# Patient Record
Sex: FEMALE | Race: WHITE | Employment: OTHER | URBAN - METROPOLITAN AREA
[De-identification: names, ages, dates, MRNs, and addresses within clinical notes are randomized per-mention and may not be internally consistent; named-entity substitution may affect disease eponyms.]

---

## 2021-08-16 ENCOUNTER — HOSPITAL ENCOUNTER (INPATIENT)
Age: 61
LOS: 5 days | Discharge: HOME | DRG: 872 | End: 2021-08-21
Attending: EMERGENCY MEDICINE | Admitting: INTERNAL MEDICINE
Payer: MEDICARE

## 2021-08-16 ENCOUNTER — APPOINTMENT (OUTPATIENT)
Dept: GENERAL RADIOLOGY | Age: 61
DRG: 872 | End: 2021-08-16
Attending: EMERGENCY MEDICINE
Payer: MEDICARE

## 2021-08-16 ENCOUNTER — APPOINTMENT (OUTPATIENT)
Dept: CT IMAGING | Age: 61
DRG: 872 | End: 2021-08-16
Attending: EMERGENCY MEDICINE
Payer: MEDICARE

## 2021-08-16 DIAGNOSIS — N10 ACUTE PYELONEPHRITIS: Primary | ICD-10-CM

## 2021-08-16 DIAGNOSIS — G62.9 PERIPHERAL POLYNEUROPATHY: ICD-10-CM

## 2021-08-16 PROBLEM — E11.9 DIABETES TYPE 2, NO OCULAR INVOLVEMENT (HCC): Status: ACTIVE | Noted: 2021-05-04

## 2021-08-16 PROBLEM — M54.41 CHRONIC BILATERAL LOW BACK PAIN WITH RIGHT-SIDED SCIATICA: Status: ACTIVE | Noted: 2019-01-18

## 2021-08-16 PROBLEM — M25.551 RIGHT HIP PAIN: Status: ACTIVE | Noted: 2019-01-18

## 2021-08-16 PROBLEM — E87.1 HYPONATREMIA: Status: ACTIVE | Noted: 2021-08-16

## 2021-08-16 PROBLEM — G89.29 CHRONIC BILATERAL LOW BACK PAIN WITH RIGHT-SIDED SCIATICA: Status: ACTIVE | Noted: 2019-01-18

## 2021-08-16 PROBLEM — A41.9 SEPSIS (HCC): Status: ACTIVE | Noted: 2021-08-16

## 2021-08-16 PROBLEM — E87.6 HYPOKALEMIA: Status: ACTIVE | Noted: 2021-08-16

## 2021-08-16 PROBLEM — N12 PYELONEPHRITIS: Status: ACTIVE | Noted: 2021-08-16

## 2021-08-16 PROBLEM — D72.829 LEUKOCYTOSIS: Status: ACTIVE | Noted: 2021-08-16

## 2021-08-16 LAB
ALBUMIN SERPL-MCNC: 3.2 G/DL (ref 3.4–5)
ALBUMIN/GLOB SERPL: 0.8 {RATIO} (ref 0.8–1.7)
ALP SERPL-CCNC: 92 U/L (ref 45–117)
ALT SERPL-CCNC: 16 U/L (ref 13–56)
AMPHET UR QL SCN: NEGATIVE
ANION GAP SERPL CALC-SCNC: 11 MMOL/L (ref 3–18)
APPEARANCE UR: CLEAR
AST SERPL-CCNC: 14 U/L (ref 10–38)
ATRIAL RATE: 105 BPM
BACTERIA URNS QL MICRO: ABNORMAL /HPF
BARBITURATES UR QL SCN: NEGATIVE
BASOPHILS # BLD: 0 K/UL (ref 0–0.1)
BASOPHILS NFR BLD: 0 % (ref 0–2)
BENZODIAZ UR QL: NEGATIVE
BILIRUB SERPL-MCNC: 0.7 MG/DL (ref 0.2–1)
BILIRUB UR QL: NEGATIVE
BUN SERPL-MCNC: 13 MG/DL (ref 7–18)
BUN/CREAT SERPL: 18 (ref 12–20)
CALCIUM SERPL-MCNC: 8.7 MG/DL (ref 8.5–10.1)
CALCULATED P AXIS, ECG09: 44 DEGREES
CALCULATED R AXIS, ECG10: 27 DEGREES
CALCULATED T AXIS, ECG11: 54 DEGREES
CANNABINOIDS UR QL SCN: POSITIVE
CHLORIDE SERPL-SCNC: 99 MMOL/L (ref 100–111)
CO2 SERPL-SCNC: 24 MMOL/L (ref 21–32)
COCAINE UR QL SCN: NEGATIVE
COLOR UR: YELLOW
COVID-19 RAPID TEST, COVR: NOT DETECTED
CREAT SERPL-MCNC: 0.72 MG/DL (ref 0.6–1.3)
DIAGNOSIS, 93000: NORMAL
DIFFERENTIAL METHOD BLD: ABNORMAL
EOSINOPHIL # BLD: 0 K/UL (ref 0–0.4)
EOSINOPHIL NFR BLD: 0 % (ref 0–5)
EPITH CASTS URNS QL MICRO: ABNORMAL /LPF (ref 0–5)
ERYTHROCYTE [DISTWIDTH] IN BLOOD BY AUTOMATED COUNT: 15 % (ref 11.6–14.5)
EST. AVERAGE GLUCOSE BLD GHB EST-MCNC: 114 MG/DL
FLUAV AG NPH QL IA: NEGATIVE
FLUBV AG NOSE QL IA: NEGATIVE
GLOBULIN SER CALC-MCNC: 3.8 G/DL (ref 2–4)
GLUCOSE BLD STRIP.AUTO-MCNC: 167 MG/DL (ref 70–110)
GLUCOSE BLD STRIP.AUTO-MCNC: 176 MG/DL (ref 70–110)
GLUCOSE SERPL-MCNC: 192 MG/DL (ref 74–99)
GLUCOSE UR STRIP.AUTO-MCNC: NEGATIVE MG/DL
HBA1C MFR BLD: 5.6 % (ref 4.2–5.6)
HCT VFR BLD AUTO: 36.3 % (ref 35–45)
HDSCOM,HDSCOM: ABNORMAL
HGB BLD-MCNC: 12.2 G/DL (ref 12–16)
HGB UR QL STRIP: ABNORMAL
KETONES UR QL STRIP.AUTO: 15 MG/DL
LACTATE BLD-SCNC: 1.73 MMOL/L (ref 0.4–2)
LACTATE BLD-SCNC: 2.36 MMOL/L (ref 0.4–2)
LACTATE BLD-SCNC: 3.05 MMOL/L (ref 0.4–2)
LEUKOCYTE ESTERASE UR QL STRIP.AUTO: ABNORMAL
LYMPHOCYTES # BLD: 1 K/UL (ref 0.9–3.6)
LYMPHOCYTES NFR BLD: 5 % (ref 21–52)
MAGNESIUM SERPL-MCNC: 1.5 MG/DL (ref 1.6–2.6)
MCH RBC QN AUTO: 30.6 PG (ref 24–34)
MCHC RBC AUTO-ENTMCNC: 33.6 G/DL (ref 31–37)
MCV RBC AUTO: 91 FL (ref 74–97)
METHADONE UR QL: NEGATIVE
MONOCYTES # BLD: 1 K/UL (ref 0.05–1.2)
MONOCYTES NFR BLD: 5 % (ref 3–10)
NEUTS SEG # BLD: 17.1 K/UL (ref 1.8–8)
NEUTS SEG NFR BLD: 90 % (ref 40–73)
NITRITE UR QL STRIP.AUTO: POSITIVE
OPIATES UR QL: POSITIVE
P-R INTERVAL, ECG05: 134 MS
PCP UR QL: NEGATIVE
PH UR STRIP: 6.5 [PH] (ref 5–8)
PLATELET # BLD AUTO: 144 K/UL (ref 135–420)
PLATELET COMMENTS,PCOM: ABNORMAL
PMV BLD AUTO: 13.3 FL (ref 9.2–11.8)
POTASSIUM SERPL-SCNC: 3.2 MMOL/L (ref 3.5–5.5)
PROT SERPL-MCNC: 7 G/DL (ref 6.4–8.2)
PROT UR STRIP-MCNC: 300 MG/DL
Q-T INTERVAL, ECG07: 352 MS
QRS DURATION, ECG06: 84 MS
QTC CALCULATION (BEZET), ECG08: 465 MS
RBC # BLD AUTO: 3.99 M/UL (ref 4.2–5.3)
RBC #/AREA URNS HPF: ABNORMAL /HPF (ref 0–5)
RBC MORPH BLD: ABNORMAL
SODIUM SERPL-SCNC: 134 MMOL/L (ref 136–145)
SOURCE, COVRS: NORMAL
SP GR UR REFRACTOMETRY: 1.02 (ref 1–1.03)
UROBILINOGEN UR QL STRIP.AUTO: 0.2 EU/DL (ref 0.2–1)
VENTRICULAR RATE, ECG03: 105 BPM
WBC # BLD AUTO: 19.1 K/UL (ref 4.6–13.2)
WBC URNS QL MICRO: ABNORMAL /HPF (ref 0–5)

## 2021-08-16 PROCEDURE — 65270000029 HC RM PRIVATE

## 2021-08-16 PROCEDURE — 82962 GLUCOSE BLOOD TEST: CPT

## 2021-08-16 PROCEDURE — 74011000636 HC RX REV CODE- 636: Performed by: EMERGENCY MEDICINE

## 2021-08-16 PROCEDURE — 81001 URINALYSIS AUTO W/SCOPE: CPT

## 2021-08-16 PROCEDURE — 87635 SARS-COV-2 COVID-19 AMP PRB: CPT

## 2021-08-16 PROCEDURE — 71045 X-RAY EXAM CHEST 1 VIEW: CPT

## 2021-08-16 PROCEDURE — 74011636637 HC RX REV CODE- 636/637: Performed by: INTERNAL MEDICINE

## 2021-08-16 PROCEDURE — 83735 ASSAY OF MAGNESIUM: CPT

## 2021-08-16 PROCEDURE — 96375 TX/PRO/DX INJ NEW DRUG ADDON: CPT

## 2021-08-16 PROCEDURE — 87086 URINE CULTURE/COLONY COUNT: CPT

## 2021-08-16 PROCEDURE — 74177 CT ABD & PELVIS W/CONTRAST: CPT

## 2021-08-16 PROCEDURE — 87449 NOS EACH ORGANISM AG IA: CPT

## 2021-08-16 PROCEDURE — 74011250637 HC RX REV CODE- 250/637: Performed by: EMERGENCY MEDICINE

## 2021-08-16 PROCEDURE — 87804 INFLUENZA ASSAY W/OPTIC: CPT

## 2021-08-16 PROCEDURE — 74011250636 HC RX REV CODE- 250/636: Performed by: EMERGENCY MEDICINE

## 2021-08-16 PROCEDURE — 96365 THER/PROPH/DIAG IV INF INIT: CPT

## 2021-08-16 PROCEDURE — 83605 ASSAY OF LACTIC ACID: CPT

## 2021-08-16 PROCEDURE — 96366 THER/PROPH/DIAG IV INF ADDON: CPT

## 2021-08-16 PROCEDURE — 87040 BLOOD CULTURE FOR BACTERIA: CPT

## 2021-08-16 PROCEDURE — 74011250637 HC RX REV CODE- 250/637: Performed by: INTERNAL MEDICINE

## 2021-08-16 PROCEDURE — 83036 HEMOGLOBIN GLYCOSYLATED A1C: CPT

## 2021-08-16 PROCEDURE — 80053 COMPREHEN METABOLIC PANEL: CPT

## 2021-08-16 PROCEDURE — 93005 ELECTROCARDIOGRAM TRACING: CPT

## 2021-08-16 PROCEDURE — 99285 EMERGENCY DEPT VISIT HI MDM: CPT

## 2021-08-16 PROCEDURE — 80307 DRUG TEST PRSMV CHEM ANLYZR: CPT

## 2021-08-16 PROCEDURE — 74011250636 HC RX REV CODE- 250/636: Performed by: INTERNAL MEDICINE

## 2021-08-16 PROCEDURE — 74011000250 HC RX REV CODE- 250: Performed by: EMERGENCY MEDICINE

## 2021-08-16 PROCEDURE — 85025 COMPLETE CBC W/AUTO DIFF WBC: CPT

## 2021-08-16 RX ORDER — ALBUTEROL SULFATE 90 UG/1
2 AEROSOL, METERED RESPIRATORY (INHALATION)
Status: DISCONTINUED | OUTPATIENT
Start: 2021-08-16 | End: 2021-08-21 | Stop reason: HOSPADM

## 2021-08-16 RX ORDER — INSULIN LISPRO 100 [IU]/ML
INJECTION, SOLUTION INTRAVENOUS; SUBCUTANEOUS
Status: DISCONTINUED | OUTPATIENT
Start: 2021-08-16 | End: 2021-08-21 | Stop reason: HOSPADM

## 2021-08-16 RX ORDER — LEVOFLOXACIN 5 MG/ML
750 INJECTION, SOLUTION INTRAVENOUS EVERY 24 HOURS
Status: DISCONTINUED | OUTPATIENT
Start: 2021-08-16 | End: 2021-08-21 | Stop reason: ALTCHOICE

## 2021-08-16 RX ORDER — ACETAMINOPHEN 325 MG/1
650 TABLET ORAL
Status: DISCONTINUED | OUTPATIENT
Start: 2021-08-16 | End: 2021-08-21 | Stop reason: HOSPADM

## 2021-08-16 RX ORDER — SODIUM CHLORIDE 9 MG/ML
150 INJECTION, SOLUTION INTRAVENOUS CONTINUOUS
Status: DISPENSED | OUTPATIENT
Start: 2021-08-16 | End: 2021-08-17

## 2021-08-16 RX ORDER — ONDANSETRON 4 MG/1
4 TABLET, ORALLY DISINTEGRATING ORAL
Status: DISCONTINUED | OUTPATIENT
Start: 2021-08-16 | End: 2021-08-21 | Stop reason: HOSPADM

## 2021-08-16 RX ORDER — POTASSIUM CHLORIDE 20 MEQ/1
40 TABLET, EXTENDED RELEASE ORAL EVERY 4 HOURS
Status: COMPLETED | OUTPATIENT
Start: 2021-08-16 | End: 2021-08-16

## 2021-08-16 RX ORDER — ONDANSETRON 2 MG/ML
4 INJECTION INTRAMUSCULAR; INTRAVENOUS
Status: DISCONTINUED | OUTPATIENT
Start: 2021-08-16 | End: 2021-08-21 | Stop reason: HOSPADM

## 2021-08-16 RX ORDER — ONDANSETRON 2 MG/ML
4 INJECTION INTRAMUSCULAR; INTRAVENOUS
Status: COMPLETED | OUTPATIENT
Start: 2021-08-16 | End: 2021-08-16

## 2021-08-16 RX ORDER — MAGNESIUM SULFATE 100 %
4 CRYSTALS MISCELLANEOUS AS NEEDED
Status: DISCONTINUED | OUTPATIENT
Start: 2021-08-16 | End: 2021-08-21 | Stop reason: HOSPADM

## 2021-08-16 RX ORDER — OXYCODONE HYDROCHLORIDE 5 MG/1
5 TABLET ORAL
Status: DISCONTINUED | OUTPATIENT
Start: 2021-08-16 | End: 2021-08-21 | Stop reason: HOSPADM

## 2021-08-16 RX ORDER — SODIUM CHLORIDE 0.9 % (FLUSH) 0.9 %
5-40 SYRINGE (ML) INJECTION EVERY 8 HOURS
Status: DISCONTINUED | OUTPATIENT
Start: 2021-08-16 | End: 2021-08-19

## 2021-08-16 RX ORDER — PANTOPRAZOLE SODIUM 40 MG/10ML
40 INJECTION, POWDER, LYOPHILIZED, FOR SOLUTION INTRAVENOUS DAILY
Status: DISCONTINUED | OUTPATIENT
Start: 2021-08-17 | End: 2021-08-19

## 2021-08-16 RX ORDER — POLYETHYLENE GLYCOL 3350 17 G/17G
17 POWDER, FOR SOLUTION ORAL DAILY PRN
Status: DISCONTINUED | OUTPATIENT
Start: 2021-08-16 | End: 2021-08-21 | Stop reason: HOSPADM

## 2021-08-16 RX ORDER — ACETAMINOPHEN 500 MG
1000 TABLET ORAL
Status: COMPLETED | OUTPATIENT
Start: 2021-08-16 | End: 2021-08-16

## 2021-08-16 RX ORDER — SODIUM CHLORIDE, SODIUM LACTATE, POTASSIUM CHLORIDE, CALCIUM CHLORIDE 600; 310; 30; 20 MG/100ML; MG/100ML; MG/100ML; MG/100ML
250 INJECTION, SOLUTION INTRAVENOUS CONTINUOUS
Status: DISCONTINUED | OUTPATIENT
Start: 2021-08-16 | End: 2021-08-16

## 2021-08-16 RX ORDER — MAGNESIUM SULFATE HEPTAHYDRATE 40 MG/ML
2 INJECTION, SOLUTION INTRAVENOUS ONCE
Status: DISCONTINUED | OUTPATIENT
Start: 2021-08-16 | End: 2021-08-16

## 2021-08-16 RX ORDER — DEXTROSE 50 % IN WATER (D50W) INTRAVENOUS SYRINGE
25-50 AS NEEDED
Status: DISCONTINUED | OUTPATIENT
Start: 2021-08-16 | End: 2021-08-21 | Stop reason: HOSPADM

## 2021-08-16 RX ORDER — SODIUM CHLORIDE 0.9 % (FLUSH) 0.9 %
5-40 SYRINGE (ML) INJECTION AS NEEDED
Status: DISCONTINUED | OUTPATIENT
Start: 2021-08-16 | End: 2021-08-19

## 2021-08-16 RX ORDER — MAGNESIUM SULFATE HEPTAHYDRATE 40 MG/ML
2 INJECTION, SOLUTION INTRAVENOUS ONCE
Status: COMPLETED | OUTPATIENT
Start: 2021-08-16 | End: 2021-08-16

## 2021-08-16 RX ORDER — ENOXAPARIN SODIUM 100 MG/ML
40 INJECTION SUBCUTANEOUS DAILY
Status: DISCONTINUED | OUTPATIENT
Start: 2021-08-17 | End: 2021-08-18

## 2021-08-16 RX ORDER — ACETAMINOPHEN 650 MG/1
650 SUPPOSITORY RECTAL
Status: DISCONTINUED | OUTPATIENT
Start: 2021-08-16 | End: 2021-08-21 | Stop reason: HOSPADM

## 2021-08-16 RX ORDER — SODIUM CHLORIDE 0.9 % (FLUSH) 0.9 %
5-10 SYRINGE (ML) INJECTION AS NEEDED
Status: DISCONTINUED | OUTPATIENT
Start: 2021-08-16 | End: 2021-08-21 | Stop reason: HOSPADM

## 2021-08-16 RX ADMIN — SODIUM CHLORIDE 150 ML/HR: 900 INJECTION, SOLUTION INTRAVENOUS at 16:09

## 2021-08-16 RX ADMIN — ONDANSETRON 4 MG: 4 TABLET, ORALLY DISINTEGRATING ORAL at 22:12

## 2021-08-16 RX ADMIN — Medication 10 ML: at 15:12

## 2021-08-16 RX ADMIN — SODIUM CHLORIDE 448 ML: 900 INJECTION, SOLUTION INTRAVENOUS at 12:32

## 2021-08-16 RX ADMIN — OXYCODONE 5 MG: 5 TABLET ORAL at 18:08

## 2021-08-16 RX ADMIN — IOPAMIDOL 100 ML: 612 INJECTION, SOLUTION INTRAVENOUS at 12:29

## 2021-08-16 RX ADMIN — LEVOFLOXACIN 750 MG: 5 INJECTION, SOLUTION INTRAVENOUS at 11:09

## 2021-08-16 RX ADMIN — SODIUM CHLORIDE 1000 ML: 900 INJECTION, SOLUTION INTRAVENOUS at 10:53

## 2021-08-16 RX ADMIN — CEFEPIME HYDROCHLORIDE 2 G: 2 INJECTION, POWDER, FOR SOLUTION INTRAVENOUS at 11:09

## 2021-08-16 RX ADMIN — MAGNESIUM SULFATE 2 G: 2 INJECTION INTRAVENOUS at 18:00

## 2021-08-16 RX ADMIN — POTASSIUM CHLORIDE 40 MEQ: 20 TABLET, EXTENDED RELEASE ORAL at 15:13

## 2021-08-16 RX ADMIN — OXYCODONE 5 MG: 5 TABLET ORAL at 22:20

## 2021-08-16 RX ADMIN — ONDANSETRON 4 MG: 2 INJECTION INTRAMUSCULAR; INTRAVENOUS at 15:12

## 2021-08-16 RX ADMIN — ACETAMINOPHEN 1000 MG: 500 TABLET ORAL at 11:09

## 2021-08-16 RX ADMIN — SODIUM CHLORIDE, SODIUM LACTATE, POTASSIUM CHLORIDE, AND CALCIUM CHLORIDE 250 ML/HR: 600; 310; 30; 20 INJECTION, SOLUTION INTRAVENOUS at 13:22

## 2021-08-16 RX ADMIN — INSULIN LISPRO 2 UNITS: 100 INJECTION, SOLUTION INTRAVENOUS; SUBCUTANEOUS at 18:00

## 2021-08-16 RX ADMIN — CEFEPIME HYDROCHLORIDE 2 G: 2 INJECTION, POWDER, FOR SOLUTION INTRAVENOUS at 22:12

## 2021-08-16 RX ADMIN — SODIUM CHLORIDE 1000 ML: 900 INJECTION, SOLUTION INTRAVENOUS at 11:12

## 2021-08-16 RX ADMIN — ONDANSETRON 4 MG: 2 INJECTION INTRAMUSCULAR; INTRAVENOUS at 11:09

## 2021-08-16 RX ADMIN — POTASSIUM CHLORIDE 40 MEQ: 20 TABLET, EXTENDED RELEASE ORAL at 18:08

## 2021-08-16 NOTE — ROUTINE PROCESS
Bedside shift change report given to Jared Will [place] (oncoming nurse) by Antonina Yuen RN (offgoing nurse). Report included the following information SBAR, Kardex, Intake/Output, MAR and Recent Results.

## 2021-08-16 NOTE — ED PROVIDER NOTES
EMERGENCY DEPARTMENT HISTORY AND PHYSICAL EXAM    10:49 AM    Date: 8/16/2021  Patient Name: Hattie Arriaga    History of Presenting Illness     Chief Complaint   Patient presents with    Abdominal Pain     reports last week of fevers, vomiting. felt like a UTI to her    Vomiting    Fever       History Provided By: Patient  Location/Duration/Severity/Modifying factors   Patient is a 57-year-old female with a past medical history pretension presenting to the emergency department with a chief complaint of loss of appetite, vomiting, malaise going on for the past 2 to 3 days. Reports that it feels similar to when she had urinary tract infections in the past.  She complains of generalized pain, myalgias and bilateral flank pain. Also with some abdominal discomfort reports that she feels \"bloated\". She denies any chest pain or shortness of breath. Moderate in severity. Far as her symptoms ago, the pain in her back is in both flanks, no numbness tingling weakness in her legs no urinary retention. Not noted any dysuria urgency or frequency. Denies any history of kidney stones.           PCP: Lasha Mora DO    Current Facility-Administered Medications   Medication Dose Route Frequency Provider Last Rate Last Admin    sodium chloride (NS) flush 5-10 mL  5-10 mL IntraVENous PRN Shane Harris DO        cefepime (MAXIPIME) 2 g in sterile water (preservative free) 10 mL IV syringe  2 g IntraVENous Q8H Adriana CORONA, DO   IV Completed at 08/16/21 1116    levoFLOXacin (LEVAQUIN) 750 mg in D5W IVPB  750 mg IntraVENous Q24H Shane Harris DO   IV Completed at 08/16/21 1257    albuterol (PROVENTIL HFA, VENTOLIN HFA, PROAIR HFA) inhaler 2 Puff  2 Puff Inhalation Q4H PRN Jose Martin Bowie MD        sodium chloride (NS) flush 5-40 mL  5-40 mL IntraVENous Q8H Jose Martin Bowie MD   10 mL at 08/16/21 1512    sodium chloride (NS) flush 5-40 mL  5-40 mL IntraVENous PRN Jose Martin Bowie MD       [facility] acetaminophen (TYLENOL) tablet 650 mg  650 mg Oral Q6H PRN Genny Bowie MD        Or    acetaminophen (TYLENOL) suppository 650 mg  650 mg Rectal Q6H PRN Genny Bowie MD        polyethylene glycol (MIRALAX) packet 17 g  17 g Oral DAILY PRN Genny Bowie MD        ondansetron (ZOFRAN ODT) tablet 4 mg  4 mg Oral Q8H PRN Genny Bowie MD        Or    [facility]ron [facility]) injection 4 mg  4 mg IntraVENous Q6H PRN Genny Bowie MD   4 mg at 08/16/21 1512    [START ON 8/17/2021] enoxaparin (LOVENOX) injection 40 mg  40 mg SubCUTAneous DAILY Genny Bowie MD        [START ON 8/17/2021] pantoprazole (PROTONIX) injection 40 mg  40 mg IntraVENous DAILY Genny Bowie MD        0.9% sodium chloride infusion  150 mL/hr IntraVENous CONTINUOUS Genny Bowie  mL/hr at 08/16/21 1609 150 mL/hr at 08/16/21 1609    insulin lispro (HUMALOG) injection   SubCUTAneous AC&HS Genny Bowie MD   2 Units at 08/16/21 1800    glucose chewable tablet 16 g  4 Tablet Oral PRN Genny Bowie MD        glucagon (GLUCAGEN) injection 1 mg  1 mg IntraMUSCular PRN Genny Bowie MD        dextrose (D50W) injection syrg 12.5-25 g  25-50 mL IntraVENous PRN Genny Bowie MD        oxyCODONE IR (ROXICODONE) tablet 5 mg  5 mg Oral Q4H PRN Genny Bowie MD   5 mg at 08/16/21 1808       Past History     Past Medical History:  Past Medical History:   Diagnosis Date    Back pain     Fibromyalgia     Neuropathy        Past Surgical History:  No past surgical history on file. Family History:  No family history on file. Social History:  Social History     Tobacco Use    Smoking status: Not on file   Substance Use Topics    Alcohol use: Not on file    Drug use: Not on file       Allergies:   Allergies   Allergen Reactions    Sulfa (Sulfonamide Antibiotics) Hives       I reviewed and confirmed the above information with patient and updated as necessary. Review of Systems     Review of Systems   Constitutional: Positive for appetite change, chills, diaphoresis, fatigue and fever. HENT: Negative for congestion, rhinorrhea, sinus pressure and sneezing. Eyes: Negative for visual disturbance. Respiratory: Negative for cough, shortness of breath and wheezing. Cardiovascular: Negative for chest pain and leg swelling. Gastrointestinal: Negative for abdominal pain, diarrhea, nausea and vomiting. Genitourinary: Positive for flank pain. Negative for dysuria, frequency and urgency. Musculoskeletal: Negative for back pain and neck pain. Skin: Negative for rash. Neurological: Negative for syncope, numbness and headaches. Physical Exam     Visit Vitals  /61   Pulse 95   Temp 97.9 °F (36.6 °C)   Resp 16   Ht 5' 4\" (1.626 m)   Wt 81.6 kg (180 lb)   SpO2 95%   Breastfeeding No   BMI 30.90 kg/m²       Physical Exam  Vitals and nursing note reviewed. Constitutional:       General: She is not in acute distress. Appearance: Normal appearance. She is normal weight. She is not ill-appearing or toxic-appearing. HENT:      Head: Normocephalic and atraumatic. Right Ear: External ear normal.      Left Ear: External ear normal.      Nose: Nose normal.      Mouth/Throat:      Mouth: Mucous membranes are moist.   Eyes:      Extraocular Movements: Extraocular movements intact. Conjunctiva/sclera: Conjunctivae normal.      Pupils: Pupils are equal, round, and reactive to light. Cardiovascular:      Rate and Rhythm: Normal rate and regular rhythm. Pulses: Normal pulses. Heart sounds: Normal heart sounds. No murmur heard. Pulmonary:      Effort: Pulmonary effort is normal.      Breath sounds: Normal breath sounds. No wheezing, rhonchi or rales. Abdominal:      General: Abdomen is flat. Bowel sounds are normal.      Palpations: Abdomen is soft. Tenderness:  There is generalized abdominal tenderness. There is no guarding or rebound. Negative signs include Bautista's sign and McBurney's sign. Comments: Generalized abdominal fullness. Musculoskeletal:         General: No swelling or tenderness. Normal range of motion. Cervical back: Normal range of motion and neck supple. Right lower leg: No edema. Left lower leg: No edema. Skin:     General: Skin is warm and dry. Capillary Refill: Capillary refill takes less than 2 seconds. Findings: No rash. Neurological:      General: No focal deficit present. Mental Status: She is alert. She is disoriented. Diagnostic Study Results     Labs -  Recent Results (from the past 24 hour(s))   EKG, 12 LEAD, INITIAL    Collection Time: 08/16/21 10:40 AM   Result Value Ref Range    Ventricular Rate 105 BPM    Atrial Rate 105 BPM    P-R Interval 134 ms    QRS Duration 84 ms    Q-T Interval 352 ms    QTC Calculation (Bezet) 465 ms    Calculated P Axis 44 degrees    Calculated R Axis 27 degrees    Calculated T Axis 54 degrees    Diagnosis       Sinus tachycardia  Nonspecific ST abnormality  Abnormal ECG  No previous ECGs available  Confirmed by Ursula Buckner MD. ([id number]) on 8/16/2021 6:98:87 PM     METABOLIC PANEL, COMPREHENSIVE    Collection Time: 08/16/21 10:45 AM   Result Value Ref Range    Sodium 134 (L) 136 - 145 mmol/L    Potassium 3.2 (L) 3.5 - 5.5 mmol/L    Chloride 99 (L) 100 - 111 mmol/L    CO2 24 21 - 32 mmol/L    Anion gap 11 3.0 - 18 mmol/L    Glucose 192 (H) 74 - 99 mg/dL    BUN 13 7.0 - 18 MG/DL    Creatinine 0.72 0.6 - 1.3 MG/DL    BUN/Creatinine ratio 18 12 - 20      GFR est AA >60 >60 ml/min/1.73m2    GFR est non-AA >60 >60 ml/min/1.73m2    Calcium 8.7 8.5 - 10.1 MG/DL    Bilirubin, total 0.7 0.2 - 1.0 MG/DL    ALT (SGPT) 16 13 - 56 U/L    AST (SGOT) 14 10 - 38 U/L    Alk.  phosphatase 92 45 - 117 U/L    Protein, total 7.0 6.4 - 8.2 g/dL    Albumin 3.2 (L) 3.4 - 5.0 g/dL    Globulin 3.8 2.0 - 4.0 g/dL    A-G Ratio 0.8 0.8 - 1.7     CBC WITH AUTOMATED DIFF    Collection Time: 08/16/21 10:45 AM   Result Value Ref Range    WBC 19.1 (H) 4.6 - 13.2 K/uL    RBC 3.99 (L) 4.20 - 5.30 M/uL    HGB 12.2 12.0 - 16.0 g/dL    HCT 36.3 35.0 - 45.0 %    MCV 91.0 74.0 - 97.0 FL    MCH 30.6 24.0 - 34.0 PG    MCHC 33.6 31.0 - 37.0 g/dL    RDW 15.0 (H) 11.6 - 14.5 %    PLATELET 849 861 - 077 K/uL    MPV 13.3 (H) 9.2 - 11.8 FL    NEUTROPHILS 90 (H) 40 - 73 %    LYMPHOCYTES 5 (L) 21 - 52 %    MONOCYTES 5 3 - 10 %    EOSINOPHILS 0 0 - 5 %    BASOPHILS 0 0 - 2 %    ABS. NEUTROPHILS 17.1 (H) 1.8 - 8.0 K/UL    ABS. LYMPHOCYTES 1.0 0.9 - 3.6 K/UL    ABS. MONOCYTES 1.0 0.05 - 1.2 K/UL    ABS. EOSINOPHILS 0.0 0.0 - 0.4 K/UL    ABS.  BASOPHILS 0.0 0.0 - 0.1 K/UL    DF AUTOMATED      PLATELET COMMENTS ADEQUATE PLATELETS      RBC COMMENTS NORMOCYTIC, NORMOCHROMIC     MAGNESIUM    Collection Time: 08/16/21 10:45 AM   Result Value Ref Range    Magnesium 1.5 (L) 1.6 - 2.6 mg/dL   HEMOGLOBIN A1C WITH EAG    Collection Time: 08/16/21 10:45 AM   Result Value Ref Range    Hemoglobin A1c 5.6 4.2 - 5.6 %    Est. average glucose 114 mg/dL   POC LACTIC ACID    Collection Time: 08/16/21 10:52 AM   Result Value Ref Range    Lactic Acid (POC) 2.36 (HH) 0.40 - 2.00 mmol/L   INFLUENZA A & B AG (RAPID TEST)    Collection Time: 08/16/21 10:54 AM   Result Value Ref Range    Influenza A Antigen Negative NEG      Influenza B Antigen Negative NEG     COVID-19 RAPID TEST    Collection Time: 08/16/21 11:00 AM   Result Value Ref Range    Specimen source Nasopharyngeal      COVID-19 rapid test Not detected NOTD     POC LACTIC ACID    Collection Time: 08/16/21 11:30 AM   Result Value Ref Range    Lactic Acid (POC) 3.05 (HH) 0.40 - 2.00 mmol/L   URINALYSIS W/ RFLX MICROSCOPIC    Collection Time: 08/16/21 12:35 PM   Result Value Ref Range    Color YELLOW      Appearance CLEAR      Specific gravity 1.016 1.005 - 1.030      pH (UA) 6.5 5.0 - 8.0      Protein 300 (A) NEG mg/dL    Glucose Negative NEG mg/dL    Ketone 15 (A) NEG mg/dL    Bilirubin Negative NEG      Blood LARGE (A) NEG      Urobilinogen 0.2 0.2 - 1.0 EU/dL    Nitrites Positive (A) NEG      Leukocyte Esterase MODERATE (A) NEG     URINE MICROSCOPIC ONLY    Collection Time: 08/16/21 12:35 PM   Result Value Ref Range    WBC TOO NUMEROUS TO COUNT 0 - 5 /hpf    RBC 36 to 50 0 - 5 /hpf    Epithelial cells 2+ 0 - 5 /lpf    Bacteria 3+ (A) NEG /hpf   POC LACTIC ACID    Collection Time: 08/16/21 12:41 PM   Result Value Ref Range    Lactic Acid (POC) 1.73 0.40 - 2.00 mmol/L   DRUG SCREEN, URINE    Collection Time: 08/16/21  4:15 PM   Result Value Ref Range    BENZODIAZEPINES Negative NEG      BARBITURATES Negative NEG      THC (TH-CANNABINOL) Positive (A) NEG      OPIATES Positive (A) NEG      PCP(PHENCYCLIDINE) Negative NEG      COCAINE Negative NEG      AMPHETAMINES Negative NEG      METHADONE Negative NEG      HDSCOM (NOTE)    GLUCOSE, POC    Collection Time: 08/16/21  5:04 PM   Result Value Ref Range    Glucose (POC) 167 (H) 70 - 110 mg/dL   GLUCOSE, POC    Collection Time: 08/16/21  9:24 PM   Result Value Ref Range    Glucose (POC) 176 (H) 70 - 110 mg/dL         Radiologic Studies -   CT CHEST ABD PELV W CONT   Final Result      1. Within the chest:      > Mild bibasilar atelectasis without evidence of pneumonia or pleural   effusion. 2. Within the abdomen/pelvis:      > CT findings in keeping with pyelitis and pyelonephritis involving the left   kidney without evidence of hydronephrosis or complicating left renal stone. > Upper pole right kidney calyceal diverticulum containing several small   stones.      > No abnormal bowel wall thickening or inflammatory change. Normal appendix. XR CHEST PORT   Final Result      Faint asymmetric left lung base opacity, potentially developing infiltrate. No   accompanying pleural effusion. Medical Decision Making   I am the first provider for this patient.     I reviewed the vital signs, available nursing notes, past medical history, past surgical history, family history and social history. Vital Signs-Reviewed the patient's vital signs. EKG: Sinus tachycardia rate of 105. No ST elevation or depression. Nonspecific ST changes, however no acute ischemic changes. Records Reviewed: Nursing Notes, Old Medical Records, Previous Radiology Studies and Previous Laboratory Studies (Time of Review: 10:49 AM)    ED Course: Progress Notes, Reevaluation, and Consults:         Provider Notes (Medical Decision Making):   MDM  Number of Diagnoses or Management Options  Acute pyelonephritis  Diagnosis management comments: 69-year-old female presents to the ED with complaints of UTI symptoms, per her recollection. Primarily vomiting and loss of appetite and flank pain. Differential diagnosis includes pyelonephritis, sepsis, UTI, pneumonia, Covid, influenza, obstructing ureteral stone, etc.    Will obtain CT abdomen pelvis as well as a chest CT to look for a source of sepsis, we will treat her with Zofran IV fluids and start antibiotics after drawing blood cultures. Of suspicious may be urinary given her symptoms however we will try to rule out alternative etiologies with the CTs. Results reviewed: Significant for UTI as well as moderate leukocytosis, fever also in triage. CT does not show any sign of stone, reflect pyelonephritis/pyelitis. Will admit to the hospitalist given she is demonstrating early signs of sepsis. Urine culture sent. Started on Levaquin and cefepime. Consulted to the hospitalist, Dr. Cedric Vazquez who agrees to admit the patient. Todd Waterman, DO\      Procedures    Critical Care Time: CRITICAL CARE NOTE:    I have spent 34 minutes of critical care time involved in lab review, consultations with specialist, family decision-making, and documentation. During this entire length of time I was immediately available to the patient. Critical Care:   The reason for providing this level of medical care for this critically ill patient was due a critical illness that impaired one or more vital organ systems such that there was a high probability of imminent or life threatening deterioration in the patients condition. This care involved high complexity decision making to assess, manipulate, and support vital system functions, to treat this vital organ system failure and to prevent further life threatening deterioration of the patients condition. Time is exclusive of procedural and teaching time. Juarez Murdock DO      Diagnosis     Clinical Impression:   1. Acute pyelonephritis        Disposition: Admit    Follow-up Information     Follow up With Specialties Details Why Contact Info    Elizabeth Kim DO Internal Medicine   [address] [place] [address]  [phone]             There are no discharge medications for this patient. Juarez Murdock DO   Emergency Medicine   August 16, 2021, 10:49 AM     This note is dictated utilizing Dragon voice recognition software. Unfortunately this leads to occasional typographical errors using the voice recognition. I apologize in advance if the situation occurs. If questions occur please do not hesitate to contact me directly.     Juarez Murdock DO

## 2021-08-16 NOTE — Clinical Note
Status[de-identified] INPATIENT [101]   Type of Bed: Medical [8]   Inpatient Hospitalization Certified Necessary for the Following Reasons: 3.  Patient receiving treatment that can only be provided in an inpatient setting (further clarification in H&P documentation)   Admitting Diagnosis: Pyelonephritis [673720]   Admitting Physician: Molly Bowers [[id number]]   Attending Physician: Molly Bowers [[id number]]   Estimated Length of Stay: 2 Midnights   Discharge Plan[de-identified] [facility]

## 2021-08-16 NOTE — H&P
History & Physical    Patient: Obed Gross MRN: [id number]  CSN: [id number]    YOB: 1960  Age: 64 y.o. Sex: female      DOA: 8/16/2021    Chief Complaint:   Chief Complaint   Patient presents with    Abdominal Pain     reports last week of fevers, vomiting. felt like a UTI to her    Vomiting    Fever        Patient is a 79-year-old female with history of fibromyalgia pain who presents to the emergency room after 3-day history of high fever that started this morning worsening back pain over the last 2 days chills associated with nausea vomiting vague left quadrant pain. She came to the emergency room as she is on symptoms of high fever for in the emergency room rapid Covid was negative blood cultures and urine cultures were obtained CT scan did confirm pyonephritis no other abnormalities of the lung except for early pneumonia patient states she is also been coughing some and congested has used Robitussin with no improvement. Patient is fully vaccinated against ZVBLE-13 her last dose was in March she took [employer] vaccine she denies any COVID-19 exposures  HPI:     Obed Gross is a 64 y.o.  female who    Past Medical History:   Diagnosis Date    Back pain     Fibromyalgia     Neuropathy        No past surgical history on file. No family history on file. Social History     Socioeconomic History    Marital status:      Spouse name: Not on file    Number of children: Not on file    Years of education: Not on file    Highest education level: Not on file     Social Determinants of Health     Financial Resource Strain:     Difficulty of Paying Living Expenses:    Food Insecurity:     Worried About Running Out of Food in the Last Year:     [address] in the Last Year:    Transportation Needs:     Lack of Transportation (Medical):      Lack of Transportation (Non-Medical):    Physical Activity:     Days of Exercise per Week:     Minutes of Exercise per Session: Stress:     Feeling of Stress :    Social Connections:     Frequency of Communication with Friends and Family:     Frequency of Social Gatherings with Friends and Family:     Attends [employer] Services:     Active Member of Clubs or Organizations:     Attends Club or Organization Meetings:     Marital Status:        Prior to Admission medications    Not on File       Allergies   Allergen Reactions    Sulfa (Sulfonamide Antibiotics) Hives         Review of Systems  GENERAL: Patient alert, awake and oriented times 3, able to communicate full sentences and not in distress. HEENT: No change in vision, no earache, tinnitus, sore throat or sinus congestion. NECK: No pain or stiffness. PULMONARY: No shortness of breath,= cough or wheeze. Cardiovascular: no pnd or orthopnea, no CP  GASTROINTESTINAL: No abdominal pain, nausea, vomiting or diarrhea, melena or bright red blood per rectum. GENITOURINARY:+ urinary frequency, urgency, hesitancy or dysuria. MUSCULOSKELETAL: No joint or muscle pain, no back pain, no recent trauma. DERMATOLOGIC: No rash, no itching, no lesions. ENDOCRINE: No polyuria, polydipsia, no heat or cold intolerance. No recent change in weight. HEMATOLOGICAL: No anemia or easy bruising or bleeding. NEUROLOGIC: No headache, seizures, numbness, tingling or weakness. Physical Exam:     Physical Exam:  Visit Vitals  BP (!) 125/46   Pulse 88   Temp 97.9 °F (36.6 °C)   Resp 16   Ht 5' 4\" (1.626 m)   Wt 81.6 kg (180 lb)   SpO2 98%   Breastfeeding No   BMI 30.90 kg/m²      O2 Device: None (Room air)    Temp (24hrs), Av.2 °F (37.9 °C), Min:97.9 °F (36.6 °C), Max:103.5 °F (39.7 °C)     0701 -  1900  In: 1209 [I.V.:2608]  Out: 200 [Urine:200]   No intake/output data recorded. General:  Alert, cooperative, no distress, appears stated age. Head: Normocephalic, without obvious abnormality, atraumatic. Eyes:  Conjunctivae/corneas clear.  PERRL, EOMs intact. Nose: Nares normal. No drainage or sinus tenderness. Neck: Supple, symmetrical, trachea midline, no adenopathy, thyroid: no enlargement, no carotid bruit and no JVD. Lungs:   Clear to auscultation bilaterally. Heart:  Regular rate and rhythm, S1, S2 normal. tachycardiac     Abdomen: Soft, non-tender. Bowel sounds normal.    Extremities: Extremities normal, atraumatic, no cyanosis or edema. Pulses: 2+ and symmetric all extremities. Skin:  No rashes or lesions   Neurologic: AAOx3, No focal motor or sensory deficit. Recent Results (from the past 24 hour(s))   EKG, 12 LEAD, INITIAL    Collection Time: 08/16/21 10:40 AM   Result Value Ref Range    Ventricular Rate 105 BPM    Atrial Rate 105 BPM    P-R Interval 134 ms    QRS Duration 84 ms    Q-T Interval 352 ms    QTC Calculation (Bezet) 465 ms    Calculated P Axis 44 degrees    Calculated R Axis 27 degrees    Calculated T Axis 54 degrees    Diagnosis       Sinus tachycardia  Nonspecific ST abnormality  Abnormal ECG  No previous ECGs available     METABOLIC PANEL, COMPREHENSIVE    Collection Time: 08/16/21 10:45 AM   Result Value Ref Range    Sodium 134 (L) 136 - 145 mmol/L    Potassium 3.2 (L) 3.5 - 5.5 mmol/L    Chloride 99 (L) 100 - 111 mmol/L    CO2 24 21 - 32 mmol/L    Anion gap 11 3.0 - 18 mmol/L    Glucose 192 (H) 74 - 99 mg/dL    BUN 13 7.0 - 18 MG/DL    Creatinine 0.72 0.6 - 1.3 MG/DL    BUN/Creatinine ratio 18 12 - 20      GFR est AA >60 >60 ml/min/1.73m2    GFR est non-AA >60 >60 ml/min/1.73m2    Calcium 8.7 8.5 - 10.1 MG/DL    Bilirubin, total 0.7 0.2 - 1.0 MG/DL    ALT (SGPT) 16 13 - 56 U/L    AST (SGOT) 14 10 - 38 U/L    Alk.  phosphatase 92 45 - 117 U/L    Protein, total 7.0 6.4 - 8.2 g/dL    Albumin 3.2 (L) 3.4 - 5.0 g/dL    Globulin 3.8 2.0 - 4.0 g/dL    A-G Ratio 0.8 0.8 - 1.7     CBC WITH AUTOMATED DIFF    Collection Time: 08/16/21 10:45 AM   Result Value Ref Range    WBC 19.1 (H) 4.6 - 13.2 K/uL    RBC 3.99 (L) 4.20 - 5.30 M/uL HGB 12.2 12.0 - 16.0 g/dL    HCT 36.3 35.0 - 45.0 %    MCV 91.0 74.0 - 97.0 FL    MCH 30.6 24.0 - 34.0 PG    MCHC 33.6 31.0 - 37.0 g/dL    RDW 15.0 (H) 11.6 - 14.5 %    PLATELET 847 650 - 886 K/uL    MPV 13.3 (H) 9.2 - 11.8 FL    NEUTROPHILS 90 (H) 40 - 73 %    LYMPHOCYTES 5 (L) 21 - 52 %    MONOCYTES 5 3 - 10 %    EOSINOPHILS 0 0 - 5 %    BASOPHILS 0 0 - 2 %    ABS. NEUTROPHILS 17.1 (H) 1.8 - 8.0 K/UL    ABS. LYMPHOCYTES 1.0 0.9 - 3.6 K/UL    ABS. MONOCYTES 1.0 0.05 - 1.2 K/UL    ABS. EOSINOPHILS 0.0 0.0 - 0.4 K/UL    ABS.  BASOPHILS 0.0 0.0 - 0.1 K/UL    DF AUTOMATED      PLATELET COMMENTS ADEQUATE PLATELETS      RBC COMMENTS NORMOCYTIC, NORMOCHROMIC     MAGNESIUM    Collection Time: 08/16/21 10:45 AM   Result Value Ref Range    Magnesium 1.5 (L) 1.6 - 2.6 mg/dL   POC LACTIC ACID    Collection Time: 08/16/21 10:52 AM   Result Value Ref Range    Lactic Acid (POC) 2.36 (HH) 0.40 - 2.00 mmol/L   INFLUENZA A & B AG (RAPID TEST)    Collection Time: 08/16/21 10:54 AM   Result Value Ref Range    Influenza A Antigen Negative NEG      Influenza B Antigen Negative NEG     COVID-19 RAPID TEST    Collection Time: 08/16/21 11:00 AM   Result Value Ref Range    Specimen source Nasopharyngeal      COVID-19 rapid test Not detected NOTD     POC LACTIC ACID    Collection Time: 08/16/21 11:30 AM   Result Value Ref Range    Lactic Acid (POC) 3.05 (HH) 0.40 - 2.00 mmol/L   URINALYSIS W/ RFLX MICROSCOPIC    Collection Time: 08/16/21 12:35 PM   Result Value Ref Range    Color YELLOW      Appearance CLEAR      Specific gravity 1.016 1.005 - 1.030      pH (UA) 6.5 5.0 - 8.0      Protein 300 (A) NEG mg/dL    Glucose Negative NEG mg/dL    Ketone 15 (A) NEG mg/dL    Bilirubin Negative NEG      Blood LARGE (A) NEG      Urobilinogen 0.2 0.2 - 1.0 EU/dL    Nitrites Positive (A) NEG      Leukocyte Esterase MODERATE (A) NEG     URINE MICROSCOPIC ONLY    Collection Time: 08/16/21 12:35 PM   Result Value Ref Range    WBC TOO NUMEROUS TO COUNT 0 - 5 /hpf    RBC 36 to 50 0 - 5 /hpf    Epithelial cells 2+ 0 - 5 /lpf    Bacteria 3+ (A) NEG /hpf   POC LACTIC ACID    Collection Time: 08/16/21 12:41 PM   Result Value Ref Range    Lactic Acid (POC) 1.73 0.40 - 2.00 mmol/L     Labs Reviewed: All lab results for the last 24 hours reviewed. and EKG    Procedures/imaging: see electronic medical records for all procedures/Xrays and details which were not copied into this note but were reviewed prior to creation of Plan      Assessment/Plan     Active Problems:    Pyelonephritis (8/16/2021)      Sepsis ([facility] [address] (8/16/2021)      Hyponatremia (8/16/2021)      Hypokalemia (8/16/2021)      Leukocytosis (8/16/2021)    Plan:  1. Sepsis she started on broad-spectrum antibiotics in the form of cefepime and Levaquin  2. Pyelonephritis urine cultures obtained aggressive fluids antiemetics started  3. Hyponatremia hypokalemia diuretics discontinued given fluids and electrolyte replacement protocol  4. Possible early pneumonia urine streptococcal antigen and pneumococcal antigens obtained she should be covered with being on Levaquin for this  5. Fibromyalgia she will be continued on her Celexa  6. Neuropathy with chronic back pain she is continued on her Lyrica/gabapentin  DVT/GI Prophylaxis: Lovenox    7. Diabetes diet controlled placed on sliding scale insulin aggressive fluids for now because of is to be placed on a full liquid diet which can be advanced  8. Hypertension she is on diuretic which will be held as well as holding home losartan restart once blood pressure is elevated as she is low normal now  . 9. Right hip pain pending check urine drug screen if negative can use oxycodone.   As she is uncomfortable lying in bed  Richie Michael MD  8/16/2021 1:24 PM

## 2021-08-16 NOTE — PROGRESS NOTES
Pt vomiting clear vomitus after drinking water-medicated with zofran, MD aware. IVF infusing as ordered.

## 2021-08-16 NOTE — PROGRESS NOTES
Admitted restless female pt from ER. Pt afebrile but shaking. IVF infusing as ordered. Assisted pt to BR, voided clear yellow urine.

## 2021-08-17 LAB
ALBUMIN SERPL-MCNC: 2.5 G/DL (ref 3.4–5)
ALBUMIN/GLOB SERPL: 0.8 {RATIO} (ref 0.8–1.7)
ALP SERPL-CCNC: 72 U/L (ref 45–117)
ALT SERPL-CCNC: 16 U/L (ref 13–56)
ANION GAP SERPL CALC-SCNC: 5 MMOL/L (ref 3–18)
AST SERPL-CCNC: 14 U/L (ref 10–38)
BACTERIA SPEC CULT: NORMAL
BASOPHILS # BLD: 0 K/UL (ref 0–0.1)
BASOPHILS NFR BLD: 0 % (ref 0–2)
BILIRUB SERPL-MCNC: 0.4 MG/DL (ref 0.2–1)
BUN SERPL-MCNC: 9 MG/DL (ref 7–18)
BUN/CREAT SERPL: 18 (ref 12–20)
CALCIUM SERPL-MCNC: 7.8 MG/DL (ref 8.5–10.1)
CHLORIDE SERPL-SCNC: 105 MMOL/L (ref 100–111)
CO2 SERPL-SCNC: 27 MMOL/L (ref 21–32)
CREAT SERPL-MCNC: 0.5 MG/DL (ref 0.6–1.3)
DIFFERENTIAL METHOD BLD: ABNORMAL
EOSINOPHIL # BLD: 0 K/UL (ref 0–0.4)
EOSINOPHIL NFR BLD: 0 % (ref 0–5)
ERYTHROCYTE [DISTWIDTH] IN BLOOD BY AUTOMATED COUNT: 15.2 % (ref 11.6–14.5)
GLOBULIN SER CALC-MCNC: 3.2 G/DL (ref 2–4)
GLUCOSE BLD STRIP.AUTO-MCNC: 101 MG/DL (ref 70–110)
GLUCOSE BLD STRIP.AUTO-MCNC: 104 MG/DL (ref 70–110)
GLUCOSE BLD STRIP.AUTO-MCNC: 108 MG/DL (ref 70–110)
GLUCOSE BLD STRIP.AUTO-MCNC: 97 MG/DL (ref 70–110)
GLUCOSE SERPL-MCNC: 124 MG/DL (ref 74–99)
HCT VFR BLD AUTO: 31.1 % (ref 35–45)
HGB BLD-MCNC: 10.4 G/DL (ref 12–16)
L PNEUMO AG UR QL IA: NEGATIVE
LYMPHOCYTES # BLD: 1.3 K/UL (ref 0.9–3.6)
LYMPHOCYTES NFR BLD: 9 % (ref 21–52)
MAGNESIUM SERPL-MCNC: 2 MG/DL (ref 1.6–2.6)
MCH RBC QN AUTO: 31.2 PG (ref 24–34)
MCHC RBC AUTO-ENTMCNC: 33.4 G/DL (ref 31–37)
MCV RBC AUTO: 93.4 FL (ref 74–97)
MONOCYTES # BLD: 0.8 K/UL (ref 0.05–1.2)
MONOCYTES NFR BLD: 6 % (ref 3–10)
NEUTS BAND NFR BLD MANUAL: 6 % (ref 0–5)
NEUTS SEG # BLD: 11.9 K/UL (ref 1.8–8)
NEUTS SEG NFR BLD: 79 % (ref 40–73)
PLATELET # BLD AUTO: 111 K/UL (ref 135–420)
PMV BLD AUTO: 13.2 FL (ref 9.2–11.8)
POTASSIUM SERPL-SCNC: 3 MMOL/L (ref 3.5–5.5)
PROT SERPL-MCNC: 5.7 G/DL (ref 6.4–8.2)
RBC # BLD AUTO: 3.33 M/UL (ref 4.2–5.3)
RBC MORPH BLD: ABNORMAL
S PNEUM AG UR QL: NEGATIVE
SERVICE CMNT-IMP: NORMAL
SODIUM SERPL-SCNC: 137 MMOL/L (ref 136–145)
WBC # BLD AUTO: 14 K/UL (ref 4.6–13.2)

## 2021-08-17 PROCEDURE — 83735 ASSAY OF MAGNESIUM: CPT

## 2021-08-17 PROCEDURE — C9113 INJ PANTOPRAZOLE SODIUM, VIA: HCPCS | Performed by: INTERNAL MEDICINE

## 2021-08-17 PROCEDURE — 65270000029 HC RM PRIVATE

## 2021-08-17 PROCEDURE — 74011636637 HC RX REV CODE- 636/637: Performed by: INTERNAL MEDICINE

## 2021-08-17 PROCEDURE — 74011250636 HC RX REV CODE- 250/636: Performed by: EMERGENCY MEDICINE

## 2021-08-17 PROCEDURE — 74011250637 HC RX REV CODE- 250/637: Performed by: INTERNAL MEDICINE

## 2021-08-17 PROCEDURE — 74011250636 HC RX REV CODE- 250/636: Performed by: INTERNAL MEDICINE

## 2021-08-17 PROCEDURE — 36415 COLL VENOUS BLD VENIPUNCTURE: CPT

## 2021-08-17 PROCEDURE — 74011000250 HC RX REV CODE- 250: Performed by: EMERGENCY MEDICINE

## 2021-08-17 PROCEDURE — 80053 COMPREHEN METABOLIC PANEL: CPT

## 2021-08-17 PROCEDURE — 82962 GLUCOSE BLOOD TEST: CPT

## 2021-08-17 PROCEDURE — 85025 COMPLETE CBC W/AUTO DIFF WBC: CPT

## 2021-08-17 RX ORDER — SODIUM CHLORIDE 9 MG/ML
50 INJECTION, SOLUTION INTRAVENOUS CONTINUOUS
Status: DISCONTINUED | OUTPATIENT
Start: 2021-08-17 | End: 2021-08-18

## 2021-08-17 RX ORDER — METOCLOPRAMIDE HYDROCHLORIDE 5 MG/ML
5 INJECTION INTRAMUSCULAR; INTRAVENOUS EVERY 6 HOURS
Status: DISCONTINUED | OUTPATIENT
Start: 2021-08-17 | End: 2021-08-18

## 2021-08-17 RX ORDER — POTASSIUM CHLORIDE 20 MEQ/1
40 TABLET, EXTENDED RELEASE ORAL EVERY 4 HOURS
Status: COMPLETED | OUTPATIENT
Start: 2021-08-17 | End: 2021-08-17

## 2021-08-17 RX ADMIN — LEVOFLOXACIN 750 MG: 5 INJECTION, SOLUTION INTRAVENOUS at 10:32

## 2021-08-17 RX ADMIN — CEFEPIME HYDROCHLORIDE 2 G: 2 INJECTION, POWDER, FOR SOLUTION INTRAVENOUS at 19:18

## 2021-08-17 RX ADMIN — ONDANSETRON 4 MG: 2 INJECTION INTRAMUSCULAR; INTRAVENOUS at 08:19

## 2021-08-17 RX ADMIN — Medication 10 ML: at 21:02

## 2021-08-17 RX ADMIN — PANTOPRAZOLE SODIUM 40 MG: 40 INJECTION, POWDER, FOR SOLUTION INTRAVENOUS at 08:20

## 2021-08-17 RX ADMIN — INSULIN LISPRO 2 UNITS: 100 INJECTION, SOLUTION INTRAVENOUS; SUBCUTANEOUS at 00:05

## 2021-08-17 RX ADMIN — POTASSIUM CHLORIDE 40 MEQ: 20 TABLET, EXTENDED RELEASE ORAL at 12:34

## 2021-08-17 RX ADMIN — CEFEPIME HYDROCHLORIDE 2 G: 2 INJECTION, POWDER, FOR SOLUTION INTRAVENOUS at 02:26

## 2021-08-17 RX ADMIN — CEFEPIME HYDROCHLORIDE 2 G: 2 INJECTION, POWDER, FOR SOLUTION INTRAVENOUS at 10:29

## 2021-08-17 RX ADMIN — ENOXAPARIN SODIUM 40 MG: 40 INJECTION SUBCUTANEOUS at 08:22

## 2021-08-17 RX ADMIN — ONDANSETRON 4 MG: 2 INJECTION INTRAMUSCULAR; INTRAVENOUS at 19:13

## 2021-08-17 RX ADMIN — SODIUM CHLORIDE 150 ML/HR: 900 INJECTION, SOLUTION INTRAVENOUS at 08:01

## 2021-08-17 RX ADMIN — Medication 10 ML: at 00:04

## 2021-08-17 RX ADMIN — POTASSIUM CHLORIDE 40 MEQ: 20 TABLET, EXTENDED RELEASE ORAL at 10:06

## 2021-08-17 RX ADMIN — ACETAMINOPHEN 650 MG: 325 TABLET ORAL at 04:15

## 2021-08-17 RX ADMIN — SODIUM CHLORIDE 150 ML/HR: 900 INJECTION, SOLUTION INTRAVENOUS at 21:02

## 2021-08-17 RX ADMIN — METOCLOPRAMIDE HYDROCHLORIDE 5 MG: 5 INJECTION INTRAMUSCULAR; INTRAVENOUS at 21:02

## 2021-08-17 NOTE — PROGRESS NOTES
2960 - Bedside report received from [facility], [facility]. Patient in bed. Pain 2/10.     2352 - Patient in bed at this time. IV to R  AC and LA  intact and patent.  + CMS. Pt A & O x 4. LS clear, on RA. Abdomen soft, NT and ND. + BS to all 4 quadrants. Denies nausea. Pain 3/10. Call light within reach. Pt had an uneventful shift. Pain remained well-controlled with the ordered medication. No issues/concerns at this time.  Call bell within reach

## 2021-08-17 NOTE — ROUTINE PROCESS
Bedside and Verbal shift change report given to [facility], RN by Michael Armstrong. Report included the following information SBAR, Kardex, OR Summary, Intake/Output and MAR.

## 2021-08-17 NOTE — PROGRESS NOTES
Reason for Admission:  Pyelonephritis                     RUR Score:   Low; 12%                  Plan for utilizing home health:     Unlikely      PCP: First and Last name:  Sammi Christensen DO     Name of Practice:    Are you a current patient: Yes/No:    Approximate date of last visit:    Can you participate in a virtual visit with your PCP:                     Current Advanced Directive/Advance Care Plan: Full Code      Healthcare Decision Maker:   Click here to complete [address] including selection of the Healthcare Decision Maker Relationship (ie \"Primary\")                             Transition of Care Plan:   Home with physician follow up                    Chart reviewed. Per H&P \"Patient is a 60-year-old female with history of fibromyalgia pain who presents to the emergency room after 3-day history of high fever that started this morning worsening back pain over the last 2 days chills associated with nausea vomiting vague left quadrant pain. She came to the emergency room as she is on symptoms of high fever for in the emergency room rapid Covid was negative blood cultures and urine cultures were obtained CT scan did confirm pyonephritis no other abnormalities of the lung except for early pneumonia patient states she is also been coughing some and congested has used Robitussin with no improvement. Patient is fully vaccinated against [id number]-28 her last dose was in March she took [employer] vaccine she denies any COVID-19 exposures. \"    Please encourage ambulation as appropriate to assist with identifying a transition of care. CM to continue to follow and assist.    1305:  CM met with pt at bedside to discuss transition of care. Pt lives with her adult autistic son and boyfriend. Pt is independent and has access to a cane if needed. Anticipate pt will transition home with physician follow up when medically stable.   Please encourage ambulation as appropriate to assist with identifying potential transition of care needs. CM to continue to follow and assist.      Care Management Interventions  Mode of Transport at Discharge:  Other (see comment) (Family/Friend)  Transition of Care Consult (CM Consult): Discharge Planning  Health Maintenance Reviewed: Yes  The Plan for Transition of Care is Related to the Following Treatment Goals : Home with physician follow up   Discharge Location  Discharge Placement: Home with family assistance

## 2021-08-17 NOTE — PROGRESS NOTES
192-Paged MD brendon holloway because IV fluids  but pt has been nausea and unable to keep down food or eat. 192-Verbal shift change report given to Alexandre RN by Amie Burns RN. Report included the following information SBAR, Kardex,  Summary, Intake/Output and MAR.

## 2021-08-17 NOTE — PROGRESS NOTES
Hospitalist Progress Note    Patient: Tracy Bernal MRN: [id number]  CSN: [id number]    YOB: 1960  Age: 64 y.o. Sex: female    DOA: 8/16/2021 LOS:  LOS: 1 day          Chief Complaint:    Patient is a 57-year-old female with history of fibromyalgia pain who presents to the emergency room after 3-day history of high fever that started this morning worsening back pain over the last 2 days chills associated with nausea vomiting vague left quadrant pain. She came to the emergency room as she is on symptoms of high fever for in the emergency room rapid Covid was negative blood cultures and urine cultures were obtained CT scan did confirm pyonephritis no other abnormalities of the lung except for early pneumonia patient states she is also been coughing some and congested has used Robitussin with no improvement. Patient is fully vaccinated against [id number]-41 her last dose was in March she took [employer] vaccine she denies any COVID-19 exposures    Feeling better less back pain last Temp at 4 am  Starting to feel hungry    Assessment/Plan   Sepsis she started on broad-spectrum antibiotics in the form of cefepime and Levaquin  2. Pyelonephritis urine cultures obtained aggressive fluids antiemetics started  3. Hyponatremia hypokalemia diuretics discontinued given fluids and electrolyte replacement protocol  4. Possible early pneumonia urine streptococcal antigen and pneumococcal antigens obtained she should be covered with being on Levaquin for this  5. Fibromyalgia she will be continued on her Celexa  6. Neuropathy with chronic back pain she is continued on her Lyrica/gabapentin  DVT/GI Prophylaxis: Lovenox     7. Diabetes diet controlled placed on sliding scale insulin aggressive fluids for now because of is to be placed on a full liquid diet which can be advanced  8.   Hypertension she is on diuretic which will be held as well as holding home losartan restart once blood pressure is elevated as she is low normal now  . 9. 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   Last three shifts:  08/15 1901 - 08/17 0700  In: 5451 [P.O.:340; I.V.:5111]  Out: 800 [Urine:800]    Exam:    General: Well developed, alert, NAD, OX3  Head/Neck: NCAT, supple, No masses, No lymphadenopathy  CVS:Regular rate and rhythm, no M/R/G, S1/S2 heard, no thrill  Lungs:Clear to auscultation bilaterally, no wheezes, rhonchi, or rales  Abdomen: Soft, Nontender, No distention, Normal Bowel sounds, No hepatomegaly  Extremities: No C/C/E, pulses palpable 2+  Skin:normal texture and turgor, no rashes, no lesions  Neuro:grossly normal , follows commands  Psych:appropriate                Labs: Results:       Chemistry Recent Labs     08/17/21  0455 08/16/21  1045   * 192*    134*   K 3.0* 3.2*    99*   CO2 27 24   BUN 9 13   CREA 0.50* 0.72   CA 7.8* 8.7   AGAP 5 11   BUCR 18 18   AP 72 92   TP 5.7* 7.0   ALB 2.5* 3.2*   GLOB 3.2 3.8   AGRAT 0.8 0.8      CBC w/Diff Recent Labs     08/17/21  0455 08/16/21  1045   WBC 14.0* 19.1*   RBC 3.33* 3.99*   HGB 10.4* 12.2   HCT 31.1* 36.3   * 144   GRANS 79* 90*   LYMPH 9* 5*   EOS 0 0      Cardiac Enzymes No results for input(s): CPK, CKND1, SALONI in the last 72 hours. No lab exists for component: CKRMB, TROIP   Coagulation No results for input(s): PTP, INR, APTT, INREXT in the last 72 hours. Lipid Panel No results found for: CHOL, CHOLPOCT, CHOLX, CHLST, CHOLV, 932424, HDL, HDLP, LDL, LDLC, DLDLP, 180500, VLDLC, VLDL, TGLX, TRIGL, TRIGP, TGLPOCT, CHHD, CHHDX   BNP No results for input(s): BNPP in the last 72 hours.    Liver Enzymes Recent Labs     08/17/21  0455   TP 5.7*   ALB 2.5*   AP 72      Thyroid Studies No results found for: T4, T3U, TSH, TSHEXT     Procedures/imaging: see electronic medical records for all procedures/Xrays and details which were not copied into this note but were reviewed prior to creation of Casi Tracy MD

## 2021-08-18 ENCOUNTER — APPOINTMENT (OUTPATIENT)
Dept: NON INVASIVE DIAGNOSTICS | Age: 61
DRG: 872 | End: 2021-08-18
Attending: HOSPITALIST
Payer: MEDICARE

## 2021-08-18 LAB
ALBUMIN SERPL-MCNC: 2.4 G/DL (ref 3.4–5)
ALBUMIN/GLOB SERPL: 0.8 {RATIO} (ref 0.8–1.7)
ALP SERPL-CCNC: 75 U/L (ref 45–117)
ALT SERPL-CCNC: 16 U/L (ref 13–56)
ANION GAP SERPL CALC-SCNC: 8 MMOL/L (ref 3–18)
AST SERPL-CCNC: 14 U/L (ref 10–38)
BASOPHILS # BLD: 0 K/UL (ref 0–0.1)
BASOPHILS NFR BLD: 0 % (ref 0–2)
BILIRUB SERPL-MCNC: 0.5 MG/DL (ref 0.2–1)
BUN SERPL-MCNC: 7 MG/DL (ref 7–18)
BUN/CREAT SERPL: 13 (ref 12–20)
CALCIUM SERPL-MCNC: 8.3 MG/DL (ref 8.5–10.1)
CHLORIDE SERPL-SCNC: 105 MMOL/L (ref 100–111)
CO2 SERPL-SCNC: 23 MMOL/L (ref 21–32)
CREAT SERPL-MCNC: 0.55 MG/DL (ref 0.6–1.3)
D DIMER PPP FEU-MCNC: 2.89 UG/ML(FEU)
DIFFERENTIAL METHOD BLD: ABNORMAL
ECHO AO ROOT DIAM: 2.97 CM
ECHO AV AREA PEAK VELOCITY: 2.78 CM2
ECHO AV AREA VTI: 2.71 CM2
ECHO AV AREA/BSA PEAK VELOCITY: 1.4 CM2/M2
ECHO AV AREA/BSA VTI: 1.3 CM2/M2
ECHO AV MEAN GRADIENT: 4.2 MMHG
ECHO AV PEAK GRADIENT: 5.73 MMHG
ECHO AV PEAK VELOCITY: 119.64 CM/S
ECHO AV VTI: 22.18 CM
ECHO EST RA PRESSURE: 3 MMHG
ECHO IVC PROX: 1.88 CM
ECHO LA MAJOR AXIS: 4.11 CM
ECHO LA MINOR AXIS: 2.04 CM
ECHO LA VOL 2C: 46.54 ML (ref 22–52)
ECHO LA VOL 4C: 56.28 ML (ref 22–52)
ECHO LA VOL BP: 55.73 ML (ref 22–52)
ECHO LA VOL/BSA BIPLANE: 27.73 ML/M2 (ref 16–28)
ECHO LA VOLUME INDEX A2C: 23.15 ML/M2 (ref 16–28)
ECHO LA VOLUME INDEX A4C: 28 ML/M2 (ref 16–28)
ECHO LV E' LATERAL VELOCITY: 18 CM/S
ECHO LV E' SEPTAL VELOCITY: 16 CM/S
ECHO LV EDV A2C: 78.44 ML
ECHO LV EDV A4C: 109.07 ML
ECHO LV EDV BP: 93.64 ML (ref 56–104)
ECHO LV EDV INDEX A4C: 54.3 ML/M2
ECHO LV EDV INDEX BP: 46.6 ML/M2
ECHO LV EDV NDEX A2C: 39 ML/M2
ECHO LV EJECTION FRACTION A2C: 37 PERCENT
ECHO LV EJECTION FRACTION A4C: 48 PERCENT
ECHO LV EJECTION FRACTION BIPLANE: 41.1 PERCENT (ref 55–100)
ECHO LV ESV A2C: 49.23 ML
ECHO LV ESV A4C: 57.25 ML
ECHO LV ESV BP: 55.13 ML (ref 19–49)
ECHO LV ESV INDEX A2C: 24.5 ML/M2
ECHO LV ESV INDEX A4C: 28.5 ML/M2
ECHO LV ESV INDEX BP: 27.4 ML/M2
ECHO LV INTERNAL DIMENSION DIASTOLIC: 4.69 CM (ref 3.9–5.3)
ECHO LV INTERNAL DIMENSION SYSTOLIC: 3.74 CM
ECHO LV IVSD: 1.28 CM (ref 0.6–0.9)
ECHO LV MASS 2D: 210 G (ref 67–162)
ECHO LV MASS INDEX 2D: 104.5 G/M2 (ref 43–95)
ECHO LV POSTERIOR WALL DIASTOLIC: 1.11 CM (ref 0.6–0.9)
ECHO LVOT DIAM: 2.11 CM
ECHO LVOT PEAK GRADIENT: 3.62 MMHG
ECHO LVOT PEAK VELOCITY: 95.07 CM/S
ECHO LVOT SV: 60 ML
ECHO LVOT VTI: 17.16 CM
ECHO MV E DECELERATION TIME (DT): 126 MS
ECHO MV E VELOCITY: 109 CM/S
ECHO MV E/E' LATERAL: 6.06
ECHO MV E/E' RATIO (AVERAGED): 6.43
ECHO MV E/E' SEPTAL: 6.81
ECHO RA AREA 4C: 23.48 CM2
ECHO RV INTERNAL DIMENSION: 3.86 CM
ECHO RV TAPSE: 1.7 CM (ref 1.5–2)
ECHO TV REGURGITANT MAX VELOCITY: 283.3 CM/S
ECHO TV REGURGITANT PEAK GRADIENT: 32.1 MMHG
EOSINOPHIL # BLD: 0 K/UL (ref 0–0.4)
EOSINOPHIL NFR BLD: 0 % (ref 0–5)
ERYTHROCYTE [DISTWIDTH] IN BLOOD BY AUTOMATED COUNT: 15.1 % (ref 11.6–14.5)
GLOBULIN SER CALC-MCNC: 3.2 G/DL (ref 2–4)
GLUCOSE BLD STRIP.AUTO-MCNC: 106 MG/DL (ref 70–110)
GLUCOSE BLD STRIP.AUTO-MCNC: 120 MG/DL (ref 70–110)
GLUCOSE BLD STRIP.AUTO-MCNC: 121 MG/DL (ref 70–110)
GLUCOSE BLD STRIP.AUTO-MCNC: 147 MG/DL (ref 70–110)
GLUCOSE BLD STRIP.AUTO-MCNC: 151 MG/DL (ref 70–110)
GLUCOSE SERPL-MCNC: 119 MG/DL (ref 74–99)
HCT VFR BLD AUTO: 29.8 % (ref 35–45)
HGB BLD-MCNC: 10 G/DL (ref 12–16)
LVOT MG: 2.62 MMHG
LYMPHOCYTES # BLD: 1.2 K/UL (ref 0.9–3.6)
LYMPHOCYTES NFR BLD: 10 % (ref 21–52)
MAGNESIUM SERPL-MCNC: 1.9 MG/DL (ref 1.6–2.6)
MCH RBC QN AUTO: 30.4 PG (ref 24–34)
MCHC RBC AUTO-ENTMCNC: 33.6 G/DL (ref 31–37)
MCV RBC AUTO: 90.6 FL (ref 74–97)
MONOCYTES # BLD: 0.2 K/UL (ref 0.05–1.2)
MONOCYTES NFR BLD: 2 % (ref 3–10)
NEUTS BAND NFR BLD MANUAL: 3 % (ref 0–5)
NEUTS SEG # BLD: 10.3 K/UL (ref 1.8–8)
NEUTS SEG NFR BLD: 85 % (ref 40–73)
PLATELET # BLD AUTO: 116 K/UL (ref 135–420)
PLATELET COMMENTS,PCOM: ABNORMAL
PMV BLD AUTO: 13.8 FL (ref 9.2–11.8)
POTASSIUM SERPL-SCNC: 3.3 MMOL/L (ref 3.5–5.5)
PROT SERPL-MCNC: 5.6 G/DL (ref 6.4–8.2)
RBC # BLD AUTO: 3.29 M/UL (ref 4.2–5.3)
RBC MORPH BLD: ABNORMAL
SODIUM SERPL-SCNC: 136 MMOL/L (ref 136–145)
TSH SERPL DL<=0.05 MIU/L-ACNC: 0.56 UIU/ML (ref 0.36–3.74)
WBC # BLD AUTO: 11.7 K/UL (ref 4.6–13.2)

## 2021-08-18 PROCEDURE — 84443 ASSAY THYROID STIM HORMONE: CPT

## 2021-08-18 PROCEDURE — 82962 GLUCOSE BLOOD TEST: CPT

## 2021-08-18 PROCEDURE — 74011250636 HC RX REV CODE- 250/636: Performed by: EMERGENCY MEDICINE

## 2021-08-18 PROCEDURE — 74011250637 HC RX REV CODE- 250/637: Performed by: INTERNAL MEDICINE

## 2021-08-18 PROCEDURE — 93306 TTE W/DOPPLER COMPLETE: CPT

## 2021-08-18 PROCEDURE — 74011250636 HC RX REV CODE- 250/636: Performed by: HOSPITALIST

## 2021-08-18 PROCEDURE — 80053 COMPREHEN METABOLIC PANEL: CPT

## 2021-08-18 PROCEDURE — 85379 FIBRIN DEGRADATION QUANT: CPT

## 2021-08-18 PROCEDURE — C9113 INJ PANTOPRAZOLE SODIUM, VIA: HCPCS | Performed by: INTERNAL MEDICINE

## 2021-08-18 PROCEDURE — 74011000250 HC RX REV CODE- 250: Performed by: HOSPITALIST

## 2021-08-18 PROCEDURE — 36415 COLL VENOUS BLD VENIPUNCTURE: CPT

## 2021-08-18 PROCEDURE — 65660000000 HC RM CCU STEPDOWN

## 2021-08-18 PROCEDURE — 74011000250 HC RX REV CODE- 250: Performed by: EMERGENCY MEDICINE

## 2021-08-18 PROCEDURE — 93005 ELECTROCARDIOGRAM TRACING: CPT

## 2021-08-18 PROCEDURE — 74011250636 HC RX REV CODE- 250/636: Performed by: INTERNAL MEDICINE

## 2021-08-18 PROCEDURE — 74011000258 HC RX REV CODE- 258: Performed by: HOSPITALIST

## 2021-08-18 PROCEDURE — 85025 COMPLETE CBC W/AUTO DIFF WBC: CPT

## 2021-08-18 PROCEDURE — 83735 ASSAY OF MAGNESIUM: CPT

## 2021-08-18 PROCEDURE — 74011250637 HC RX REV CODE- 250/637: Performed by: HOSPITALIST

## 2021-08-18 RX ORDER — LORAZEPAM 2 MG/ML
2 INJECTION INTRAMUSCULAR
Status: DISCONTINUED | OUTPATIENT
Start: 2021-08-18 | End: 2021-08-20

## 2021-08-18 RX ORDER — SODIUM CHLORIDE 0.9 % (FLUSH) 0.9 %
5-40 SYRINGE (ML) INJECTION AS NEEDED
Status: DISCONTINUED | OUTPATIENT
Start: 2021-08-18 | End: 2021-08-21 | Stop reason: HOSPADM

## 2021-08-18 RX ORDER — SODIUM CHLORIDE 0.9 % (FLUSH) 0.9 %
5-40 SYRINGE (ML) INJECTION EVERY 8 HOURS
Status: DISCONTINUED | OUTPATIENT
Start: 2021-08-18 | End: 2021-08-21 | Stop reason: HOSPADM

## 2021-08-18 RX ORDER — LOSARTAN POTASSIUM 50 MG/1
50 TABLET ORAL DAILY
Status: DISCONTINUED | OUTPATIENT
Start: 2021-08-18 | End: 2021-08-19

## 2021-08-18 RX ORDER — LORAZEPAM 2 MG/ML
3 INJECTION INTRAMUSCULAR
Status: DISCONTINUED | OUTPATIENT
Start: 2021-08-18 | End: 2021-08-20

## 2021-08-18 RX ORDER — METOPROLOL TARTRATE 25 MG/1
25 TABLET, FILM COATED ORAL EVERY 12 HOURS
Status: DISCONTINUED | OUTPATIENT
Start: 2021-08-18 | End: 2021-08-21 | Stop reason: HOSPADM

## 2021-08-18 RX ORDER — ENOXAPARIN SODIUM 100 MG/ML
1 INJECTION SUBCUTANEOUS DAILY
Status: DISCONTINUED | OUTPATIENT
Start: 2021-08-19 | End: 2021-08-20

## 2021-08-18 RX ORDER — DIGOXIN 0.25 MG/ML
250 INJECTION INTRAMUSCULAR; INTRAVENOUS
Status: COMPLETED | OUTPATIENT
Start: 2021-08-18 | End: 2021-08-18

## 2021-08-18 RX ORDER — LORAZEPAM 1 MG/1
2 TABLET ORAL
Status: DISCONTINUED | OUTPATIENT
Start: 2021-08-18 | End: 2021-08-20

## 2021-08-18 RX ORDER — LORAZEPAM 2 MG/ML
1 INJECTION INTRAMUSCULAR
Status: DISCONTINUED | OUTPATIENT
Start: 2021-08-18 | End: 2021-08-20

## 2021-08-18 RX ORDER — POTASSIUM CHLORIDE 20 MEQ/1
40 TABLET, EXTENDED RELEASE ORAL
Status: COMPLETED | OUTPATIENT
Start: 2021-08-18 | End: 2021-08-18

## 2021-08-18 RX ORDER — CITALOPRAM 10 MG/1
10 TABLET ORAL DAILY
Status: DISCONTINUED | OUTPATIENT
Start: 2021-08-18 | End: 2021-08-21 | Stop reason: HOSPADM

## 2021-08-18 RX ORDER — LORAZEPAM 1 MG/1
1 TABLET ORAL
Status: DISCONTINUED | OUTPATIENT
Start: 2021-08-18 | End: 2021-08-20

## 2021-08-18 RX ORDER — GABAPENTIN 100 MG/1
200 CAPSULE ORAL 3 TIMES DAILY
Status: DISCONTINUED | OUTPATIENT
Start: 2021-08-18 | End: 2021-08-21 | Stop reason: HOSPADM

## 2021-08-18 RX ADMIN — LORAZEPAM 1 MG: 2 INJECTION INTRAMUSCULAR; INTRAVENOUS at 11:40

## 2021-08-18 RX ADMIN — ACETAMINOPHEN 650 MG: 325 TABLET ORAL at 06:09

## 2021-08-18 RX ADMIN — SODIUM CHLORIDE 150 ML/HR: 900 INJECTION, SOLUTION INTRAVENOUS at 02:11

## 2021-08-18 RX ADMIN — METOCLOPRAMIDE HYDROCHLORIDE 5 MG: 5 INJECTION INTRAMUSCULAR; INTRAVENOUS at 06:08

## 2021-08-18 RX ADMIN — LOSARTAN POTASSIUM 50 MG: 50 TABLET, FILM COATED ORAL at 15:50

## 2021-08-18 RX ADMIN — METOCLOPRAMIDE HYDROCHLORIDE 5 MG: 5 INJECTION INTRAMUSCULAR; INTRAVENOUS at 00:07

## 2021-08-18 RX ADMIN — CEFEPIME HYDROCHLORIDE 2 G: 2 INJECTION, POWDER, FOR SOLUTION INTRAVENOUS at 11:17

## 2021-08-18 RX ADMIN — METOPROLOL TARTRATE 25 MG: 25 TABLET, FILM COATED ORAL at 11:17

## 2021-08-18 RX ADMIN — GABAPENTIN 200 MG: 100 CAPSULE ORAL at 21:59

## 2021-08-18 RX ADMIN — CITALOPRAM HYDROBROMIDE 10 MG: 10 TABLET ORAL at 15:50

## 2021-08-18 RX ADMIN — Medication 10 ML: at 06:10

## 2021-08-18 RX ADMIN — CEFEPIME HYDROCHLORIDE 2 G: 2 INJECTION, POWDER, FOR SOLUTION INTRAVENOUS at 19:09

## 2021-08-18 RX ADMIN — POTASSIUM CHLORIDE 40 MEQ: 1500 TABLET, EXTENDED RELEASE ORAL at 09:50

## 2021-08-18 RX ADMIN — GABAPENTIN 200 MG: 100 CAPSULE ORAL at 15:50

## 2021-08-18 RX ADMIN — ACETAMINOPHEN 650 MG: 325 TABLET ORAL at 16:01

## 2021-08-18 RX ADMIN — DIGOXIN 250 MCG: 250 INJECTION, SOLUTION INTRAMUSCULAR; INTRAVENOUS; PARENTERAL at 16:31

## 2021-08-18 RX ADMIN — PANTOPRAZOLE SODIUM 40 MG: 40 INJECTION, POWDER, FOR SOLUTION INTRAVENOUS at 09:50

## 2021-08-18 RX ADMIN — CEFEPIME HYDROCHLORIDE 2 G: 2 INJECTION, POWDER, FOR SOLUTION INTRAVENOUS at 02:10

## 2021-08-18 RX ADMIN — DEXTROSE MONOHYDRATE 5 MG/HR: 50 INJECTION, SOLUTION INTRAVENOUS at 13:05

## 2021-08-18 RX ADMIN — Medication 10 ML: at 13:06

## 2021-08-18 RX ADMIN — ENOXAPARIN SODIUM 40 MG: 40 INJECTION SUBCUTANEOUS at 09:48

## 2021-08-18 RX ADMIN — LEVOFLOXACIN 750 MG: 5 INJECTION, SOLUTION INTRAVENOUS at 11:17

## 2021-08-18 NOTE — PROGRESS NOTES
Hospitalist Progress Note    Patient: Clayton Phoenix MRN: [id number]  CSN: [id number]    YOB: 1960  Age: 64 y.o.   Sex: female    DOA: 8/16/2021 LOS:  LOS: 2 days          Chief Complaint:  sepsis        Assessment/Plan   63 yo female came with sepsis, possible pyelo    Developed new afib with RVR this am symptomatic with palpitations    Sepsis   New onset afib  Pyelonephritis   Hyponatremia hypokalemia -PO Kdur  Possible early pneumonia  Fibromyalgia - Celexa  Neuropathy with chronic back pain- Lyrica/gabapentin  Diabetes diet controlled   Hypertension   Right hip pain Chronic    UDS THC and opiate positive    Blood/urine cx neg    Fever down  Wbc normalized    Echo ordred  Check TSH level  Place on continuous cardiac montioring  cardizem gtt if PO lopressor does not bring HR down  Increase lovenox to therapeutic dose for thromboembolic prevention  Gentle fluids can be stopped  Ativan as needed for anxiety, also verified and resumed her home meds-neurontin, cozaar, celexa        Disposition :  Patient Active Problem List   Diagnosis Code    Pyelonephritis N12    Sepsis ([facility] [address] A41.9    Hyponatremia E87.1    Hypokalemia E87.6    Leukocytosis D72.829    Benign essential hypertension I10    Calculus of kidney N20.0    Chronic bilateral low back pain with right-sided sciatica M54.41, G89.29    Cobalamin deficiency E53.8    Right hip pain M25.551    Diabetes type 2, 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    Gastroesophageal reflux disease K21.9    Peripheral polyneuropathy G62.9       Subjective:  Palpitation this am  ekg ordred  Confirmed afib with RVR  Patient had been feeling better overall though she states  Some low back pain from the bed  But no fevers, hs eaten, denies cough or SOB  No dysuria      Review of systems:    Constitutional: denies fevers, chills  Respiratory: denies SOB  Cardiovascular: denies chest pain  Gastrointestinal: denies nausea, vomiting, diarrhea      Vital signs/Intake and Output:  Visit Vitals  /62 (BP 1 Location: Left upper arm, BP Patient Position: At rest;Lying)   Pulse 91   Temp 99.4 °F (37.4 °C)   Resp 18   Ht 5' 4\" (1.626 m)   Wt 97 kg (213 lb 13.5 oz)   SpO2 98%   Breastfeeding No   BMI 36.71 kg/m²     Current Shift:  08/18 0701 - 08/18 1900  In: -   Out: 350 [Urine:350]  Last three shifts:  08/16 1901 - 08/18 0700  In: 3960 [P.O.:340; I.V.:3620]  Out: 2500 [Urine:2400]    Exam:    General: Well developed, alert, NAD, OX3  CVS:mild tachy, no MRG  Lungs:Clear to auscultation bilaterally, no wheezes, rhonchi, or rales  Abdomen: Soft, Nontender, No distention, Normal Bowel sounds, No hepatomegaly  Extremities: No C/C/E, pulses palpable 2+  Neuro:grossly normal , follows commands  Psych:appropriate                Labs: Results:       Chemistry Recent Labs     08/18/21  0403 08/17/21  0455 08/16/21  1045   * 124* 192*    137 134*   K 3.3* 3.0* 3.2*    105 99*   CO2 23 27 24   BUN 7 9 13   CREA 0.55* 0.50* 0.72   CA 8.3* 7.8* 8.7   AGAP 8 5 11   BUCR 13 18 18   AP 75 72 92   TP 5.6* 5.7* 7.0   ALB 2.4* 2.5* 3.2*   GLOB 3.2 3.2 3.8   AGRAT 0.8 0.8 0.8      CBC w/Diff Recent Labs     08/18/21  0403 08/17/21  0455 08/16/21  1045   WBC 11.7 14.0* 19.1*   RBC 3.29* 3.33* 3.99*   HGB 10.0* 10.4* 12.2   HCT 29.8* 31.1* 36.3   * 111* 144   GRANS 85* 79* 90*   LYMPH 10* 9* 5*   EOS 0 0 0      Cardiac Enzymes No results for input(s): CPK, CKND1, SALONI in the last 72 hours. No lab exists for component: CKRMB, TROIP   Coagulation No results for input(s): PTP, INR, APTT, INREXT in the last 72 hours. Lipid Panel No results found for: CHOL, CHOLPOCT, CHOLX, CHLST, CHOLV, 710087, HDL, HDLP, LDL, LDLC, DLDLP, 694051, VLDLC, VLDL, TGLX, TRIGL, TRIGP, TGLPOCT, CHHD, CHHDX   BNP No results for input(s): BNPP in the last 72 hours.    Liver Enzymes Recent Labs     08/18/21  0403   TP 5.6*   ALB 2.4*   AP 75      Thyroid Studies No results found for: T4, T3U, TSH, TSHEXT     Procedures/imaging: see electronic medical records for all procedures/Xrays and details which were not copied into this note but were reviewed prior to creation of Shahab Mckeon MD

## 2021-08-18 NOTE — PROGRESS NOTES
Bedside and verbal shift change report recieved from [facility] given to Arpita Hernandez RN. Report included the following information SBAR, Kardex, Intake/Output, MAR, Recent Results and Cardiac Rhythm Sinus Tach       1554- Patient stable. HR holding at high 150's to low 160's with continous Cardizem drip. MEWS score 6, call placed to hospitalist, awaiting call back at this time. 1600- Received call back from Dr Jersey Moeller, advised of patient MEWS score. MD advised nurse will follow up with patient HR and temp. Advise will start Digoxin if needed. 1705- Patient resting abed quietly at this time. Resp even and non-labored. No s/s resp distress noted. No c/o pain or discomfort voiced. Continue to monitor HR. Patient received IV Digoxin. Bedside and verbal shift change report given to MINNIE Bojorquez (oncoming nurse) by Mary Olivia RN (offgoing nurse).  Report included the following information SBAR, Kardex, Intake/Output, MAR and Recent Results

## 2021-08-18 NOTE — PROGRESS NOTES
Pt transferred to [facility] on Cardizem gtt infusing at 10cc hr. Pt Sinus tach on monitor  at time of transfer. Report given to Isis Olivas.

## 2021-08-18 NOTE — PROGRESS NOTES
3710 - Patient in bed at this time. A/O x 4. IV to let hand  intact and patent. Pateint denies numbness/tingling. Pedal pulses palpable. Lungs clear, dimished on RA. Bowel sounds active to all quadrants. Patient able to get to 1200 on the incentive spirometer. Pain 0/10.     0945-CNA reported patient felt her heart was racing. Upon assessment, patient  and irregular apically. Will notify Dr. Librado Javed. 1000-Duran Gross ordered STAT EKG     1045-EKG shows Afib with RVR, notified Dr. Librado Javed.      1219-Report given to Priscila Mariee RN.

## 2021-08-18 NOTE — PROGRESS NOTES
Assumed care of female pt, Pt -150, Dr. Myles Miller aware, pt given ABT as ordered and po Metoprolol, scores 13 on CIWA, medicated with ativan as ordered.

## 2021-08-18 NOTE — PROGRESS NOTES
Assumed care of pt shortly after 1930. Pt A&Ox4, VSS. IV Reglan given as ordered. Pt stated that she feels a lot better and no longer that nauseous. Pt was able to finish her popsicles and has been drinking water without any issues. Pt ambulated to the bathroom a couple of times throughout shift. Pt sleeping at this time. Will continue to monitor. Report given to [employer] All questions answered.

## 2021-08-19 ENCOUNTER — APPOINTMENT (OUTPATIENT)
Dept: CT IMAGING | Age: 61
DRG: 872 | End: 2021-08-19
Attending: HOSPITALIST
Payer: MEDICARE

## 2021-08-19 LAB
ALBUMIN SERPL-MCNC: 2.4 G/DL (ref 3.4–5)
ALBUMIN/GLOB SERPL: 0.7 {RATIO} (ref 0.8–1.7)
ALP SERPL-CCNC: 78 U/L (ref 45–117)
ALT SERPL-CCNC: 25 U/L (ref 13–56)
ANION GAP SERPL CALC-SCNC: 5 MMOL/L (ref 3–18)
AST SERPL-CCNC: 24 U/L (ref 10–38)
BASOPHILS # BLD: 0 K/UL (ref 0–0.1)
BASOPHILS NFR BLD: 0 % (ref 0–2)
BILIRUB SERPL-MCNC: 0.4 MG/DL (ref 0.2–1)
BUN SERPL-MCNC: 5 MG/DL (ref 7–18)
BUN/CREAT SERPL: 9 (ref 12–20)
CALCIUM SERPL-MCNC: 8.3 MG/DL (ref 8.5–10.1)
CALCULATED R AXIS, ECG10: 80 DEGREES
CALCULATED T AXIS, ECG11: 49 DEGREES
CHLORIDE SERPL-SCNC: 104 MMOL/L (ref 100–111)
CO2 SERPL-SCNC: 29 MMOL/L (ref 21–32)
CREAT SERPL-MCNC: 0.56 MG/DL (ref 0.6–1.3)
DIAGNOSIS, 93000: NORMAL
DIFFERENTIAL METHOD BLD: ABNORMAL
EOSINOPHIL # BLD: 0.1 K/UL (ref 0–0.4)
EOSINOPHIL NFR BLD: 1 % (ref 0–5)
ERYTHROCYTE [DISTWIDTH] IN BLOOD BY AUTOMATED COUNT: 14.9 % (ref 11.6–14.5)
GLOBULIN SER CALC-MCNC: 3.5 G/DL (ref 2–4)
GLUCOSE BLD STRIP.AUTO-MCNC: 129 MG/DL (ref 70–110)
GLUCOSE BLD STRIP.AUTO-MCNC: 137 MG/DL (ref 70–110)
GLUCOSE BLD STRIP.AUTO-MCNC: 143 MG/DL (ref 70–110)
GLUCOSE BLD STRIP.AUTO-MCNC: 194 MG/DL (ref 70–110)
GLUCOSE SERPL-MCNC: 116 MG/DL (ref 74–99)
HCT VFR BLD AUTO: 32.3 % (ref 35–45)
HGB BLD-MCNC: 11 G/DL (ref 12–16)
LYMPHOCYTES # BLD: 1.8 K/UL (ref 0.9–3.6)
LYMPHOCYTES NFR BLD: 20 % (ref 21–52)
MAGNESIUM SERPL-MCNC: 1.8 MG/DL (ref 1.6–2.6)
MCH RBC QN AUTO: 30.5 PG (ref 24–34)
MCHC RBC AUTO-ENTMCNC: 34.1 G/DL (ref 31–37)
MCV RBC AUTO: 89.5 FL (ref 74–97)
MONOCYTES # BLD: 0.5 K/UL (ref 0.05–1.2)
MONOCYTES NFR BLD: 6 % (ref 3–10)
NEUTS SEG # BLD: 6.3 K/UL (ref 1.8–8)
NEUTS SEG NFR BLD: 72 % (ref 40–73)
PLATELET # BLD AUTO: 138 K/UL (ref 135–420)
PMV BLD AUTO: 14.3 FL (ref 9.2–11.8)
POTASSIUM SERPL-SCNC: 3.4 MMOL/L (ref 3.5–5.5)
PROT SERPL-MCNC: 5.9 G/DL (ref 6.4–8.2)
Q-T INTERVAL, ECG07: 308 MS
QRS DURATION, ECG06: 86 MS
QTC CALCULATION (BEZET), ECG08: 488 MS
RBC # BLD AUTO: 3.61 M/UL (ref 4.2–5.3)
SODIUM SERPL-SCNC: 138 MMOL/L (ref 136–145)
VENTRICULAR RATE, ECG03: 151 BPM
WBC # BLD AUTO: 8.8 K/UL (ref 4.6–13.2)

## 2021-08-19 PROCEDURE — 36415 COLL VENOUS BLD VENIPUNCTURE: CPT

## 2021-08-19 PROCEDURE — 74011250636 HC RX REV CODE- 250/636: Performed by: INTERNAL MEDICINE

## 2021-08-19 PROCEDURE — 74011000250 HC RX REV CODE- 250: Performed by: INTERNAL MEDICINE

## 2021-08-19 PROCEDURE — 71275 CT ANGIOGRAPHY CHEST: CPT

## 2021-08-19 PROCEDURE — 74011250636 HC RX REV CODE- 250/636: Performed by: FAMILY MEDICINE

## 2021-08-19 PROCEDURE — C9113 INJ PANTOPRAZOLE SODIUM, VIA: HCPCS | Performed by: INTERNAL MEDICINE

## 2021-08-19 PROCEDURE — 74011000250 HC RX REV CODE- 250: Performed by: EMERGENCY MEDICINE

## 2021-08-19 PROCEDURE — 74011000258 HC RX REV CODE- 258: Performed by: INTERNAL MEDICINE

## 2021-08-19 PROCEDURE — 74011250636 HC RX REV CODE- 250/636: Performed by: HOSPITALIST

## 2021-08-19 PROCEDURE — 82962 GLUCOSE BLOOD TEST: CPT

## 2021-08-19 PROCEDURE — 74011250637 HC RX REV CODE- 250/637: Performed by: INTERNAL MEDICINE

## 2021-08-19 PROCEDURE — 85025 COMPLETE CBC W/AUTO DIFF WBC: CPT

## 2021-08-19 PROCEDURE — 74011250636 HC RX REV CODE- 250/636: Performed by: EMERGENCY MEDICINE

## 2021-08-19 PROCEDURE — 74011000636 HC RX REV CODE- 636: Performed by: INTERNAL MEDICINE

## 2021-08-19 PROCEDURE — 65660000000 HC RM CCU STEPDOWN

## 2021-08-19 PROCEDURE — 74011250637 HC RX REV CODE- 250/637: Performed by: HOSPITALIST

## 2021-08-19 PROCEDURE — 80053 COMPREHEN METABOLIC PANEL: CPT

## 2021-08-19 PROCEDURE — 83735 ASSAY OF MAGNESIUM: CPT

## 2021-08-19 PROCEDURE — 99223 1ST HOSP IP/OBS HIGH 75: CPT | Performed by: INTERNAL MEDICINE

## 2021-08-19 RX ORDER — PANTOPRAZOLE SODIUM 40 MG/1
40 TABLET, DELAYED RELEASE ORAL
Status: DISCONTINUED | OUTPATIENT
Start: 2021-08-20 | End: 2021-08-21 | Stop reason: HOSPADM

## 2021-08-19 RX ORDER — DIGOXIN 0.25 MG/ML
250 INJECTION INTRAMUSCULAR; INTRAVENOUS
Status: COMPLETED | OUTPATIENT
Start: 2021-08-19 | End: 2021-08-19

## 2021-08-19 RX ORDER — POTASSIUM CHLORIDE 20 MEQ/1
40 TABLET, EXTENDED RELEASE ORAL
Status: COMPLETED | OUTPATIENT
Start: 2021-08-19 | End: 2021-08-19

## 2021-08-19 RX ADMIN — IOPAMIDOL 100 ML: 755 INJECTION, SOLUTION INTRAVENOUS at 17:35

## 2021-08-19 RX ADMIN — METOPROLOL TARTRATE 25 MG: 25 TABLET, FILM COATED ORAL at 08:34

## 2021-08-19 RX ADMIN — GABAPENTIN 200 MG: 100 CAPSULE ORAL at 22:11

## 2021-08-19 RX ADMIN — CEFEPIME HYDROCHLORIDE 2 G: 2 INJECTION, POWDER, FOR SOLUTION INTRAVENOUS at 12:05

## 2021-08-19 RX ADMIN — Medication 10 ML: at 14:00

## 2021-08-19 RX ADMIN — OXYCODONE 5 MG: 5 TABLET ORAL at 23:29

## 2021-08-19 RX ADMIN — DEXTROSE MONOHYDRATE 10 MG/HR: 50 INJECTION, SOLUTION INTRAVENOUS at 02:05

## 2021-08-19 RX ADMIN — LOSARTAN POTASSIUM 50 MG: 50 TABLET, FILM COATED ORAL at 08:34

## 2021-08-19 RX ADMIN — Medication 10 ML: at 22:12

## 2021-08-19 RX ADMIN — GABAPENTIN 200 MG: 100 CAPSULE ORAL at 08:33

## 2021-08-19 RX ADMIN — POTASSIUM CHLORIDE 40 MEQ: 20 TABLET, EXTENDED RELEASE ORAL at 08:33

## 2021-08-19 RX ADMIN — SODIUM CHLORIDE 500 ML: 900 INJECTION, SOLUTION INTRAVENOUS at 06:52

## 2021-08-19 RX ADMIN — ACETAMINOPHEN 650 MG: 325 TABLET ORAL at 04:16

## 2021-08-19 RX ADMIN — CEFEPIME HYDROCHLORIDE 2 G: 2 INJECTION, POWDER, FOR SOLUTION INTRAVENOUS at 22:11

## 2021-08-19 RX ADMIN — CITALOPRAM HYDROBROMIDE 10 MG: 10 TABLET ORAL at 08:34

## 2021-08-19 RX ADMIN — CEFEPIME HYDROCHLORIDE 2 G: 2 INJECTION, POWDER, FOR SOLUTION INTRAVENOUS at 04:15

## 2021-08-19 RX ADMIN — DIGOXIN 250 MCG: 250 INJECTION, SOLUTION INTRAMUSCULAR; INTRAVENOUS; PARENTERAL at 10:02

## 2021-08-19 RX ADMIN — GABAPENTIN 200 MG: 100 CAPSULE ORAL at 17:14

## 2021-08-19 RX ADMIN — LEVOFLOXACIN 750 MG: 5 INJECTION, SOLUTION INTRAVENOUS at 12:03

## 2021-08-19 RX ADMIN — ENOXAPARIN SODIUM 100 MG: 40 INJECTION SUBCUTANEOUS at 08:35

## 2021-08-19 RX ADMIN — PANTOPRAZOLE SODIUM 40 MG: 40 INJECTION, POWDER, FOR SOLUTION INTRAVENOUS at 08:35

## 2021-08-19 NOTE — PROGRESS NOTES
Clinical Pharmacy Note: IV to PO Automatic Conversion    Patient Name: Eulalia Grijalva   YOB: 1960   Medical Record Number: [id number]   Date of Admission: 8/16/2021    Daily Progress Note: 8/19/2021 3:51 PM     Please note the following medication(s) (protonix) has/have been changed from IV to PO based on the following critiera:    Patient is taking scheduled oral medications  Patient is tolerating tube feeds at goal rate or an NPO (except for meds), full liquid, soft or regular diet      Current Diet Orders  Active Orders   Diet    ADULT DIET Regular        New Order:  protonix 40 mg po qd    This IV to PO conversion is based on the [facility] P&T approved automatic conversion policy for eligible patients. Please call with questions.     Anu Kasper [facility]  Clinical Pharmacist  [phone]

## 2021-08-19 NOTE — CONSULTS
Cardiolology  Inpatient Consult      Patient: Mary Tao               Sex: female          DOA: 8/16/2021       YOB: 1960      Age:  64 y.o.        LOS:  LOS: 3 days      Mary Tao is a 64 y.o. female admitted for Pyelonephritis [N12]     Recommendations:  · Rate control as you are doing   · Anticoagulation  · Echo done  · Cardiology follow up     Impression:  · AFIB which has converted to sinus   · Multiple other problems as enumerated below  · Cardiomyopathy based on echo findings    Patient Active Problem List    Diagnosis Date Noted    Pyelonephritis 08/16/2021    Sepsis ([facility] 08/16/2021    Hyponatremia 08/16/2021    Hypokalemia 08/16/2021    Leukocytosis 08/16/2021    Diabetes type 2, no ocular involvement ([facility] 05/04/2021    Chronic bilateral low back pain with right-sided sciatica 01/18/2019    Right hip pain 01/18/2019    Peripheral polyneuropathy 12/14/2017    Benign essential hypertension 09/05/2013    Cobalamin deficiency 02/07/2013    Calculus of kidney 09/17/2012    Gastroesophageal reflux disease 09/29/2003      Tanisha Doshi DO  Past Medical History:   Diagnosis Date    Back pain     Fibromyalgia     Neuropathy       No past surgical history on file. Allergies   Allergen Reactions    Sulfa (Sulfonamide Antibiotics) Hives      No family history on file.    Current Facility-Administered Medications   Medication Dose Route Frequency    sodium chloride (NS) flush 5-40 mL  5-40 mL IntraVENous Q8H    sodium chloride (NS) flush 5-40 mL  5-40 mL IntraVENous PRN    LORazepam (ATIVAN) tablet 1 mg  1 mg Oral Q1H PRN    Or    LORazepam (ATIVAN) injection 1 mg  1 mg IntraVENous Q1H PRN    LORazepam (ATIVAN) tablet 2 mg  2 mg Oral Q1H PRN    Or    LORazepam (ATIVAN) injection 2 mg  2 mg IntraVENous Q1H PRN    LORazepam (ATIVAN) injection 3 mg  3 mg IntraVENous Q15MIN PRN    metoprolol tartrate (LOPRESSOR) tablet 25 mg  25 mg Oral Q12H    enoxaparin (LOVENOX) injection 100 mg  1 mg/kg SubCUTAneous DAILY    dilTIAZem (CARDIZEM) 125 mg in dextrose 5% 125 mL infusion  10 mg/hr IntraVENous CONTINUOUS    citalopram (CELEXA) tablet 10 mg  10 mg Oral DAILY    gabapentin (NEURONTIN) capsule 200 mg  200 mg Oral TID    losartan (COZAAR) tablet 50 mg  50 mg Oral DAILY    sodium chloride (NS) flush 5-10 mL  5-10 mL IntraVENous PRN    cefepime (MAXIPIME) 2 g in sterile water (preservative free) 10 mL IV syringe  2 g IntraVENous Q8H    levoFLOXacin (LEVAQUIN) 750 mg in D5W IVPB  750 mg IntraVENous Q24H    albuterol (PROVENTIL HFA, VENTOLIN HFA, PROAIR HFA) inhaler 2 Puff  2 Puff Inhalation Q4H PRN    sodium chloride (NS) flush 5-40 mL  5-40 mL IntraVENous Q8H    sodium chloride (NS) flush 5-40 mL  5-40 mL IntraVENous PRN    acetaminophen (TYLENOL) tablet 650 mg  650 mg Oral Q6H PRN    Or    acetaminophen (TYLENOL) suppository 650 mg  650 mg Rectal Q6H PRN    polyethylene glycol (MIRALAX) packet 17 g  17 g Oral DAILY PRN    ondansetron (ZOFRAN ODT) tablet 4 mg  4 mg Oral Q8H PRN    Or    ondansetron (ZOFRAN) injection 4 mg  4 mg IntraVENous Q6H PRN    pantoprazole (PROTONIX) injection 40 mg  40 mg IntraVENous DAILY    insulin lispro (HUMALOG) injection   SubCUTAneous AC&HS    glucose chewable tablet 16 g  4 Tablet Oral PRN    glucagon (GLUCAGEN) injection 1 mg  1 mg IntraMUSCular PRN    dextrose (D50W) injection syrg 12.5-25 g  25-50 mL IntraVENous PRN    oxyCODONE IR (ROXICODONE) tablet 5 mg  5 mg Oral Q4H PRN         Review of Symptoms:  Review of Systems  GENERAL: Patient alert, awake and oriented times 3, able to communicate full sentences and not in distress. HEENT: No change in vision, no earache, tinnitus, sore throat or sinus congestion. NECK: No pain or stiffness. PULMONARY: No shortness of breath,= cough or wheeze.    Cardiovascular: no pnd or orthopnea, no CP  GASTROINTESTINAL: No abdominal pain, nausea, vomiting or diarrhea, melena or bright red blood per rectum. GENITOURINARY:+ urinary frequency, urgency, hesitancy or dysuria. MUSCULOSKELETAL: No joint or muscle pain, no back pain, no recent trauma. DERMATOLOGIC: No rash, no itching, no lesions. ENDOCRINE: No polyuria, polydipsia, no heat or cold intolerance. No recent change in weight. HEMATOLOGICAL: No anemia or easy bruising or bleeding. NEUROLOGIC: No headache, seizures, numbness, tingling or weakness. Subjective:  Patient is a 58-year-old female with history of fibromyalgia pain who presents to the emergency room after 3-day history of high fever that started this morning worsening back pain over the last 2 days chills associated with nausea vomiting vague left quadrant pain. She came to the emergency room as she is on symptoms of high fever for in the emergency room rapid Covid was negative blood cultures and urine cultures were obtained CT scan did confirm pyonephritis no other abnormalities of the lung except for early pneumonia patient states she is also been coughing some and congested has used Robitussin with no improvement. Patient is fully vaccinated against MPLOE-39 her last dose was in March she took [employer] vaccine she denies any COVID-19 exposures. Since admission patient developed AFIB with RVR which has been treated with rate control and is now in sinus with PAC's. .  Cardiac risk factors: extant. Physical Exam    Visit Vitals  BP (!) 83/49   Pulse 80   Temp 98.3 °F (36.8 °C)   Resp 18   Ht 5' 4\" (1.626 m)   Wt 96.6 kg (213 lb)   SpO2 92%   Breastfeeding No   BMI 36.56 kg/m²       General Appearance:  Well developed, well nourished,alert and oriented x 3, and individual in no acute distress. Ears/Nose/Mouth/Throat:   Hearing grossly normal.         Neck: Supple. Chest:   Lungs clear to auscultation bilaterally. Cardiovascular:  Regular rate and rhythm, S1, S2 normal, no murmur. Abdomen:   Soft, non-tender, bowel sounds are active. Extremities: No edema bilaterally. Skin: Warm and dry. Cardiographics    Telemetry: normal sinus rhythm  ECG: no acute change  Echocardiogram: Abnormal, and reviewed by me. Reduced systolic LV function. Recent radiology, intake/output and wt reviewed    Labs:   Recent Results (from the past 48 hour(s))   GLUCOSE, POC    Collection Time: 08/17/21  4:17 PM   Result Value Ref Range    Glucose (POC) 101 70 - 110 mg/dL   GLUCOSE, POC    Collection Time: 08/17/21  9:11 PM   Result Value Ref Range    Glucose (POC) 97 70 - 047 mg/dL   METABOLIC PANEL, COMPREHENSIVE    Collection Time: 08/18/21  4:03 AM   Result Value Ref Range    Sodium 136 136 - 145 mmol/L    Potassium 3.3 (L) 3.5 - 5.5 mmol/L    Chloride 105 100 - 111 mmol/L    CO2 23 21 - 32 mmol/L    Anion gap 8 3.0 - 18 mmol/L    Glucose 119 (H) 74 - 99 mg/dL    BUN 7 7.0 - 18 MG/DL    Creatinine 0.55 (L) 0.6 - 1.3 MG/DL    BUN/Creatinine ratio 13 12 - 20      GFR est AA >60 >60 ml/min/1.73m2    GFR est non-AA >60 >60 ml/min/1.73m2    Calcium 8.3 (L) 8.5 - 10.1 MG/DL    Bilirubin, total 0.5 0.2 - 1.0 MG/DL    ALT (SGPT) 16 13 - 56 U/L    AST (SGOT) 14 10 - 38 U/L    Alk. phosphatase 75 45 - 117 U/L    Protein, total 5.6 (L) 6.4 - 8.2 g/dL    Albumin 2.4 (L) 3.4 - 5.0 g/dL    Globulin 3.2 2.0 - 4.0 g/dL    A-G Ratio 0.8 0.8 - 1.7     CBC WITH AUTOMATED DIFF    Collection Time: 08/18/21  4:03 AM   Result Value Ref Range    WBC 11.7 4.6 - 13.2 K/uL    RBC 3.29 (L) 4.20 - 5.30 M/uL    HGB 10.0 (L) 12.0 - 16.0 g/dL    HCT 29.8 (L) 35.0 - 45.0 %    MCV 90.6 74.0 - 97.0 FL    MCH 30.4 24.0 - 34.0 PG    MCHC 33.6 31.0 - 37.0 g/dL    RDW 15.1 (H) 11.6 - 14.5 %    PLATELET 912 (L) 037 - 420 K/uL    MPV 13.8 (H) 9.2 - 11.8 FL    NEUTROPHILS 85 (H) 40 - 73 %    BAND NEUTROPHILS 3 0 - 5 %    LYMPHOCYTES 10 (L) 21 - 52 %    MONOCYTES 2 (L) 3 - 10 %    EOSINOPHILS 0 0 - 5 %    BASOPHILS 0 0 - 2 %    ABS. NEUTROPHILS 10.3 (H) 1.8 - 8.0 K/UL    ABS.  LYMPHOCYTES 1.2 0.9 - 3.6 K/UL    ABS. MONOCYTES 0.2 0.05 - 1.2 K/UL    ABS. EOSINOPHILS 0.0 0.0 - 0.4 K/UL    ABS.  BASOPHILS 0.0 0.0 - 0.1 K/UL    DF MANUAL      PLATELET COMMENTS LARGE PLATELETS      RBC COMMENTS OVALOCYTES  1+       MAGNESIUM    Collection Time: 08/18/21  4:03 AM   Result Value Ref Range    Magnesium 1.9 1.6 - 2.6 mg/dL   TSH 3RD GENERATION    Collection Time: 08/18/21  4:03 AM   Result Value Ref Range    TSH 0.56 0.36 - 3.74 uIU/mL   GLUCOSE, POC    Collection Time: 08/18/21  6:49 AM   Result Value Ref Range    Glucose (POC) 147 (H) 70 - 110 mg/dL   GLUCOSE, POC    Collection Time: 08/18/21  7:57 AM   Result Value Ref Range    Glucose (POC) 151 (H) 70 - 110 mg/dL   EKG, 12 LEAD, INITIAL    Collection Time: 08/18/21 10:37 AM   Result Value Ref Range    Ventricular Rate 151 BPM    QRS Duration 86 ms    Q-T Interval 308 ms    QTC Calculation (Bezet) 488 ms    Calculated R Axis 80 degrees    Calculated T Axis 49 degrees    Diagnosis         Atrial fibrillation with rapid ventricular response with premature   ventricular or aberrantly conducted complexes  Nonspecific ST abnormality  Abnormal ECG  When compared with ECG of 16-AUG-2021 10:40,  Atrial fibrillation has replaced Sinus rhythm  Confirmed by Yasmani Simms MD. ([id number]) on 8/19/2021 12:03:19 AM     GLUCOSE, POC    Collection Time: 08/18/21 11:15 AM   Result Value Ref Range    Glucose (POC) 106 70 - 110 mg/dL   ECHO ADULT COMPLETE    Collection Time: 08/18/21 12:26 PM   Result Value Ref Range    IVSd 1.28 (A) 0.60 - 0.90 cm    LVIDd 4.69 3.90 - 5.30 cm    LVIDs 3.74 cm    LVOT d 2.11 cm    LVPWd 1.11 (A) 0.60 - 0.90 cm    BP EF 41.1 (A) 55.0 - 100.0 percent    LV Ejection Fraction MOD 2C 37 percent    LV Ejection Fraction MOD 4C 48 percent    LV ED Vol A2C 78.44 mL    LV ED Vol A4C 109.07 mL    LV ED Vol BP 93.64 56.0 - 104.0 mL    LV ES Vol A2C 49.23 mL    LV ES Vol A4C 57.25 mL    LV ES Vol BP 55.13 (A) 19.0 - 49.0 mL    LVOT SV 60.0 mL    LVOT Peak Gradient 3.62 mmHg    Left Ventricular Outflow Tract Mean Gradient 2.62 mmHg    LVOT Peak Velocity 95.07 cm/s    LVOT VTI 17.16 cm    RVIDd 3.86 cm    Left Atrium Major Axis 4.11 cm    LA Volume 55.73 22.0 - 52.0 mL    LA Vol 2C 46.54 22.00 - 52.00 mL    LA Vol 4C 56.28 (A) 22.00 - 52.00 mL    Right Atrial Area 4C 23.48 cm2    Aortic Valve Area by Continuity of Peak Velocity 2.78 cm2    Aortic Valve Area by Continuity of VTI 2.71 cm2    AoV PG 5.73 mmHg    Aortic Valve Systolic Mean Gradient 7.63 mmHg    Aortic Valve Systolic Peak Velocity 640.21 cm/s    AoV VTI 22.18 cm    Triscuspid Valve Regurgitation Peak Gradient 32.10 mmHg    TR Max Velocity 283.30 cm/s    Ao Root D 2.97 cm    IVC proximal 1.88 cm    LV E' Septal Velocity 16.00 cm/s    LV E' Lateral Velocity 18.00 cm/s    MV E Dillan 109.00 cm/s    LV Mass .0 67.0 - 162.0 g    LV Mass AL Index 104.5 43.0 - 95.0 g/m2    E/E' lateral 6.06     E/E' septal 6.81     E/E' ratio (averaged) 6.43     LVES Vol Index BP 27.4 mL/m2    LVED Vol Index BP 46.6 mL/m2    Est. RA Pressure 3.0 mmHg    Mitral Valve E Wave Deceleration Time 126.0 ms    Left Atrium Minor Axis 2.04 cm    Tapse 1.70 1.50 - 2.00 cm    LA Vol Index 27.73 16.00 - 28.00 ml/m2    LA Vol Index 23.15 16.00 - 28.00 ml/m2    LA Vol Index 28.00 16.00 - 28.00 ml/m2    LVED Vol Index A4C 54.3 mL/m2    LVED Vol Index A2C 39.0 mL/m2    LVES Vol Index A4C 28.5 mL/m2    LVES Vol Index A2C 24.5 mL/m2    ABIEL/BSA Pk Dillan 1.4 cm2/m2    ABIEL/BSA VTI 1.3 cm2/m2   D DIMER    Collection Time: 08/18/21  1:25 PM   Result Value Ref Range    D DIMER 2.89 (H) <0.46 ug/ml(FEU)   GLUCOSE, POC    Collection Time: 08/18/21  3:34 PM   Result Value Ref Range    Glucose (POC) 121 (H) 70 - 110 mg/dL   GLUCOSE, POC    Collection Time: 08/18/21  9:27 PM   Result Value Ref Range    Glucose (POC) 120 (H) 70 - 110 mg/dL   MAGNESIUM    Collection Time: 08/19/21  3:30 AM   Result Value Ref Range    Magnesium 1.8 1.6 - 2.6 mg/dL   CBC WITH AUTOMATED DIFF Collection Time: 08/19/21  3:30 AM   Result Value Ref Range    WBC 8.8 4.6 - 13.2 K/uL    RBC 3.61 (L) 4.20 - 5.30 M/uL    HGB 11.0 (L) 12.0 - 16.0 g/dL    HCT 32.3 (L) 35.0 - 45.0 %    MCV 89.5 74.0 - 97.0 FL    MCH 30.5 24.0 - 34.0 PG    MCHC 34.1 31.0 - 37.0 g/dL    RDW 14.9 (H) 11.6 - 14.5 %    PLATELET 207 343 - 054 K/uL    MPV 14.3 (H) 9.2 - 11.8 FL    NEUTROPHILS 72 40 - 73 %    LYMPHOCYTES 20 (L) 21 - 52 %    MONOCYTES 6 3 - 10 %    EOSINOPHILS 1 0 - 5 %    BASOPHILS 0 0 - 2 %    ABS. NEUTROPHILS 6.3 1.8 - 8.0 K/UL    ABS. LYMPHOCYTES 1.8 0.9 - 3.6 K/UL    ABS. MONOCYTES 0.5 0.05 - 1.2 K/UL    ABS. EOSINOPHILS 0.1 0.0 - 0.4 K/UL    ABS. BASOPHILS 0.0 0.0 - 0.1 K/UL    DF AUTOMATED     METABOLIC PANEL, COMPREHENSIVE    Collection Time: 08/19/21  3:30 AM   Result Value Ref Range    Sodium 138 136 - 145 mmol/L    Potassium 3.4 (L) 3.5 - 5.5 mmol/L    Chloride 104 100 - 111 mmol/L    CO2 29 21 - 32 mmol/L    Anion gap 5 3.0 - 18 mmol/L    Glucose 116 (H) 74 - 99 mg/dL    BUN 5 (L) 7.0 - 18 MG/DL    Creatinine 0.56 (L) 0.6 - 1.3 MG/DL    BUN/Creatinine ratio 9 (L) 12 - 20      GFR est AA >60 >60 ml/min/1.73m2    GFR est non-AA >60 >60 ml/min/1.73m2    Calcium 8.3 (L) 8.5 - 10.1 MG/DL    Bilirubin, total 0.4 0.2 - 1.0 MG/DL    ALT (SGPT) 25 13 - 56 U/L    AST (SGOT) 24 10 - 38 U/L    Alk.  phosphatase 78 45 - 117 U/L    Protein, total 5.9 (L) 6.4 - 8.2 g/dL    Albumin 2.4 (L) 3.4 - 5.0 g/dL    Globulin 3.5 2.0 - 4.0 g/dL    A-G Ratio 0.7 (L) 0.8 - 1.7     GLUCOSE, POC    Collection Time: 08/19/21  6:03 AM   Result Value Ref Range    Glucose (POC) 143 (H) 70 - 110 mg/dL   GLUCOSE, POC    Collection Time: 08/19/21 11:31 AM   Result Value Ref Range    Glucose (POC) 194 (H) 70 - 110 mg/dL           Grazyna Craft MD

## 2021-08-19 NOTE — PROGRESS NOTES
IV Digoxin given. Pt's HR now in the 100-110s. Report given to SPEEDY RAMOS [facility].

## 2021-08-19 NOTE — PROGRESS NOTES
Care Management    Transfer Acknowledgement: CM notes that the patient has transferred from [address] [place] [address] to [facility]. CM to follow the patient's progress and be available to assist as needed. Chart reviewed. CM spoke with the patient at the bedside and informed her of the CM's role. The patient confirmed demographics and PCP. CM and the patient discussed discharge plans to include transportation upon discharge, DME, family support, and transportation to and from appointments. The patient states that her boyfriend should be available to drive her home upon discharge, if not she may need to have a Lyft arranged. The patient denies any questions or concerns at this time. CM provided the patient with this CM's contact information and will continue to follow the patient's progress and be available to assist with discharge planning as needed. CMS referral placed. Care Management Interventions  Mode of Transport at Discharge:  Other (see comment) (Family/Friend)  Transition of Care Consult (CM Consult): Discharge Planning  Health Maintenance Reviewed: Yes  The Plan for Transition of Care is Related to the Following Treatment Goals : Home with physician follow up   Discharge Location  Discharge Placement: Home with family assistance

## 2021-08-19 NOTE — PROGRESS NOTES
Bedside and Verbal shift change report given to BRIA Montano Rn (oncoming nurse) by ELLEN Ojeda RN (offgoing nurse). Report included the following information SBAR, Kardex, Intake/Output, MAR and Recent Results.

## 2021-08-19 NOTE — PROGRESS NOTES
Hospitalist Progress Note    Patient: Jayy Gibson MRN: [id number]  CSN: [id number]    YOB: 1960  Age: 64 y.o.   Sex: female    DOA: 8/16/2021 LOS:  LOS: 3 days          Chief Complaint:    afib      Assessment/Plan     65 yo female came with sepsis, possible pyelo     Developed new afib with RVR symptomatic with palpitations  Echo came back with reduced EF 45%  Check for PE, she is already anticoagulated but it will be good for continued therapeutic purpose to evaluate     Sepsis   New onset afib-cardizem gtt, toprol BID, cardiology consult, received one dose dig yesterday afternoon, and again this am, has converted to SR, continue to  monitor on tele  Pyelonephritis   hypokalemia -PO Kdur  Possible early pneumonia  Fibromyalgia - Celexa  Neuropathy with chronic back pain- Lyrica/gabapentin  Diabetes diet controlled   Hypertension   Right hip pain Chronic       Blood/urine cx neg     Fever down  Wbc normalized     D dimer elevated  Check TSH level    CTA chest  Cardiac stress test  Appreciate cardiology help  Continue AC, change to eliquis for her d/c      Disposition :  Patient Active Problem List   Diagnosis Code    Pyelonephritis N12    Sepsis ([facility] A41.9    Hyponatremia E87.1    Hypokalemia E87.6    Leukocytosis D72.829    Benign essential hypertension I10    Calculus of kidney N20.0    Chronic bilateral low back pain with right-sided sciatica M54.41, G89.29    Cobalamin deficiency E53.8    Right hip pain M25.551    Diabetes type 2, 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    Gastroesophageal reflux disease K21.9    Peripheral polyneuropathy G62.9       Subjective:  I feel much better  I rested  Converted to SR after dig this am    No new issues  Denies CP, SOB      Review of systems:    Constitutional: denies fevers, chills  Respiratory: denies cough  Cardiovascular: denies  palpitations  Gastrointestinal: denies nausea, vomiting, diarrhea      Vital signs/Intake and Output:  Visit Vitals  BP (!) 107/92   Pulse (!) 160   Temp 98 °F (36.7 °C)   Resp 18   Ht 5' 4\" (1.626 m)   Wt 96.6 kg (213 lb)   SpO2 97%   Breastfeeding No   BMI 36.56 kg/m²     Current Shift:  No intake/output data recorded. Last three shifts:  08/17 1901 - 08/19 0700  In: 1809.6 [P.O.:600; I.V.:1209.6]  Out: 4400 [Urine:4400]    Exam:    General: Well developed, alert, NAD, OX3  CVS:Regular rate and rhythm, no M/R/G, S1/S2 heard, no thrill  Lungs:Clear to auscultation bilaterally, no wheezes, rhonchi, or rales  Abdomen: Soft, Nontender, No distention, Normal Bowel sounds, No hepatomegaly  Extremities: No C/C/E, pulses palpable 2+  Neuro:grossly normal , follows commands  Psych:appropriate                Labs: Results:       Chemistry Recent Labs     08/19/21 0330 08/18/21 0403 08/17/21  0455   * 119* 124*    136 137   K 3.4* 3.3* 3.0*    105 105   CO2 29 23 27   BUN 5* 7 9   CREA 0.56* 0.55* 0.50*   CA 8.3* 8.3* 7.8*   AGAP 5 8 5   BUCR 9* 13 18   AP 78 75 72   TP 5.9* 5.6* 5.7*   ALB 2.4* 2.4* 2.5*   GLOB 3.5 3.2 3.2   AGRAT 0.7* 0.8 0.8      CBC w/Diff Recent Labs     08/19/21 0330 08/18/21 0403 08/17/21  0455   WBC 8.8 11.7 14.0*   RBC 3.61* 3.29* 3.33*   HGB 11.0* 10.0* 10.4*   HCT 32.3* 29.8* 31.1*    116* 111*   GRANS 72 85* 79*   LYMPH 20* 10* 9*   EOS 1 0 0      Cardiac Enzymes No results for input(s): CPK, CKND1, SALONI in the last 72 hours. No lab exists for component: CKRMB, TROIP   Coagulation No results for input(s): PTP, INR, APTT, INREXT in the last 72 hours. Lipid Panel No results found for: CHOL, CHOLPOCT, CHOLX, CHLST, CHOLV, 979826, HDL, HDLP, LDL, LDLC, DLDLP, 407086, VLDLC, VLDL, TGLX, TRIGL, TRIGP, TGLPOCT, CHHD, CHHDX   BNP No results for input(s): BNPP in the last 72 hours.    Liver Enzymes Recent Labs     08/19/21  0330   TP 5.9*   ALB 2.4*   AP 78      Thyroid Studies Lab Results   Component Value Date/Time    TSH 0.56 08/18/2021 04:03 AM        Procedures/imaging: see electronic medical records for all procedures/Xrays and details which were not copied into this note but were reviewed prior to creation of Sabina Sanchez MD

## 2021-08-19 NOTE — PROGRESS NOTES
Dr. Siddhartha Lemon made aware of pt's HR. Received T.O. for ONETIME IV Digoxin 250 mcg, read back and verified. Order placed by this RN. Will continue to monitor.

## 2021-08-19 NOTE — PROGRESS NOTES
Shift Summary:  Patient with variable HR throughout shift, as low as 112 and as high as 160s. Cardizem drip at 10 mg per hour throughout shift. 500 ml NS bolus given as ordered by Dr. Mariam Rios. Oncoming RN aware. Will follow up post bolus.

## 2021-08-19 NOTE — PROGRESS NOTES
1350 : assumed care of patient from [address]  1400 : assessment completed, BP 99/51, cardizem drip held. 1620 : BP 80/41, map of 54. Paged Dr. Belkis Esquivel. 1638 : Dr. Belkis Esquivel called back, aware of medications. Stated BP should come back up. Okay to send to CT if SPB over 90.   1925 : Bedside shift change report given to Gustavo Hodges (oncoming nurse) by Saranya Varela RN (offgoing nurse). Report included the following information SBAR, Kardex, Intake/Output and MAR.

## 2021-08-19 NOTE — CONSULTS
[facility] Consult Note      Patient: Isaiah Rivera MRN: [id number]  SSN: [id number]    YOB: 1960  Age: 64 y.o. Sex: female          Date of Consultation: 8/19/2021 at10:30 a.m. Referring Physician: Dr. Juventino Christine   Reason for Consultation: afib    HPI:  I was asked by  to see this pleasant patient for atrial fibrillation. Isaiah Rivera is a 80-year-old pleasant patient admitted in the hospital with fever and pyelonephritis and possible early pneumonia and found to have atrial fibrillation with RVR with symptoms of palpitation and also sometime left precordial pain. Patient have history of palpitations in the past without diagnosis of atrial fibrillation or any other arrhythmia. Patient denied any history of active bleeding or receiving any blood transfusion in the past.  Patient used to drink excessive amount of alcohol which she has cut down and she has history of fall more than 5 years ago. Previous history of pre diabetes. No history of DVT, pulmonary embolism. Patient denied any history of TIA or stroke. Past Medical History:   Diagnosis Date    Back pain     Fibromyalgia     Neuropathy      No past surgical history on file.   Current Facility-Administered Medications   Medication Dose Route Frequency    sodium chloride (NS) flush 5-40 mL  5-40 mL IntraVENous Q8H    sodium chloride (NS) flush 5-40 mL  5-40 mL IntraVENous PRN    LORazepam (ATIVAN) tablet 1 mg  1 mg Oral Q1H PRN    Or    LORazepam (ATIVAN) injection 1 mg  1 mg IntraVENous Q1H PRN    LORazepam (ATIVAN) tablet 2 mg  2 mg Oral Q1H PRN    Or    LORazepam (ATIVAN) injection 2 mg  2 mg IntraVENous Q1H PRN    LORazepam (ATIVAN) injection 3 mg  3 mg IntraVENous Q15MIN PRN    metoprolol tartrate (LOPRESSOR) tablet 25 mg  25 mg Oral Q12H    enoxaparin (LOVENOX) injection 100 mg  1 mg/kg SubCUTAneous DAILY    dilTIAZem (CARDIZEM) 125 mg in dextrose 5% 125 mL infusion  10 mg/hr IntraVENous CONTINUOUS    citalopram (CELEXA) tablet 10 mg  10 mg Oral DAILY    gabapentin (NEURONTIN) capsule 200 mg  200 mg Oral TID    losartan (COZAAR) tablet 50 mg  50 mg Oral DAILY    sodium chloride (NS) flush 5-10 mL  5-10 mL IntraVENous PRN    cefepime (MAXIPIME) 2 g in sterile water (preservative free) 10 mL IV syringe  2 g IntraVENous Q8H    levoFLOXacin (LEVAQUIN) 750 mg in D5W IVPB  750 mg IntraVENous Q24H    albuterol (PROVENTIL HFA, VENTOLIN HFA, PROAIR HFA) inhaler 2 Puff  2 Puff Inhalation Q4H PRN    sodium chloride (NS) flush 5-40 mL  5-40 mL IntraVENous Q8H    sodium chloride (NS) flush 5-40 mL  5-40 mL IntraVENous PRN    acetaminophen (TYLENOL) tablet 650 mg  650 mg Oral Q6H PRN    Or    acetaminophen (TYLENOL) suppository 650 mg  650 mg Rectal Q6H PRN    polyethylene glycol (MIRALAX) packet 17 g  17 g Oral DAILY PRN    ondansetron (ZOFRAN ODT) tablet 4 mg  4 mg Oral Q8H PRN    Or    ondansetron (ZOFRAN) injection 4 mg  4 mg IntraVENous Q6H PRN    pantoprazole (PROTONIX) injection 40 mg  40 mg IntraVENous DAILY    insulin lispro (HUMALOG) injection   SubCUTAneous AC&HS    glucose chewable tablet 16 g  4 Tablet Oral PRN    glucagon (GLUCAGEN) injection 1 mg  1 mg IntraMUSCular PRN    dextrose (D50W) injection syrg 12.5-25 g  25-50 mL IntraVENous PRN    oxyCODONE IR (ROXICODONE) tablet 5 mg  5 mg Oral Q4H PRN       Allergies and Intolerances: Allergies   Allergen Reactions    Sulfa (Sulfonamide Antibiotics) Hives       Family History:   No family history on file. Social History:   She  has no history on file for tobacco use. She  has no history on file for alcohol use. Review of Systems  Gen: No fever, chills, malaise, weight loss/gain. Heent: No headache, rhinorrhea, epistaxis, ear pain, hearing loss, sinus pain, neck pain/stiffness, sore throat. Heart: + chest pain, +palpitations, GUTIERREZ, pnd, or orthopnea. Resp: No cough, hemoptysis, wheezing and +shortness of breath.    GI: No nausea, vomiting, diarrhea, constipation, melena or hematochezia. : No urinary obstruction, dysuria or hematuria. Derm: No rash, new skin lesion or pruritis. Musc/skeletal: no bone or joint complains. Vasc: No edema, cyanosis or claudication. Endo: No heat/cold intolerance, no polyuria,polydipsia or polyphagia. Neuro: No unilateral weakness, numbness, tingling. No seizures. Heme: No easy bruising or bleeding. Physical:   Patient Vitals for the past 6 hrs:   Temp Pulse Resp BP SpO2   08/19/21 1133 98.3 °F (36.8 °C) 80 18 (!) 83/49 92 %   08/19/21 0800 -- (!) 150 -- -- --   08/19/21 0756 98.2 °F (36.8 °C) (!) 154 18 119/82 94 %         Exam:   General Appearance: Comfortable, not using accessory muscles of respiration. HEENT: SIOBHAN. HEAD: Atraumatic  NECK: No JVD, no thyroidomeglay. LUNGS: Clear bilaterally. HEART: S1 irregualr+S2     ABD: Non-tender, BS Audible    EXT: No edema, and no cysnosis. VASCULAR EXAM: Pulses are intact. PSYCHIATRIC EXAM: Mood is appropriate. MUSCULOSKELETAL: Grossly no joint deformity. NEUROLOGICAL: Motor and sensory sytem intact and Cranial nerves II-XII intact.     Review of Data:   LABS:   Lab Results   Component Value Date/Time    WBC 8.8 08/19/2021 03:30 AM    HGB 11.0 (L) 08/19/2021 03:30 AM    HCT 32.3 (L) 08/19/2021 03:30 AM    PLATELET 206 04/89/5227 03:30 AM     Lab Results   Component Value Date/Time    Sodium 138 08/19/2021 03:30 AM    Potassium 3.4 (L) 08/19/2021 03:30 AM    Chloride 104 08/19/2021 03:30 AM    CO2 29 08/19/2021 03:30 AM    Glucose 116 (H) 08/19/2021 03:30 AM    BUN 5 (L) 08/19/2021 03:30 AM    Creatinine 0.56 (L) 08/19/2021 03:30 AM     No results found for: CHOL, CHOLX, CHLST, CHOLV, HDL, HDLP, LDL, LDLC, DLDLP, TGLX, TRIGL, TRIGP  No components found for: GPT  Lab Results   Component Value Date/Time    Hemoglobin A1c 5.6 08/16/2021 10:45 AM       RADIOLOGY:  CT Results  (Last 48 hours)    None        CXR Results  (Last 48 hours)    None Cardiology Procedures:   Results for orders placed or performed during the hospital encounter of 08/16/21   EKG, 12 LEAD, INITIAL   Result Value Ref Range    Ventricular Rate 151 BPM    QRS Duration 86 ms    Q-T Interval 308 ms    QTC Calculation (Bezet) 488 ms    Calculated R Axis 80 degrees    Calculated T Axis 49 degrees    Diagnosis         Atrial fibrillation with rapid ventricular response with premature   ventricular or aberrantly conducted complexes  Nonspecific ST abnormality  Abnormal ECG  When compared with ECG of 16-AUG-2021 10:40,  Atrial fibrillation has replaced Sinus rhythm  Confirmed by Ngoc Pretty MD. 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 Plan:    Nurse called me in the morning today for AFib with RVR and low normal blood pressure. I gave a dose of digoxin 0.25 mg IV does  Now at the time of consultation patient is in sinus rhythm with occasional PACs. Hold losartan for low  blood pressure   I will titrate metoprolol / Cardizem. Patient have symptomatic episodes of atrial fibrillation with palpitation and some chest pain. I have long lengthy discussion with the patient about management plan.     I will get stress test with symptoms of chest pain and cardiomyopathy & risk factor for CAD. Her [id number] score is 3 (HTN, DM, female), she is a candidate for long-term anticoagulation  Thank you for allowing me to participate in management of this patient.       Signed By: Deepthi Perdue MD     August 19, 2021

## 2021-08-19 NOTE — PROGRESS NOTES
Pt still in Afib RVR with HR in the 140-150s. Pt asymptomatic. Medicated as ordered. [email or web address]. 500cc bolus still running. Dr. Landaverde Resides paged to be made aware. Awaiting callback.

## 2021-08-19 NOTE — ADT AUTH CERT NOTES
Urinary Tract Infection (UTI) - Care Day 3 (8/18/2021) by Felix Walker RN       Review Status Review Entered   Completed 8/19/2021 14:21      Criteria Review      Care Day: 3 Care Date: 8/18/2021 Level of Care: Telemetry    Guideline Day 2    Level Of Care    ( ) Floor    8/19/2021 14:21:30 EDT by Jana Jimenez tx to tele    65 yo female came with sepsis, possible pyelo     Developed new afib with RVR this am symptomatic with palpitations    Clinical Status    ( ) * Hypotension absent    8/19/2021 14:21:30 EDT by Ernie Contreras      P 93, 148, 155, 158, 152, 112  /66, 109/75, 146/66  R 16, 20  90%, 95% RA    (X) * Mental status at baseline    8/19/2021 14:21:30 EDT by Tiago Rios: Well developed, alert, NAD, OX3    ( ) * Afebrile or fever improved    8/19/2021 14:21:30 EDT by Jana Jimenez 100, 99.4, 98.5, 101.7    (X) * Vomiting absent or improved    8/19/2021 14:21:30 EDT by Jana Jimenez RN: she feels a lot better and no longer that nauseous.  Pt was able to finish her popsicles and has been drinking water without any issues  Gastrointestinal: denies nausea, vomiting, diarrhea    Activity    (X) * Ambulatory    Routes    (X) IV fluids    8/19/2021 14:21:30 EDT by Ernie Contreras      NS IV @ 150 ml/h    (X) IV medications    8/19/2021 14:21:30 EDT by Ernie Contreras      Cardizem IV ggt @ 5 mg/h  Digoxin 250 mcg IV now  Lorazepam 1 mg IV q1h prn x 1  Protonix 40 mg IV daily  Reglan 5 mg IV q6h    losartan 50 mg po daily  metoprolol 25 mg po q12h  KLOR 40 mEq po now    (X) Advance diet as tolerated    Interventions    (X) * Reversible urinary system abnormality (eg, obstruction, abscess) absent, addressed, or to be treated at next level of care    (X) WBC    8/19/2021 14:21:30 EDT by Ernie Contreras      WBC 11.7  Hgb 10.0    D Dimer 2.89K 3.3    CRea 0.55    (X) Renal function tests    Medications    (X) Antibiotics    8/19/2021 14:21:30 EDT by Juan C Triplett, Alesia      Cefepime 2 g IV q8h  Levaquin 750 mg IV q24h    * Milestone   Additional Notes   Hospitalist Progress Note       Assessment/Plan   63 yo female came with sepsis, possible pyelo       Developed new afib with RVR this am symptomatic with palpitations       Sepsis    New onset afib   Pyelonephritis    Hyponatremia hypokalemia -PO Kdur   Possible early pneumonia   Fibromyalgia - Celexa   Neuropathy with chronic back pain- Lyrica/gabapentin   Diabetes diet controlled    Hypertension    Right hip pain Chronic       UDS THC and opiate positive       Blood/urine cx neg       Fever down   Wbc normalized       Echo ordred   Check TSH level   Place on continuous cardiac montioring   cardizem gtt if PO lopressor does not bring HR down   Increase lovenox to therapeutic dose for thromboembolic prevention   Gentle fluids can be stopped   Ativan as needed for anxiety, also verified and resumed her home meds-neurontin, cozaar, celexa      Subjective:   Palpitation this am   ekg ordred   Confirmed afib with RVR   Patient had been feeling better overall though she states   Some low back pain from the bed   But no fevers, hs eaten, denies cough or SOB   No dysuria           Review of systems:       Constitutional: denies fevers, chills   Respiratory: denies SOB   Cardiovascular: denies chest pain   Gastrointestinal: denies nausea, vomiting, diarrhea      General: Well developed, alert, NAD, OX3   CVS:mild tachy, no MRG   Lungs:Clear to auscultation bilaterally, no wheezes, rhonchi, or rales   Abdomen: Soft, Nontender, No distention, Normal Bowel sounds, No hepatomegaly   Extremities: No C/C/E, pulses palpable 2+   Neuro:grossly normal , follows commands   Psych:appropriate            ECHO ADULT COMPLETE    LV: Estimated LVEF is 40 - 45%. Normal cavity size. Increased wall thickness. E'E= 6.43.  Cannot exclude wall motion abnormalities due to tachycardia and patient's heart rate was 150 bpm during echocardiogram.   TV: Mild tricuspid valve regurgitation is present. PA: Pulmonary arterial systolic pressure is 35 mmHg.          EKG   Ventricular Rate 151 BPM Final QRS Duration 86 ms Final    Atrial fibrillation with rapid ventricular response with premature   ventricular or aberrantly conducted complexes   Nonspecific ST abnormality   Abnormal ECG   When compared with ECG of 16-AUG-2021 10:40,   Atrial fibrillation has replaced Sinus rhythm          CULTURE, URINE (Order [id number])   8/17/2021   Special Requests:    Final NO SPECIAL REQUESTS Culture result:    Final No growth (<1,000 CFU/ML)       8/18/2021  7:38 AM    CULTURE, BLOOD    Culture result: NO GROWTH 2 DAYS    Preliminary                Urinary Tract Infection (UTI) - Care Day 2 (8/17/2021) by Marylu Abarca RN       Review Status Review Entered   Completed 8/19/2021 14:11      Criteria Review      Care Day: 2 Care Date: 8/17/2021 Level of Care: Inpatient Floor    Guideline Day 2    Level Of Care    (X) Floor    Clinical Status    ( ) * Hypotension absent    8/19/2021 14:11:57 EDT by Luci Moscoso      P 83, 90  BP  107/44 MAP 65, 76/62 MAP 67, 111/49  R 16  95% 100% RA    ( ) * Mental status at baseline    8/19/2021 14:11:57 EDT by Luci Moscoso      General: Well developed, alert, NAD, OX3  Neuro:grossly normal , follows commands  Psych:appropriate    ( ) * Afebrile or fever improved    8/19/2021 14:11:57 EDT by Familia Pickett 99.4, 101.7, 98/8    ( ) * Vomiting absent or improved    8/19/2021 14:11:57 EDT by Luci Moscoso      Gastrointestinal:+nausea, +vomiting, diarrhea  Feeling better less back pain last Temp at 4 am  Starting to feel hungry    Zofran 4 mg IV q6h prn x 2    Activity    (X) * Ambulatory    8/19/2021 14:11:57 EDT by Luci Moscoso      activity as [place] w/assist    Routes    (X) IV fluids    8/19/2021 14:11:57 EDT by Luci Moscoso      NS  ml/h    (X) IV medications    8/19/2021 14:11:57 EDT by Luci Moscoso      Protonix 40 mg IV daily  Reglan 5 mg IV q6h    (X) Advance diet as tolerated    8/19/2021 14:11:57 EDT by Rhona Goodpasture full liquid to regular diet    Interventions    (X) * Reversible urinary system abnormality (eg, obstruction, abscess) absent, addressed, or to be treated at next level of care    8/19/2021 14:11:57 EDT by Bere Pop      voiding    (X) WBC    8/19/2021 14:11:57 EDT by Bere Pop      WBC 14.0  Hgb 10.4  K 3.0    Crea 0.50    (X) Renal function tests    Medications    (X) Antibiotics    8/19/2021 14:11:57 EDT by Bere Pop      Levaquin 750 mg IV q24h  Cefepime 2 g IV q8h    * Milestone   Additional Notes   Hospitalist Progress Note      Feeling better less back pain last Temp at 4 am   Starting to feel hungry       Assessment/Plan   Sepsis she started on broad-spectrum antibiotics in the form of cefepime and Levaquin   2.  Pyelonephritis urine cultures obtained aggressive fluids antiemetics started   3.  Hyponatremia hypokalemia diuretics discontinued given fluids and electrolyte replacement protocol   4.  Possible early pneumonia urine streptococcal antigen and pneumococcal antigens obtained she should be covered with being on Levaquin for this   5.  Fibromyalgia she will be continued on her Celexa   6.  Neuropathy with chronic back pain she is continued on her Lyrica/gabapentin   DVT/GI Prophylaxis: Lovenox       7.  Diabetes diet controlled placed on sliding scale insulin aggressive fluids for now because of is to be placed on a full liquid diet which can be advanced   8.  Hypertension she is on diuretic which will be held as well as holding home losartan restart once blood pressure is elevated as she is low normal now   . 9.  Right hip pain  Chronic   UDS THC and opiate positiv      Constitutional: denies fevers, chills, myalgias   Respiratory: denies SOB, cough   Cardiovascular: denies chest pain, palpitations   Gastrointestinal:+nausea, +vomiting, diarrhea

## 2021-08-20 ENCOUNTER — APPOINTMENT (OUTPATIENT)
Dept: NON INVASIVE DIAGNOSTICS | Age: 61
DRG: 872 | End: 2021-08-20
Attending: INTERNAL MEDICINE
Payer: MEDICARE

## 2021-08-20 ENCOUNTER — APPOINTMENT (OUTPATIENT)
Dept: NUCLEAR MEDICINE | Age: 61
DRG: 872 | End: 2021-08-20
Attending: INTERNAL MEDICINE
Payer: MEDICARE

## 2021-08-20 PROBLEM — I48.91 NEW ONSET A-FIB (HCC): Status: ACTIVE | Noted: 2021-08-20

## 2021-08-20 LAB
ALBUMIN SERPL-MCNC: 2.2 G/DL (ref 3.4–5)
ALBUMIN/GLOB SERPL: 0.7 {RATIO} (ref 0.8–1.7)
ALP SERPL-CCNC: 67 U/L (ref 45–117)
ALT SERPL-CCNC: 38 U/L (ref 13–56)
ANION GAP SERPL CALC-SCNC: 7 MMOL/L (ref 3–18)
AST SERPL-CCNC: 31 U/L (ref 10–38)
BASOPHILS # BLD: 0 K/UL (ref 0–0.1)
BASOPHILS NFR BLD: 0 % (ref 0–2)
BILIRUB SERPL-MCNC: 0.2 MG/DL (ref 0.2–1)
BUN SERPL-MCNC: 7 MG/DL (ref 7–18)
BUN/CREAT SERPL: 14 (ref 12–20)
CALCIUM SERPL-MCNC: 8.3 MG/DL (ref 8.5–10.1)
CHLORIDE SERPL-SCNC: 106 MMOL/L (ref 100–111)
CO2 SERPL-SCNC: 27 MMOL/L (ref 21–32)
CREAT SERPL-MCNC: 0.49 MG/DL (ref 0.6–1.3)
DIFFERENTIAL METHOD BLD: ABNORMAL
EOSINOPHIL # BLD: 0.3 K/UL (ref 0–0.4)
EOSINOPHIL NFR BLD: 5 % (ref 0–5)
ERYTHROCYTE [DISTWIDTH] IN BLOOD BY AUTOMATED COUNT: 15.2 % (ref 11.6–14.5)
GLOBULIN SER CALC-MCNC: 3.3 G/DL (ref 2–4)
GLUCOSE BLD STRIP.AUTO-MCNC: 107 MG/DL (ref 70–110)
GLUCOSE BLD STRIP.AUTO-MCNC: 113 MG/DL (ref 70–110)
GLUCOSE BLD STRIP.AUTO-MCNC: 115 MG/DL (ref 70–110)
GLUCOSE BLD STRIP.AUTO-MCNC: 143 MG/DL (ref 70–110)
GLUCOSE SERPL-MCNC: 98 MG/DL (ref 74–99)
HCT VFR BLD AUTO: 30.6 % (ref 35–45)
HGB BLD-MCNC: 10.3 G/DL (ref 12–16)
LYMPHOCYTES # BLD: 2.3 K/UL (ref 0.9–3.6)
LYMPHOCYTES NFR BLD: 33 % (ref 21–52)
MAGNESIUM SERPL-MCNC: 1.9 MG/DL (ref 1.6–2.6)
MCH RBC QN AUTO: 30.7 PG (ref 24–34)
MCHC RBC AUTO-ENTMCNC: 33.7 G/DL (ref 31–37)
MCV RBC AUTO: 91.1 FL (ref 74–97)
MONOCYTES # BLD: 0.6 K/UL (ref 0.05–1.2)
MONOCYTES NFR BLD: 9 % (ref 3–10)
NEUTS SEG # BLD: 3.5 K/UL (ref 1.8–8)
NEUTS SEG NFR BLD: 52 % (ref 40–73)
PLATELET # BLD AUTO: 155 K/UL (ref 135–420)
POTASSIUM SERPL-SCNC: 3.7 MMOL/L (ref 3.5–5.5)
PROT SERPL-MCNC: 5.5 G/DL (ref 6.4–8.2)
RBC # BLD AUTO: 3.36 M/UL (ref 4.2–5.3)
SODIUM SERPL-SCNC: 140 MMOL/L (ref 136–145)
STRESS BASELINE HR: 68 BPM
STRESS ESTIMATED WORKLOAD: 1 METS
STRESS EXERCISE DUR MIN: NORMAL
STRESS PEAK DIAS BP: 65 MMHG
STRESS PEAK SYS BP: 121 MMHG
STRESS PERCENT HR ACHIEVED: 64 %
STRESS POST PEAK HR: 102 BPM
STRESS RATE PRESSURE PRODUCT: NORMAL BPM*MMHG
STRESS ST DEPRESSION: 0 MM
STRESS ST ELEVATION: 0 MM
STRESS TARGET HR: 159 BPM
WBC # BLD AUTO: 6.7 K/UL (ref 4.6–13.2)

## 2021-08-20 PROCEDURE — 74011250636 HC RX REV CODE- 250/636: Performed by: EMERGENCY MEDICINE

## 2021-08-20 PROCEDURE — 85025 COMPLETE CBC W/AUTO DIFF WBC: CPT

## 2021-08-20 PROCEDURE — 74011250636 HC RX REV CODE- 250/636

## 2021-08-20 PROCEDURE — 36415 COLL VENOUS BLD VENIPUNCTURE: CPT

## 2021-08-20 PROCEDURE — 74011250637 HC RX REV CODE- 250/637: Performed by: HOSPITALIST

## 2021-08-20 PROCEDURE — 93017 CV STRESS TEST TRACING ONLY: CPT

## 2021-08-20 PROCEDURE — 74011250637 HC RX REV CODE- 250/637: Performed by: INTERNAL MEDICINE

## 2021-08-20 PROCEDURE — 82962 GLUCOSE BLOOD TEST: CPT

## 2021-08-20 PROCEDURE — 74011000250 HC RX REV CODE- 250: Performed by: EMERGENCY MEDICINE

## 2021-08-20 PROCEDURE — 65660000000 HC RM CCU STEPDOWN

## 2021-08-20 PROCEDURE — 83735 ASSAY OF MAGNESIUM: CPT

## 2021-08-20 PROCEDURE — 80053 COMPREHEN METABOLIC PANEL: CPT

## 2021-08-20 RX ADMIN — GABAPENTIN 200 MG: 100 CAPSULE ORAL at 12:04

## 2021-08-20 RX ADMIN — CEFEPIME HYDROCHLORIDE 2 G: 2 INJECTION, POWDER, FOR SOLUTION INTRAVENOUS at 05:38

## 2021-08-20 RX ADMIN — Medication 10 ML: at 14:00

## 2021-08-20 RX ADMIN — Medication 10 ML: at 06:39

## 2021-08-20 RX ADMIN — REGADENOSON 0.4 MG: 0.08 INJECTION, SOLUTION INTRAVENOUS at 10:57

## 2021-08-20 RX ADMIN — ACETAMINOPHEN 650 MG: 325 TABLET ORAL at 22:05

## 2021-08-20 RX ADMIN — CITALOPRAM HYDROBROMIDE 10 MG: 10 TABLET ORAL at 12:04

## 2021-08-20 RX ADMIN — GABAPENTIN 200 MG: 100 CAPSULE ORAL at 22:05

## 2021-08-20 RX ADMIN — APIXABAN 5 MG: 5 TABLET, FILM COATED ORAL at 22:05

## 2021-08-20 RX ADMIN — Medication 10 ML: at 22:07

## 2021-08-20 RX ADMIN — LEVOFLOXACIN 750 MG: 5 INJECTION, SOLUTION INTRAVENOUS at 12:02

## 2021-08-20 RX ADMIN — PANTOPRAZOLE SODIUM 40 MG: 40 TABLET, DELAYED RELEASE ORAL at 06:38

## 2021-08-20 RX ADMIN — METOPROLOL TARTRATE 25 MG: 25 TABLET, FILM COATED ORAL at 12:04

## 2021-08-20 NOTE — PROGRESS NOTES
Cardiology Progress Note        Patient: Lolly Capps        Sex: female          DOA: 8/16/2021  YOB: 1960      Age:  64 y.o.        LOS:  LOS: 4 days   Assessment/Plan     Principal Problem:    Pyelonephritis (8/16/2021)    Active Problems:    Sepsis ([address] (8/16/2021)      Hyponatremia (8/16/2021)      Hypokalemia (8/16/2021)      Leukocytosis (8/16/2021)      New onset a-fib ([address] (8/20/2021)        Plan:    Patient is maintaining normal sinus rhythm  Atrial fibrillation converted to sinus rhythm  Cardiomyopathy improved from 45% to 50% on stress test     Stress test overall negative for ischemia with some technical limitation  EF is 55%  Patient has no chest pain since she is in normal rhythm. I have discussed with the patient the result of stress test.  I have discussed in detail with the patient about therapeutic anticoagulation, risk, benefits and alternatives she agreed to start Eliquis. Stop Lovenox  Start Eliquis 5 mg twice daily   continue with metoprolol tartrate 25 mg twice daily  Cardiology will follow as needed basis  I will sign off, please call if you have any questions or concerns. Subjective:    cc:  Atrial fibrillation  Cardiomyopathy        REVIEW OF SYSTEMS:     General: No fevers or chills. Cardiovascular: No chest pain or pressure. No palpitations. No ankle swelling  Pulmonary: No SOB, orthopnea, PND  Gastrointestinal: No nausea, vomiting or diarrhea      Objective:      Visit Vitals  /64 (BP 1 Location: Right upper arm, BP Patient Position: At rest)   Pulse 75   Temp 98.6 °F (37 °C)   Resp 18   Ht 5' 4\" (1.626 m)   Wt 97.5 kg (214 lb 15.2 oz)   SpO2 96%   Breastfeeding No   BMI 36.90 kg/m²     Body mass index is 36.9 kg/m². Physical Exam:  General Appearance: Comfortable, not using accessory muscles of respiration. NECK: No JVD, no thyroidomeglay. LUNGS: Clear bilaterally.    HEART: S1+S2 audible,    ABD: Non-tender, BS Audible    EXT: No edema, and no cysnosis. VASCULAR EXAM: Pulses are intact. PSYCHIATRIC EXAM: Mood is appropriate. Medication:  Current Facility-Administered Medications   Medication Dose Route Frequency    apixaban (ELIQUIS) tablet 5 mg  5 mg Oral BID    pantoprazole (PROTONIX) tablet 40 mg  40 mg Oral ACB    sodium chloride (NS) flush 5-40 mL  5-40 mL IntraVENous Q8H    sodium chloride (NS) flush 5-40 mL  5-40 mL IntraVENous PRN    metoprolol tartrate (LOPRESSOR) tablet 25 mg  25 mg Oral Q12H    citalopram (CELEXA) tablet 10 mg  10 mg Oral DAILY    gabapentin (NEURONTIN) capsule 200 mg  200 mg Oral TID    sodium chloride (NS) flush 5-10 mL  5-10 mL IntraVENous PRN    levoFLOXacin (LEVAQUIN) 750 mg in D5W IVPB  750 mg IntraVENous Q24H    albuterol (PROVENTIL HFA, VENTOLIN HFA, PROAIR HFA) inhaler 2 Puff  2 Puff Inhalation Q4H PRN    acetaminophen (TYLENOL) tablet 650 mg  650 mg Oral Q6H PRN    Or    acetaminophen (TYLENOL) suppository 650 mg  650 mg Rectal Q6H PRN    polyethylene glycol (MIRALAX) packet 17 g  17 g Oral DAILY PRN    ondansetron (ZOFRAN ODT) tablet 4 mg  4 mg Oral Q8H PRN    Or    ondansetron (ZOFRAN) injection 4 mg  4 mg IntraVENous Q6H PRN    insulin lispro (HUMALOG) injection   SubCUTAneous AC&HS    glucose chewable tablet 16 g  4 Tablet Oral PRN    glucagon (GLUCAGEN) injection 1 mg  1 mg IntraMUSCular PRN    dextrose (D50W) injection syrg 12.5-25 g  25-50 mL IntraVENous PRN    oxyCODONE IR (ROXICODONE) tablet 5 mg  5 mg Oral Q4H PRN               Lab/Data Reviewed:  Procedures/imaging: see electronic medical records for all procedures/Xrays   and details which were not copied into this note but were reviewed prior to creation of Plan       All lab results for the last 24 hours reviewed.      Recent Labs     08/20/21  0415 08/19/21  0330 08/18/21  0403   WBC 6.7 8.8 11.7   HGB 10.3* 11.0* 10.0*   HCT 30.6* 32.3* 29.8*    138 116*     Recent Labs 08/20/21  0415 08/19/21  0330 08/18/21  0403    138 136   K 3.7 3.4* 3.3*    104 105   CO2 27 29 23   GLU 98 116* 119*   BUN 7 5* 7   CREA 0.49* 0.56* 0.55*   CA 8.3* 8.3* 8.3*       RADIOLOGY:  CT Results  (Last 48 hours)               08/19/21 1741  CTA CHEST W OR W WO CONT Final result    Impression:      No evidence of a pulmonary embolism or aortic dissection. Small hiatal hernia. Moderate emphysema. Narrative:  EXAM: CTA chest       INDICATION: Evaluate for PE       COMPARISON: August 16, 2021       TECHNIQUE: Axial CT imaging from the thoracic inlet through the diaphragm with   intravenous contrast. Coronal and sagittal MIP reformats were generated. One or   more dose reduction techniques were used on this CT: automated exposure control,   adjustment of the mAs and/or kVp according to patient size, and iterative   reconstruction techniques. The specific techniques used on this CT exam have   been documented in the patient's electronic medical record. Digital Imaging and   Communications in Medicine (DICOM) format image data are available to   nonaffiliated external healthcare facilities or entities on a secure, media   free, reciprocally searchable basis with patient authorization for at least a   12-month period after this study. _______________       FINDINGS:       EXAM QUALITY: Overall exam quality is satisfactory. Pulmonary arterial   enhancement is adequate with adequate breath hold and no significant artifact. PULMONARY ARTERIES: No evidence of pulmonary embolism. LYMPH Nodes: No enlarged lymph nodes seen. PLEURA: Minimal bilateral effusions are seen. HEART: Cardiomegaly. There is no pericardial effusion. Mild calcific coronary   artery disease present. VASCULATURE/MEDIASTINUM: Small hiatal hernia present. There is no aortic   dissection. LUNGS: No suspicious nodule or mass. No abnormal opacities.  There is moderate emphysema. AIRWAY: Normal.       UPPER ABDOMEN: Visualized liver, gallbladder, adrenals, spleen, and pancreas   unremarkable. There is scarring in the upper pole the right kidney with calyceal   diverticulum containing multiple calculi. Visualized left kidneys unremarkable. OTHER: No acute or aggressive osseous abnormalities identified.        _______________               CXR Results  (Last 48 hours)    None            Cardiology Procedures:   Results for orders placed or performed during the hospital encounter of 08/16/21   EKG, 12 LEAD, INITIAL   Result Value Ref Range    Ventricular Rate 151 BPM    QRS Duration 86 ms    Q-T Interval 308 ms    QTC Calculation (Bezet) 488 ms    Calculated R Axis 80 degrees    Calculated T Axis 49 degrees    Diagnosis         Atrial fibrillation with rapid ventricular response with premature   ventricular or aberrantly conducted complexes  Nonspecific ST abnormality  Abnormal ECG  When compared with ECG of 16-AUG-2021 10:40,  Atrial fibrillation has replaced Sinus rhythm  Confirmed by Flory Love MD. ([id number]) on 8/19/2021 12:03:19 AM        Echo Results  (Last 48 hours)    None       Cardiolite (Tc-99m Sestamibi) stress test    Signed By: Shahla Kwok MD     August 20, 2021

## 2021-08-20 NOTE — PROGRESS NOTES
0710 : assumed care of patient from [address] : patient transported off floor for stress test.   91 21 06 : patient back on floor from stress test.   1915 : Bedside shift change report given to Duyen Christian RN (oncoming nurse) by Alexus Haider RN (offgoing nurse). Report included the following information SBAR, Kardex, Intake/Output and MAR.

## 2021-08-20 NOTE — PROGRESS NOTES
IV to PO Conversion Recommendation     Patient: Aidan Maria   MRN#: [id number]   Admission Date: [id number]     IV TO PO  Medication(s) Levofloxacin   Oral Meds  Yes   Diet:     Active Orders   Diet    ADULT DIET Regular      TEMP 98.6 °F (37 °C)   WBC Lab Results   Component Value Date/Time    WBC 6.7 08/20/2021 04:15 AM           Pharmacy Dosing Services:  Summary:   Does the patient meet all criteria for IV to PO switch as listed below? Yes   If no, list:    Is the patient excluded from IV to PO automatic switch based on criteria listed below? No   If yes, list:      Criteria for IV to PO switch - Antibiotics   Received IV therapy for at least 24 hours   2. Functioning GI tract   Taking scheduled oral medications  Tolerating diet more advanced than clear liquids   3. No signs/symptoms of shock  No vasopressor support    Criteria for IV to PO switch - Nonantibiotics   Functioning GI tract   Taking scheduled oral medications  Toleratind duet more advanced than clear liquids  2. No signs/symptoms of shock  No vasopressor support        3. No seizures for >72 hours (antiepileptic medications only)    Exclusion Criteria   Patient is being treated for an infection that requires IV therapy such as: endocarditis, osteomyelitis, meningitis, pancreatitis (antibiotics only)   NG tube with continous suction   Nausea and/or vomiting or severe diarrhea within past 24 hours  Malabsorption syndromes, partial or total gastrectomy, ileus, gastric outlet or bowel obstruction  Active GI bleeding   Significant painful oral ulceration  Unable to swallow  On total parenteral nutrition with a NPO order  NPO  Febrile in last 24 hours (antibiotics only)  Clinically deteriorating or unstable (antibiotics only)    This IV to PO conversion is based on the [facility]  [employer] approved automatic conversion policy for eligible patients. Please call with questions.     Bipin Barnard Pharmacist  [phone]

## 2021-08-20 NOTE — PROGRESS NOTES
Problem: Sepsis: Day of Diagnosis  Goal: Off Pathway (Use only if patient is Off Pathway)  Outcome: Progressing Towards Goal  Goal: *Fluid resuscitation  Outcome: Progressing Towards Goal  Goal: *Pneumococcal immunization (if eligible)  Outcome: Progressing Towards Goal  Goal: *Influenza immunization (if eligible)  Outcome: Progressing Towards Goal  Goal: Activity/Safety  Outcome: Progressing Towards Goal  Goal: Consults, if ordered  Outcome: Progressing Towards Goal  Goal: Diagnostic Test/Procedures  Outcome: Progressing Towards Goal  Goal: Nutrition/Diet  Outcome: Progressing Towards Goal  Goal: Discharge Planning  Outcome: Progressing Towards Goal  Goal: Medications  Outcome: Progressing Towards Goal  Goal: Respiratory  Outcome: Progressing Towards Goal  Goal: Treatments/Interventions/Procedures  Outcome: Progressing Towards Goal  Goal: Psychosocial  Outcome: Progressing Towards Goal     Problem: Pain  Goal: *Control of Pain  Outcome: Progressing Towards Goal  Goal: *PALLIATIVE CARE:  Alleviation of Pain  Outcome: Progressing Towards Goal     Problem: Patient Education: Go to Patient Education Activity  Goal: Patient/Family Education  Outcome: Progressing Towards Goal     Problem: Falls - Risk of  Goal: *Absence of Falls  Description: Document Naomi Fall Risk and appropriate interventions in the flowsheet.   Outcome: Progressing Towards Goal  Note: Fall Risk Interventions:            Medication Interventions: Bed/chair exit alarm, Teach patient to arise slowly, Patient to call before getting OOB         History of Falls Interventions: Bed/chair exit alarm, Evaluate medications/consider consulting pharmacy         Problem: Patient Education: Go to Patient Education Activity  Goal: Patient/Family Education  Outcome: Progressing Towards Goal     Problem: Nausea/Vomiting (Adult)  Goal: *Absence of nausea/vomiting  Outcome: Progressing Towards Goal     Problem: Patient Education: Go to Patient Education Activity  Goal: Patient/Family Education  Outcome: Progressing Towards Goal

## 2021-08-20 NOTE — PROGRESS NOTES
Problem: Pain  Goal: *Control of Pain  Outcome: Progressing Towards Goal  Goal: *PALLIATIVE CARE:  Alleviation of Pain  Outcome: Progressing Towards Goal     Problem: Falls - Risk of  Goal: *Absence of Falls  Description: Document Naomi Fall Risk and appropriate interventions in the flowsheet.   Outcome: Progressing Towards Goal  Note: Fall Risk Interventions:            Medication Interventions: Teach patient to arise slowly         History of Falls Interventions: Bed/chair exit alarm, Evaluate medications/consider consulting pharmacy

## 2021-08-20 NOTE — PROGRESS NOTES
Bedside and Verbal shift change report given to Valerie Mcfarlane RN (oncoming nurse) by Shannan Rudd RN (offgoing nurse). Report included the following information SBAR, Kardex, ED Summary, Intake/Output, MAR, Recent Results, Med Rec Status and Cardiac Rhythm NSR.     1950 Shift assessment completed. Patient is resting quietly with eyes wide open and chest rising and falling evenly. No signs of pain. Call bell within reach. Shift was uneventful. Bedside and Verbal shift change report given to Shannan Rudd RN (oncoming nurse) by Valerie Mcfarlane RN (offgoing nurse). Report included the following information SBAR, Kardex, ED Summary, Intake/Output, MAR, Recent Results, Med Rec Status and Cardiac Rhythm NSR.

## 2021-08-20 NOTE — PROGRESS NOTES
Hospitalist Progress Note    Patient: Denzel Ann MRN: [id number]  CSN: [id number]    YOB: 1960  Age: 64 y.o.   Sex: female    DOA: 8/16/2021 LOS:  LOS: 4 days          Chief Complaint:    afib      Assessment/Plan     63 yo female came with sepsis, probable  pyelo     Developed new afib with RVR symptomatic with palpitations  Echo came back with reduced EF 45%  CTA neg for PE    Converted to SR yesterday     Sepsis resolved    New onset afib-converted to SR, on beta blocker, lovenox, stress test completed, result pending  Echo with dec EF 45%    Pyelonephritis     Possible early pneumonia    Fibromyalgia - Celexa    Neuropathy with chronic back pain- Lyrica/gabapentin    Diabetes diet controlled     Hypertension     Right hip pain Chronic      Cultures are negative  Fever broken  She  Is improved    D/c planning for tomorrow after final cardiology recs if doing okay     Disposition :  Patient Active Problem List   Diagnosis Code    Pyelonephritis N12    Sepsis ([address] A41.9    Hyponatremia E87.1    Hypokalemia E87.6    Leukocytosis D72.829    Benign essential hypertension I10    Calculus of kidney N20.0    Chronic bilateral low back pain with right-sided sciatica M54.41, G89.29    Cobalamin deficiency E53.8    Right hip pain M25.551    Diabetes type 2, 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    Gastroesophageal reflux disease K21.9    Peripheral polyneuropathy G62.9       Subjective:    I feel much better  Denies CP, SOB, palp, cough, back pain, dysuria    Review of systems:    Constitutional: denies fevers, chills    Gastrointestinal: denies nausea, vomiting, diarrhea      Vital signs/Intake and Output:  Visit Vitals  /63 (BP 1 Location: Right upper arm, BP Patient Position: At rest)   Pulse 71   Temp 99.1 °F (37.3 °C)   Resp 18   Ht 5' 4\" (1.626 m)   Wt 97.5 kg (214 lb 15.2 oz)   SpO2 95%   Breastfeeding No   BMI 36.90 kg/m²     Current Shift:  No intake/output data recorded. Last three shifts:  08/18 1901 - 08/20 0700  In: 682 [P.O.:120; I.V.:562]  Out: 2300 [Urine:2300]    Exam:    General: Well developed, alert, NAD, OX3  CVS:Regular rate and rhythm, no M/R/G, S1/S2 heard, no thrill  Lungs:Clear to auscultation bilaterally, no wheezes, rhonchi, or rales  Abdomen: Soft, Nontender, No distention, Normal Bowel sounds  Extremities: No C/C/E, pulses palpable 2+  Neuro:grossly normal , follows commands  Psych:appropriate                Labs: Results:       Chemistry Recent Labs     08/20/21 0415 08/19/21  0330 08/18/21  0403   GLU 98 116* 119*    138 136   K 3.7 3.4* 3.3*    104 105   CO2 27 29 23   BUN 7 5* 7   CREA 0.49* 0.56* 0.55*   CA 8.3* 8.3* 8.3*   AGAP 7 5 8   BUCR 14 9* 13   AP 67 78 75   TP 5.5* 5.9* 5.6*   ALB 2.2* 2.4* 2.4*   GLOB 3.3 3.5 3.2   AGRAT 0.7* 0.7* 0.8      CBC w/Diff Recent Labs     08/20/21 0415 08/19/21  0330 08/18/21  0403   WBC 6.7 8.8 11.7   RBC 3.36* 3.61* 3.29*   HGB 10.3* 11.0* 10.0*   HCT 30.6* 32.3* 29.8*    138 116*   GRANS 52 72 85*   LYMPH 33 20* 10*   EOS 5 1 0      Cardiac Enzymes No results for input(s): CPK, CKND1, SALONI in the last 72 hours. No lab exists for component: CKRMB, TROIP   Coagulation No results for input(s): PTP, INR, APTT, INREXT in the last 72 hours. Lipid Panel No results found for: CHOL, CHOLPOCT, CHOLX, CHLST, CHOLV, 890506, HDL, HDLP, LDL, LDLC, DLDLP, 791321, VLDLC, VLDL, TGLX, TRIGL, TRIGP, TGLPOCT, CHHD, CHHDX   BNP No results for input(s): BNPP in the last 72 hours.    Liver Enzymes Recent Labs     08/20/21  0415   TP 5.5*   ALB 2.2*   AP 67      Thyroid Studies Lab Results   Component Value Date/Time    TSH 0.56 08/18/2021 04:03 AM        Procedures/imaging: see electronic medical records for all procedures/Xrays and details which were not copied into this note but were reviewed prior to creation of Henry Currie MD

## 2021-08-21 VITALS
DIASTOLIC BLOOD PRESSURE: 62 MMHG | HEIGHT: 64 IN | TEMPERATURE: 98 F | HEART RATE: 67 BPM | RESPIRATION RATE: 16 BRPM | BODY MASS INDEX: 36.33 KG/M2 | OXYGEN SATURATION: 94 % | WEIGHT: 212.8 LBS | SYSTOLIC BLOOD PRESSURE: 114 MMHG

## 2021-08-21 LAB
ALBUMIN SERPL-MCNC: 2.4 G/DL (ref 3.4–5)
ALBUMIN/GLOB SERPL: 0.7 {RATIO} (ref 0.8–1.7)
ALP SERPL-CCNC: 73 U/L (ref 45–117)
ALT SERPL-CCNC: 37 U/L (ref 13–56)
ANION GAP SERPL CALC-SCNC: 6 MMOL/L (ref 3–18)
AST SERPL-CCNC: 26 U/L (ref 10–38)
BASOPHILS # BLD: 0.1 K/UL (ref 0–0.1)
BASOPHILS NFR BLD: 1 % (ref 0–2)
BILIRUB SERPL-MCNC: 0.2 MG/DL (ref 0.2–1)
BUN SERPL-MCNC: 10 MG/DL (ref 7–18)
BUN/CREAT SERPL: 19 (ref 12–20)
CALCIUM SERPL-MCNC: 8.8 MG/DL (ref 8.5–10.1)
CHLORIDE SERPL-SCNC: 106 MMOL/L (ref 100–111)
CO2 SERPL-SCNC: 29 MMOL/L (ref 21–32)
CREAT SERPL-MCNC: 0.52 MG/DL (ref 0.6–1.3)
DIFFERENTIAL METHOD BLD: ABNORMAL
EOSINOPHIL # BLD: 0.4 K/UL (ref 0–0.4)
EOSINOPHIL NFR BLD: 5 % (ref 0–5)
ERYTHROCYTE [DISTWIDTH] IN BLOOD BY AUTOMATED COUNT: 15.3 % (ref 11.6–14.5)
GLOBULIN SER CALC-MCNC: 3.5 G/DL (ref 2–4)
GLUCOSE BLD STRIP.AUTO-MCNC: 106 MG/DL (ref 70–110)
GLUCOSE BLD STRIP.AUTO-MCNC: 146 MG/DL (ref 70–110)
GLUCOSE SERPL-MCNC: 99 MG/DL (ref 74–99)
HCT VFR BLD AUTO: 31.4 % (ref 35–45)
HGB BLD-MCNC: 10.2 G/DL (ref 12–16)
LYMPHOCYTES # BLD: 2.5 K/UL (ref 0.9–3.6)
LYMPHOCYTES NFR BLD: 30 % (ref 21–52)
MAGNESIUM SERPL-MCNC: 2 MG/DL (ref 1.6–2.6)
MCH RBC QN AUTO: 30.4 PG (ref 24–34)
MCHC RBC AUTO-ENTMCNC: 32.5 G/DL (ref 31–37)
MCV RBC AUTO: 93.7 FL (ref 74–97)
MONOCYTES # BLD: 0.6 K/UL (ref 0.05–1.2)
MONOCYTES NFR BLD: 8 % (ref 3–10)
NEUTS SEG # BLD: 4.6 K/UL (ref 1.8–8)
NEUTS SEG NFR BLD: 56 % (ref 40–73)
PLATELET # BLD AUTO: 183 K/UL (ref 135–420)
PMV BLD AUTO: 12.8 FL (ref 9.2–11.8)
POTASSIUM SERPL-SCNC: 3.5 MMOL/L (ref 3.5–5.5)
PROT SERPL-MCNC: 5.9 G/DL (ref 6.4–8.2)
RBC # BLD AUTO: 3.35 M/UL (ref 4.2–5.3)
SODIUM SERPL-SCNC: 141 MMOL/L (ref 136–145)
WBC # BLD AUTO: 8.3 K/UL (ref 4.6–13.2)

## 2021-08-21 PROCEDURE — 80053 COMPREHEN METABOLIC PANEL: CPT

## 2021-08-21 PROCEDURE — 74011250637 HC RX REV CODE- 250/637: Performed by: INTERNAL MEDICINE

## 2021-08-21 PROCEDURE — 83735 ASSAY OF MAGNESIUM: CPT

## 2021-08-21 PROCEDURE — 74011250637 HC RX REV CODE- 250/637: Performed by: EMERGENCY MEDICINE

## 2021-08-21 PROCEDURE — 36415 COLL VENOUS BLD VENIPUNCTURE: CPT

## 2021-08-21 PROCEDURE — 82962 GLUCOSE BLOOD TEST: CPT

## 2021-08-21 PROCEDURE — 85025 COMPLETE CBC W/AUTO DIFF WBC: CPT

## 2021-08-21 PROCEDURE — 74011250637 HC RX REV CODE- 250/637: Performed by: HOSPITALIST

## 2021-08-21 RX ORDER — GABAPENTIN 100 MG/1
200 CAPSULE ORAL 3 TIMES DAILY
Qty: 30 CAPSULE | Refills: 0 | Status: SHIPPED
Start: 2021-08-21 | End: 2022-04-20

## 2021-08-21 RX ORDER — CITALOPRAM 10 MG/1
10 TABLET ORAL DAILY
Qty: 30 TABLET | Refills: 0 | Status: SHIPPED
Start: 2021-08-22 | End: 2022-05-10

## 2021-08-21 RX ORDER — LEVOFLOXACIN 750 MG/1
750 TABLET ORAL DAILY
Qty: 7 TABLET | Refills: 0 | Status: SHIPPED | OUTPATIENT
Start: 2021-08-22 | End: 2021-08-29

## 2021-08-21 RX ORDER — METOPROLOL TARTRATE 25 MG/1
25 TABLET, FILM COATED ORAL EVERY 12 HOURS
Qty: 60 TABLET | Refills: 2 | Status: SHIPPED | OUTPATIENT
Start: 2021-08-21

## 2021-08-21 RX ADMIN — GABAPENTIN 200 MG: 100 CAPSULE ORAL at 08:14

## 2021-08-21 RX ADMIN — APIXABAN 5 MG: 5 TABLET, FILM COATED ORAL at 08:14

## 2021-08-21 RX ADMIN — METOPROLOL TARTRATE 25 MG: 25 TABLET, FILM COATED ORAL at 08:14

## 2021-08-21 RX ADMIN — LEVOFLOXACIN 750 MG: 500 TABLET, FILM COATED ORAL at 11:54

## 2021-08-21 RX ADMIN — CITALOPRAM HYDROBROMIDE 10 MG: 10 TABLET ORAL at 08:14

## 2021-08-21 RX ADMIN — Medication 10 ML: at 08:15

## 2021-08-21 RX ADMIN — PANTOPRAZOLE SODIUM 40 MG: 40 TABLET, DELAYED RELEASE ORAL at 06:42

## 2021-08-21 NOTE — PROGRESS NOTES
3266 Assumed care of the patient from [address] [facility] (offgoing Nurse). Patient is alert and oriented. Pt denies any pain or discomfort at this moment. bed in low position, call bell within reach.

## 2021-08-21 NOTE — PROGRESS NOTES
Bedside and Verbal shift change report given to Tara Ordoñez RN (oncoming nurse) by Arjun Drummond RN (offgoing nurse). Report included the following information SBAR, Kardex, ED Summary, Intake/Output, MAR, Recent Results, Med Rec Status and Cardiac Rhythm NSR.     2024  Shift assessment complete  Pt resting quietly with eyes closed and chest rising and falling evenly   No c/o pain or signs of distress  Bed locked and in lowest position   Call bell within reach     Shift uneventful     [facility] Night Shift Chart Audit    Chart Audit completed? YES        Bedside and Verbal shift change report given to Breana Hammer RN (oncoming nurse) by Tara Ordoñez RN (offgoing nurse). Report included the following information SBAR, Kardex, ED Summary, Intake/Output, MAR, Recent Results, Med Rec Status and Cardiac Rhythm NSR.

## 2021-08-21 NOTE — PROGRESS NOTES
Problem: Pain  Goal: *Control of Pain  Outcome: Progressing Towards Goal     Problem: Falls - Risk of  Goal: *Absence of Falls  Description: Document Naomi Fall Risk and appropriate interventions in the flowsheet.   Outcome: Progressing Towards Goal  Note: Fall Risk Interventions:            Medication Interventions: Teach patient to arise slowly         History of Falls Interventions: Bed/chair exit alarm, Evaluate medications/consider consulting pharmacy         Problem: Patient Education: Go to Patient Education Activity  Goal: Patient/Family Education  Outcome: Progressing Towards Goal     Problem: Nausea/Vomiting (Adult)  Goal: *Absence of nausea/vomiting  Outcome: Progressing Towards Goal     Problem: Patient Education: Go to Patient Education Activity  Goal: Patient/Family Education  Outcome: Progressing Towards Goal

## 2021-08-21 NOTE — DISCHARGE INSTRUCTIONS
DISCHARGE SUMMARY from Nurse    PATIENT INSTRUCTIONS:    After general anesthesia or intravenous sedation, for 24 hours or while taking prescription Narcotics:  · Limit your activities  · Do not drive and operate hazardous machinery  · Do not make important personal or business decisions  · Do  not drink alcoholic beverages  · If you have not urinated within 8 hours after discharge, please contact your surgeon on call. Report the following to your surgeon:  · Excessive pain, swelling, redness or odor of or around the surgical area  · Temperature over 100.5  · Nausea and vomiting lasting longer than 4 hours or if unable to take medications  · Any signs of decreased circulation or nerve impairment to extremity: change in color, persistent  numbness, tingling, coldness or increase pain  · Any questions    What to do at Home:  Recommended activity: Activity as tolerated,     If you experience any of the following symptoms , please follow up with . *  Please give a list of your current medications to your Primary Care Provider. *  Please update this list whenever your medications are discontinued, doses are      changed, or new medications (including over-the-counter products) are added. *  Please carry medication information at all times in case of emergency situations. These are general instructions for a healthy lifestyle:    No smoking/ No tobacco products/ Avoid exposure to second hand smoke  Surgeon General's Warning:  Quitting smoking now greatly reduces serious risk to your health.     Obesity, smoking, and sedentary lifestyle greatly increases your risk for illness    A healthy diet, regular physical exercise & weight monitoring are important for maintaining a healthy lifestyle    You may be retaining fluid if you have a history of heart failure or if you experience any of the following symptoms:  Weight gain of 3 pounds or more overnight or 5 pounds in a week, increased swelling in our hands or feet or shortness of breath while lying flat in bed. Please call your doctor as soon as you notice any of these symptoms; do not wait until your next office visit. The discharge information has been reviewed with the patient. The patient verbalized understanding. Discharge medications reviewed with the patient and appropriate educational materials and side effects teaching were provided. ___________________________________________________________________________________________________________________________________    Patient armband removed and shredded  Patient Education        Atrial Fibrillation: Care Instructions  Your Care Instructions     Atrial fibrillation is an irregular and often fast heartbeat. Treating this condition is important for several reasons. It can cause blood clots, which can travel from your heart to your brain and cause a stroke. If you have a fast heartbeat, you may feel lightheaded, dizzy, and weak. An irregular heartbeat can also increase your risk for heart failure. Atrial fibrillation is often the result of another heart condition, such as high blood pressure or coronary artery disease. Making changes to improve your heart condition will help you stay healthy and active. Follow-up care is a key part of your treatment and safety. Be sure to make and go to all appointments, and call your doctor if you are having problems. It's also a good idea to know your test results and keep a list of the medicines you take. How can you care for yourself at home? Medicines    · Take your medicines exactly as prescribed. Call your doctor if you think you are having a problem with your medicine. You will get more details on the specific medicines your doctor prescribes.     · If your doctor has given you a blood thinner to prevent a stroke, be sure you get instructions about how to take your medicine safely.  Blood thinners can cause serious bleeding problems.     · Do not take any vitamins, over-the-counter drugs, or herbal products without talking to your doctor first.   Lifestyle changes    · Do not smoke. Smoking can increase your chance of a stroke and heart attack. If you need help quitting, talk to your doctor about stop-smoking programs and medicines. These can increase your chances of quitting for good.     · Eat a heart-healthy diet.     · Stay at a healthy weight. Lose weight if you need to.     · Limit alcohol to 2 drinks a day for men and 1 drink a day for women. Too much alcohol can cause health problems.     · Avoid colds and flu. Get a pneumococcal vaccine shot. If you have had one before, ask your doctor whether you need another dose. Get a flu shot every year. If you must be around people with colds or flu, wash your hands often. Activity    · If your doctor recommends it, get more exercise. Walking is a good choice. Bit by bit, increase the amount you walk every day. Try for at least 30 minutes on most days of the week. You also may want to swim, bike, or do other activities. Your doctor may suggest that you join a cardiac rehabilitation program so that you can have help increasing your physical activity safely.     · Start light exercise if your doctor says it is okay. Even a small amount will help you get stronger, have more energy, and manage stress. Walking is an easy way to get exercise. Start out by walking a little more than you did in the hospital. Gradually increase the amount you walk.     · When you exercise, watch for signs that your heart is working too hard. You are pushing too hard if you cannot talk while you are exercising. If you become short of breath or dizzy or have chest pain, sit down and rest immediately.     · Check your pulse regularly. Place two fingers on the artery at the palm side of your wrist, in line with your thumb. If your heartbeat seems uneven or fast, talk to your doctor. When should you call for help?    Call 911 anytime you think you may need emergency care. For example, call if:    · You have symptoms of a heart attack. These may include:  ? Chest pain or pressure, or a strange feeling in the chest.  ? Sweating. ? Shortness of breath. ? Nausea or vomiting. ? Pain, pressure, or a strange feeling in the back, neck, jaw, or upper belly or in one or both shoulders or arms. ? Lightheadedness or sudden weakness. ? A fast or irregular heartbeat. After you call 911, the  may tell you to chew 1 adult-strength or 2 to 4 low-dose aspirin. Wait for an ambulance. Do not try to drive yourself.     · You have symptoms of a stroke. These may include:  ? Sudden numbness, tingling, weakness, or loss of movement in your face, arm, or leg, especially on only one side of your body. ? Sudden vision changes. ? Sudden trouble speaking. ? Sudden confusion or trouble understanding simple statements. ? Sudden problems with walking or balance. ? A sudden, severe headache that is different from past headaches.     · You passed out (lost consciousness). Call your doctor now or seek immediate medical care if:    · You have new or increased shortness of breath.     · You feel dizzy or lightheaded, or you feel like you may faint.     · Your heart rate becomes irregular.     · You can feel your heart flutter in your chest or skip heartbeats. Tell your doctor if these symptoms are new or worse. Watch closely for changes in your health, and be sure to contact your doctor if you have any problems. Where can you learn more? Go to http://www.gray.com/  Enter U020 in the search box to learn more about \"Atrial Fibrillation: Care Instructions. \"  Current as of: August 31, 2020               Content Version: 12.8  © 1811-5149 [employer]. Care instructions adapted under license by [employer] (which disclaims liability or warranty for this information).  If you have questions about a medical condition or this instruction, always ask your healthcare professional. [place] [address] any warranty or liability for your use of this information. Patient Education        Kidney Infection: Care Instructions  Your Care Instructions     A kidney infection (pyelonephritis) is a type of urinary tract infection, or UTI. Most UTIs are bladder infections. Kidney infections tend to make people much sicker than bladder infections do. A kidney infection is also more serious because it can cause lasting damage if it is not treated quickly. Follow-up care is a key part of your treatment and safety. Be sure to make and go to all appointments, and call your doctor if you are having problems. It's also a good idea to know your test results and keep a list of the medicines you take. How can you care for yourself at home? · Take your antibiotics as directed. Do not stop taking them just because you feel better. You need to take the full course of antibiotics. · Drink plenty of water. This may help wash out bacteria that are causing the infection. If you have kidney, heart, or liver disease and have to limit fluids, talk with your doctor before you increase the amount of fluids you drink. · Urinate often. Try to empty your bladder each time. · To relieve pain, take a hot shower or lay a heating pad (set on low) over your lower belly. Never go to sleep with a heating pad in place. Put a thin cloth between the heating pad and your skin. To help prevent kidney infections  · Drink plenty of water each day. This helps you urinate often, which clears bacteria from your system. If you have kidney, heart, or liver disease and have to limit fluids, talk with your doctor before you increase the amount of fluids you drink. · Urinate when you have the urge. Do not hold your urine for a long time. Urinate before you go to sleep.   · If you have symptoms of a bladder infection, such as burning when you urinate or having to urinate often, call your doctor so you can treat the problem before it gets worse. If you do not treat a bladder infection quickly, it can spread to the kidney. · Men should keep the tip of the penis clean. If you are a woman, keep these ideas in mind:  · Urinate right after you have sex. · Change sanitary pads often. Avoid douches, feminine hygiene sprays, and other feminine hygiene products that have deodorants. · After going to the bathroom, wipe from front to back. When should you call for help? Call your doctor now or seek immediate medical care if:    · You have symptoms that a kidney infection is getting worse. These may include:  ? Pain or burning when you urinate. ? A frequent need to urinate without being able to pass much urine. ? Pain in the flank, which is just below the rib cage and above the waist on either side of the back. ? Blood in the urine. ? A fever.     · You are vomiting or nauseated. Watch closely for changes in your health, and be sure to contact your doctor if:    · You do not get better as expected. Where can you learn more? Go to http://www.gray.com/  Enter B832 in the search box to learn more about \"Kidney Infection: Care Instructions. \"  Current as of: December 17, 2020               Content Version: 12.8  © 2006-2021 [employer]. Care instructions adapted under license by [employer] (which disclaims liability or warranty for this information). If you have questions about a medical condition or this instruction, always ask your healthcare professional. [place] [address] any warranty or liability for your use of this information.

## 2021-08-21 NOTE — DISCHARGE SUMMARY
Discharge Summary    Patient: Augustine Guerrero MRN: [id number]  CSN: [id number]    YOB: 1960  Age: 64 y.o. Sex: female    DOA: 8/16/2021 LOS:  LOS: 5 days   Discharge Date: 8/21/2021     Primary Care Provider:  Robert Dejesus DO    Admission Diagnoses: Pyelonephritis [N12]    Discharge Diagnoses:    Problem List as of 8/21/2021 Date Reviewed: 8/16/2021        Codes Class Noted - Resolved    New onset a-fib [facility]) ICD-10-CM: I48.91  ICD-9-CM: 427.31  8/20/2021 - Present        * (Principal) Pyelonephritis ICD-10-CM: N12  ICD-9-CM: 590.80  8/16/2021 - Present        Sepsis ([facility] ICD-10-CM: A41.9  ICD-9-CM: 038.9, 995.91  8/16/2021 - Present        Hyponatremia ICD-10-CM: E87.1  ICD-9-CM: 276.1  8/16/2021 - Present        Hypokalemia ICD-10-CM: E87.6  ICD-9-CM: 276.8  8/16/2021 - Present        Leukocytosis ICD-10-CM: D72.829  ICD-9-CM: 288.60  8/16/2021 - Present        Diabetes type 2, 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  A dilated fundus exam showed no diabetic retinopathy without macular edema. Maintain optimum BS, BP, and BMI. Monitor annually. Chronic bilateral low back pain with right-sided sciatica ICD-10-CM: M54.41, G89.29  ICD-9-CM: 724.2, 724.3, 338.29  1/18/2019 - Present    Overview Signed 8/16/2021  3:55 PM by Barak Portillo MD     Last Assessment & Plan:   Formatting of this note might be different from the original.  New- will get baseline xray.              Right hip pain ICD-10-CM: M25.551  ICD-9-CM: 719.45  1/18/2019 - Present    Overview Signed 8/16/2021  3:55 PM by Barak Portillo MD     Last Assessment & Plan:   Formatting of this note might be different from the original.  Unchanged- referral sent to ortho as she would like to get another steroid injection which helped her about a year ago. Encouraged weight loss and regular exercise as well. Peripheral polyneuropathy ICD-10-CM: G62.9  ICD-9-CM: 356.9  12/14/2017 - Present    Overview Signed 8/16/2021  3:55 PM by Darek Contreras MD     Last Assessment & Plan:   Formatting of this note might be different from the original.  Unchanged- pt states her symptoms have worsened but very well could be her fibromyalgia as her depression and anxiety have worsened. She has f/u with Dr. Cheryl Barros tomorrow. She is taking 900mg of gabapentin at bedtime. She says the pain is throughout the day as well. Recommended taking tid which I have prescribed - specifically 300mg, 300mg mid-afternoon and 600mg qhs. May also benefit from TCA to help with depression and neuropathy/fibromyalgia pain. Benign essential hypertension ICD-10-CM: I10  ICD-9-CM: 401.1  9/5/2013 - Present    Overview Signed 8/16/2021  3:55 PM by Darek Contreras MD     Last Assessment & Plan:   Formatting of this note might be different from the original.  Unchanged- BP continues to be uncontrolled. I am going to add losartan 50mg to her HCTZ 25mg daily. I have given her a hard copy of BP monitor order to see if her insurance will cover. Otherwise, pt to come to the office for 3 BP checks before our next f/u visit in 6 weeks. Pt is to wait a week after she has started the zoloft and knows she is tolerating before starting the losartan. Cobalamin deficiency ICD-10-CM: E53.8  ICD-9-CM: 266.2  2/7/2013 - Present    Overview Signed 8/16/2021  3:55 PM by Darek Contreras MD     Last Assessment & Plan:   Formatting of this note might be different from the original.  Unchanged- at goal.  Will continue current management.              Calculus of kidney ICD-10-CM: N20.0  ICD-9-CM: 592.0  9/17/2012 - Present        Gastroesophageal reflux disease ICD-10-CM: K21.9  ICD-9-CM: 530.81  9/29/2003 - Present Overview Signed 8/16/2021  3:55 PM by She Calzada MD     Last Assessment & Plan:   Formatting of this note might be different from the original.  Unchanged- suggested trying to stop the PPI and see if she is able to not take it. Reviewed long term side effects of PPI therapy. Discharge Medications:     Discharge Medication List as of 8/21/2021  1:12 PM      START taking these medications    Details   gabapentin (NEURONTIN) 100 mg capsule Take 2 Capsules by mouth three (3) times daily. Max Daily Amount: 600 mg., No Print, Disp-30 Capsule, R-0      citalopram (CELEXA) 10 mg tablet Take 1 Tablet by mouth daily. , No Print, Disp-30 Tablet, R-0      apixaban (ELIQUIS) 5 mg tablet Take 1 Tablet by mouth two (2) times a day., Normal, Disp-60 Tablet, R-2      levoFLOXacin (LEVAQUIN) 750 mg tablet Take 1 Tablet by mouth daily for 7 days. , Normal, Disp-7 Tablet, R-0      metoprolol tartrate (LOPRESSOR) 25 mg tablet Take 1 Tablet by mouth every twelve (12) hours. , Normal, Disp-60 Tablet, R-2             Discharge Condition: Good    Procedures : None    Consults: Cardiology      PHYSICAL EXAM   Visit Vitals  /62 (BP 1 Location: Left upper arm, BP Patient Position: At rest)   Pulse 67   Temp 98 °F (36.7 °C)   Resp 16   Ht 5' 4\" (1.626 m)   Wt 96.5 kg (212 lb 12.8 oz)   SpO2 94%   Breastfeeding No   BMI 36.53 kg/m²     General: Awake, cooperative, no acute distress    HEENT: NC, Atraumatic. PERRLA, EOMI. Anicteric sclerae. Lungs:  CTA Bilaterally. No Wheezing/Rhonchi/Rales. Heart:  Regular  rhythm,  No murmur, No Rubs, No Gallops  Abdomen: Soft, Non distended, Non tender. +Bowel sounds,   Extremities: No c/c/e  Psych:   Not anxious or agitated. Neurologic:  No acute neurological deficits.                                      Admission HPI :    Patient is a 26-year-old female with history of fibromyalgia pain who presents to the emergency room after 3-day history of high fever that started this morning worsening back pain over the last 2 days chills associated with nausea vomiting vague left quadrant pain. She came to the emergency room as she is on symptoms of high fever for in the emergency room rapid Covid was negative blood cultures and urine cultures were obtained CT scan did confirm pyonephritis no other abnormalities of the lung except for early pneumonia patient states she is also been coughing some and congested has used Robitussin with no improvement. Patient is fully vaccinated against UKHWM-55 her last dose was in March she took Libersy vaccine she denies any COVID-19 exposures. Hospital Course :   Ms. Thomas Ruth was admitted to monitored floor, she was started on antibiotics for her pyelonephritis. Her urine cultures did not show any growth. She had clinical improvement with her symptoms. She had new onset atrial fibrillation, she was started on beta-blocker. She converted to sinus rhythm. She was seen and followed by cardiology. Her echo with EF of 45%. She underwent stress test that was normal.  Did not show any ischemic changes. Cardiology recommended discharging on beta-blocker and also anticoagulation with Eliquis.     Activity: Activity as tolerated    Diet: Cardiac Diet    Follow-up: PCP, cardiology    Disposition: home    Minutes spent on discharge: 45       Labs: Results:       Chemistry Recent Labs     08/21/21 0231 08/20/21 0415 08/19/21  0330   GLU 99 98 116*    140 138   K 3.5 3.7 3.4*    106 104   CO2 29 27 29   BUN 10 7 5*   CREA 0.52* 0.49* 0.56*   CA 8.8 8.3* 8.3*   AGAP 6 7 5   BUCR 19 14 9*   AP 73 67 78   TP 5.9* 5.5* 5.9*   ALB 2.4* 2.2* 2.4*   GLOB 3.5 3.3 3.5   AGRAT 0.7* 0.7* 0.7*      CBC w/Diff Recent Labs     08/21/21 0231 08/20/21  0415 08/19/21  0330   WBC 8.3 6.7 8.8   RBC 3.35* 3.36* 3.61*   HGB 10.2* 10.3* 11.0*   HCT 31.4* 30.6* 32.3*    155 138   GRANS 56 52 72   LYMPH 30 33 20*   EOS 5 5 1      Cardiac Enzymes No results for input(s): CPK, CKND1, SALONI in the last 72 hours. No lab exists for component: CKRMB, TROIP   Coagulation No results for input(s): PTP, INR, APTT, INREXT in the last 72 hours. Lipid Panel No results found for: CHOL, CHOLPOCT, CHOLX, CHLST, CHOLV, 750198, HDL, HDLP, LDL, LDLC, DLDLP, 125445, VLDLC, VLDL, TGLX, TRIGL, TRIGP, TGLPOCT, CHHD, CHHDX   BNP No results for input(s): BNPP in the last 72 hours. Liver Enzymes Recent Labs     08/21/21  0231   TP 5.9*   ALB 2.4*   AP 73      Thyroid Studies Lab Results   Component Value Date/Time    TSH 0.56 08/18/2021 04:03 AM            Significant Diagnostic Studies: CT CHEST ABD PELV W CONT    Result Date: 8/16/2021  EXAM: CT chest, abdomen, and pelvis INDICATION: Fever, vomiting, potentially urinary tract infection. Flank pain. COMPARISON: None. TECHNIQUE: Axial CT imaging of the chest, abdomen, and pelvis was performed after IV contrast administration. Multiplanar reformats were generated. One or more dose reduction techniques were used on this CT: automated exposure control, adjustment of the mAs and/or kVp according to patient size, and iterative reconstruction techniques. The specific techniques used on this CT exam have been documented in the patient's electronic medical record. Digital Imaging and Communications in Medicine (DICOM) format image data are available to nonaffiliated external healthcare facilities or entities on a secure, media free, reciprocally searchable basis with patient authorization for at least a 12-month period after this study. _______________ FINDINGS: CHEST: MEDIASTINUM: The thoracic aorta is unremarkable. Number cardiac size. No pericardial effusion. LYMPH NODES: No pathologically enlarged mediastinal or hilar lymph nodes. PLEURA: Unremarkable without pleural effusion or pneumothorax. LUNGS/AIRWAY: Central airways are patent. Subsegmental atelectasis present at the lung bases, left greater than right. No alveolar consolidation.  No suspicious appearing pulmonary nodule or mass lesion. OTHER: None. ABDOMEN/PELVIS: LIVER, BILIARY: Liver is unremarkable. No abnormal biliary dilation. Gallbladder is unremarkable. PANCREAS: Unremarkable. SPLEEN: Scattered granulomata, otherwise unremarkable. ADRENALS: Unremarkable. KIDNEYS: Moderate left-sided perinephric soft tissue stranding and intrarenal edema with urothelial technique and enhancement. No evidence of hydronephrosis. Several areas of diminished perfusion peripheral portion of the mid to lower pole calyces of the left kidney. Right kidney is remarkable for a small upper pole calyceal diverticulum containing several small stones. No evidence of hydronephrosis or obstructive uropathy. Urinary bladder unremarkable. LYMPH NODES: No pathologically enlarged lymph nodes. GASTROINTESTINAL TRACT: Small hiatus hernia. No morphology of bowel obstruction. No free intraperitoneal gas. Normal appendix. No abnormal bowel wall thickening or inflammatory change. PELVIC ORGANS: Unremarkable. VASCULATURE: Mild atherosclerosis. OSSEOUS: No acute or aggressive appearing osseous abnormality. OTHER: None. _______________     1. Within the chest:   > Mild bibasilar atelectasis without evidence of pneumonia or pleural effusion. 2. Within the abdomen/pelvis:   > CT findings in keeping with pyelitis and pyelonephritis involving the left kidney without evidence of hydronephrosis or complicating left renal stone. > Upper pole right kidney calyceal diverticulum containing several small stones.   > No abnormal bowel wall thickening or inflammatory change. Normal appendix. CTA CHEST W OR W WO CONT    Result Date: 8/19/2021  EXAM: CTA chest INDICATION: Evaluate for PE COMPARISON: August 16, 2021 TECHNIQUE: Axial CT imaging from the thoracic inlet through the diaphragm with intravenous contrast. Coronal and sagittal MIP reformats were generated.  One or more dose reduction techniques were used on this CT: automated exposure control, adjustment of the mAs and/or kVp according to patient size, and iterative reconstruction techniques. The specific techniques used on this CT exam have been documented in the patient's electronic medical record. Digital Imaging and Communications in Medicine (DICOM) format image data are available to nonaffiliated external healthcare facilities or entities on a secure, media free, reciprocally searchable basis with patient authorization for at least a 12-month period after this study. _______________ FINDINGS: EXAM QUALITY: Overall exam quality is satisfactory. Pulmonary arterial enhancement is adequate with adequate breath hold and no significant artifact. PULMONARY ARTERIES: No evidence of pulmonary embolism. LYMPH Nodes: No enlarged lymph nodes seen. PLEURA: Minimal bilateral effusions are seen. HEART: Cardiomegaly. There is no pericardial effusion. Mild calcific coronary artery disease present. VASCULATURE/MEDIASTINUM: Small hiatal hernia present. There is no aortic dissection. LUNGS: No suspicious nodule or mass. No abnormal opacities. There is moderate emphysema. AIRWAY: Normal. UPPER ABDOMEN: Visualized liver, gallbladder, adrenals, spleen, and pancreas unremarkable. There is scarring in the upper pole the right kidney with calyceal diverticulum containing multiple calculi. Visualized left kidneys unremarkable. OTHER: No acute or aggressive osseous abnormalities identified. _______________     No evidence of a pulmonary embolism or aortic dissection. Small hiatal hernia. Moderate emphysema. XR CHEST PORT    Result Date: 8/16/2021  EXAM: XR CHEST PORT CLINICAL INDICATION/HISTORY: Chest pain and shortness of breath -Additional: None COMPARISON: None TECHNIQUE: Portable frontal view of the chest _______________ FINDINGS: SUPPORT DEVICES: None. HEART AND MEDIASTINUM: Normal cardiac size and mediastinal contours. LUNGS AND PLEURAL SPACES: Faint asymmetric left lung base opacity noted.  No pneumothorax or pleural effusion. BONY THORAX AND SOFT TISSUES: Unremarkable. _______________     Faint asymmetric left lung base opacity, potentially developing infiltrate. No accompanying pleural effusion. ECHO ADULT COMPLETE    Result Date: 8/18/2021  · LV: Estimated LVEF is 40 - 45%. Normal cavity size. Increased wall thickness. E'E= 6.43. Cannot exclude wall motion abnormalities due to tachycardia and patient's heart rate was 150 bpm during echocardiogram. · TV: Mild tricuspid valve regurgitation is present. · PA: Pulmonary arterial systolic pressure is 35 mmHg. NUCLEAR CARDIAC STRESS TEST    Result Date: 8/20/2021  · Baseline ECG: Sinus rhythm, rare PACs. · Stress test: Negative stress test. Exercise stress test: EKG stress test results correlate with a low risk of inducible myocardial ischemia. · SPECT: Left ventricular function post-stress was normal. Calculated ejection fraction is 55%. There is no evidence of transient ischemic dilation (TID). · SPECT: Myocardial perfusion imaging defect 1: caused by soft tissue. No results found for this or any previous visit. Please note that this dictation was completed with [employer] the [employer] voice recognition software. Quite often unanticipated grammatical, syntax, homophones, and other interpretive errors are inadvertently transcribed by the computer software. Please disregard these errors. Please excuse any errors that have escaped final proofreading.      CC: Riley Dominguez, DO

## 2021-08-22 LAB
BACTERIA SPEC CULT: NORMAL
BACTERIA SPEC CULT: NORMAL
SERVICE CMNT-IMP: NORMAL
SERVICE CMNT-IMP: NORMAL

## 2021-08-25 NOTE — PROGRESS NOTES
Physician Progress Note      PATIENT:               Amaya Mon  CSN #:                  [id number]  :                       1960  ADMIT DATE:       2021 10:28 AM  DISCH DATE:        2021 1:46 PM  RESPONDING  PROVIDER #:        Rocky Gannon MD          QUERY TEXT:    Pt admitted with  sepsis  . Pt noted to have  early pneumonia documented in H&P . If possible, please document in the progress notes and discharge summary if you are evaluating and/or treating any of the following:    Note: CAP and HCAP indicate where the pneumonia was acquired, not a specific type. The medical record reflects the following:  Risk Factors: *sepsis  Clinical Indicators: Documented In H&P Possible early pneumonia urine streptococcal antigen and pneumococcal antigens obtained she should be covered with being on Levaquin for this \". CT CHEST- Mild bibasilar atelectasis without evidence of pneumonia or pleural effusion. . Negative Strep pneumo Ag, urine.       Treatment: Levaquin    Thank You  Sunil Nieves RN [facility]  [phone]  Options provided:  -- Pneumonia confirmed  -- Pneumonia ruled out  -- Other - I will add my own diagnosis  -- Disagree - Not applicable / Not valid  -- Disagree - Clinically unable to determine / Unknown  -- Refer to Clinical Documentation Reviewer    PROVIDER RESPONSE TEXT:    Pneumonia ruled out    Query created by: Jocelyne Shankar on 2021 10:42 AM      Electronically signed by:  Rocky Gannon MD 2021 8:21 AM

## 2022-03-18 PROBLEM — D72.829 LEUKOCYTOSIS: Status: ACTIVE | Noted: 2021-08-16

## 2022-03-18 PROBLEM — G62.9 PERIPHERAL POLYNEUROPATHY: Status: ACTIVE | Noted: 2017-12-14

## 2022-03-19 PROBLEM — A41.9 SEPSIS (HCC): Status: ACTIVE | Noted: 2021-08-16

## 2022-03-19 PROBLEM — M25.551 RIGHT HIP PAIN: Status: ACTIVE | Noted: 2019-01-18

## 2022-03-19 PROBLEM — M54.41 CHRONIC BILATERAL LOW BACK PAIN WITH RIGHT-SIDED SCIATICA: Status: ACTIVE | Noted: 2019-01-18

## 2022-03-19 PROBLEM — E87.1 HYPONATREMIA: Status: ACTIVE | Noted: 2021-08-16

## 2022-03-19 PROBLEM — G89.29 CHRONIC BILATERAL LOW BACK PAIN WITH RIGHT-SIDED SCIATICA: Status: ACTIVE | Noted: 2019-01-18

## 2022-03-20 PROBLEM — I48.91 NEW ONSET A-FIB (HCC): Status: ACTIVE | Noted: 2021-08-20

## 2022-03-20 PROBLEM — N12 PYELONEPHRITIS: Status: ACTIVE | Noted: 2021-08-16

## 2022-03-20 PROBLEM — E87.6 HYPOKALEMIA: Status: ACTIVE | Noted: 2021-08-16

## 2022-03-20 PROBLEM — E11.9 DIABETES TYPE 2, NO OCULAR INVOLVEMENT (HCC): Status: ACTIVE | Noted: 2021-05-04

## 2022-04-11 ENCOUNTER — HOSPITAL ENCOUNTER (OUTPATIENT)
Dept: PREADMISSION TESTING | Age: 62
Discharge: HOME | End: 2022-04-11
Payer: MEDICARE

## 2022-04-11 ENCOUNTER — TRANSCRIBE ORDER (OUTPATIENT)
Dept: REGISTRATION | Age: 62
End: 2022-04-11

## 2022-04-11 DIAGNOSIS — M16.11 PRIMARY OSTEOARTHRITIS OF RIGHT HIP: ICD-10-CM

## 2022-04-11 DIAGNOSIS — M16.11 PRIMARY OSTEOARTHRITIS OF RIGHT HIP: Primary | ICD-10-CM

## 2022-04-11 LAB
ALBUMIN SERPL-MCNC: 3.6 G/DL (ref 3.4–5)
ALBUMIN/GLOB SERPL: 1.1 {RATIO} (ref 0.8–1.7)
ALP SERPL-CCNC: 99 U/L (ref 45–117)
ALT SERPL-CCNC: 19 U/L (ref 13–56)
ANION GAP SERPL CALC-SCNC: 3 MMOL/L (ref 3–18)
APPEARANCE UR: CLEAR
APTT PPP: 26.7 SEC (ref 23–36.4)
AST SERPL-CCNC: 17 U/L (ref 10–38)
BACTERIA URNS QL MICRO: ABNORMAL /HPF
BASOPHILS # BLD: 0 K/UL (ref 0–0.1)
BASOPHILS NFR BLD: 0 % (ref 0–2)
BILIRUB SERPL-MCNC: 0.2 MG/DL (ref 0.2–1)
BILIRUB UR QL: NEGATIVE
BUN SERPL-MCNC: 19 MG/DL (ref 7–18)
BUN/CREAT SERPL: 25 (ref 12–20)
CALCIUM SERPL-MCNC: 8.9 MG/DL (ref 8.5–10.1)
CHLORIDE SERPL-SCNC: 107 MMOL/L (ref 100–111)
CO2 SERPL-SCNC: 30 MMOL/L (ref 21–32)
COLOR UR: YELLOW
CREAT SERPL-MCNC: 0.75 MG/DL (ref 0.6–1.3)
DIFFERENTIAL METHOD BLD: ABNORMAL
EOSINOPHIL # BLD: 0.3 K/UL (ref 0–0.4)
EOSINOPHIL NFR BLD: 4 % (ref 0–5)
EPITH CASTS URNS QL MICRO: ABNORMAL /LPF (ref 0–5)
ERYTHROCYTE [DISTWIDTH] IN BLOOD BY AUTOMATED COUNT: 13.5 % (ref 11.6–14.5)
EST. AVERAGE GLUCOSE BLD GHB EST-MCNC: 114 MG/DL
GLOBULIN SER CALC-MCNC: 3.4 G/DL (ref 2–4)
GLUCOSE SERPL-MCNC: 116 MG/DL (ref 74–99)
GLUCOSE UR STRIP.AUTO-MCNC: NEGATIVE MG/DL
HBA1C MFR BLD: 5.6 % (ref 4.2–5.6)
HCT VFR BLD AUTO: 38.1 % (ref 35–45)
HGB BLD-MCNC: 12.5 G/DL (ref 12–16)
HGB UR QL STRIP: ABNORMAL
HYALINE CASTS URNS QL MICRO: ABNORMAL /LPF (ref 0–2)
IMM GRANULOCYTES # BLD AUTO: 0 K/UL (ref 0–0.04)
IMM GRANULOCYTES NFR BLD AUTO: 0 % (ref 0–0.5)
INR PPP: 0.9 (ref 0.8–1.2)
KETONES UR QL STRIP.AUTO: NEGATIVE MG/DL
LEUKOCYTE ESTERASE UR QL STRIP.AUTO: NEGATIVE
LYMPHOCYTES # BLD: 2.4 K/UL (ref 0.9–3.6)
LYMPHOCYTES NFR BLD: 33 % (ref 21–52)
MCH RBC QN AUTO: 32.1 PG (ref 24–34)
MCHC RBC AUTO-ENTMCNC: 32.8 G/DL (ref 31–37)
MCV RBC AUTO: 97.7 FL (ref 78–100)
MONOCYTES # BLD: 0.4 K/UL (ref 0.05–1.2)
MONOCYTES NFR BLD: 5 % (ref 3–10)
NEUTS SEG # BLD: 4.3 K/UL (ref 1.8–8)
NEUTS SEG NFR BLD: 58 % (ref 40–73)
NITRITE UR QL STRIP.AUTO: NEGATIVE
NRBC # BLD: 0 K/UL (ref 0–0.01)
NRBC BLD-RTO: 0 PER 100 WBC
PH UR STRIP: 5 [PH] (ref 5–8)
PLATELET # BLD AUTO: 192 K/UL (ref 135–420)
PMV BLD AUTO: 12.2 FL (ref 9.2–11.8)
POTASSIUM SERPL-SCNC: 3.7 MMOL/L (ref 3.5–5.5)
PROT SERPL-MCNC: 7 G/DL (ref 6.4–8.2)
PROT UR STRIP-MCNC: NEGATIVE MG/DL
PROTHROMBIN TIME: 11.6 SEC (ref 11.5–15.2)
RBC # BLD AUTO: 3.9 M/UL (ref 4.2–5.3)
RBC #/AREA URNS HPF: ABNORMAL /HPF (ref 0–5)
SODIUM SERPL-SCNC: 140 MMOL/L (ref 136–145)
SP GR UR REFRACTOMETRY: 1.03 (ref 1–1.03)
UROBILINOGEN UR QL STRIP.AUTO: 0.2 EU/DL (ref 0.2–1)
WBC # BLD AUTO: 7.4 K/UL (ref 4.6–13.2)
WBC URNS QL MICRO: ABNORMAL /HPF (ref 0–5)

## 2022-04-11 PROCEDURE — 87086 URINE CULTURE/COLONY COUNT: CPT

## 2022-04-11 PROCEDURE — 85610 PROTHROMBIN TIME: CPT

## 2022-04-11 PROCEDURE — 83036 HEMOGLOBIN GLYCOSYLATED A1C: CPT

## 2022-04-11 PROCEDURE — 93005 ELECTROCARDIOGRAM TRACING: CPT

## 2022-04-11 PROCEDURE — 36415 COLL VENOUS BLD VENIPUNCTURE: CPT

## 2022-04-11 PROCEDURE — 85730 THROMBOPLASTIN TIME PARTIAL: CPT

## 2022-04-11 PROCEDURE — 81001 URINALYSIS AUTO W/SCOPE: CPT

## 2022-04-11 PROCEDURE — 85025 COMPLETE CBC W/AUTO DIFF WBC: CPT

## 2022-04-11 PROCEDURE — 80053 COMPREHEN METABOLIC PANEL: CPT

## 2022-04-12 LAB
ATRIAL RATE: 63 BPM
BACTERIA SPEC CULT: NORMAL
CALCULATED P AXIS, ECG09: 38 DEGREES
CALCULATED R AXIS, ECG10: 41 DEGREES
CALCULATED T AXIS, ECG11: 47 DEGREES
CC UR VC: NORMAL
DIAGNOSIS, 93000: NORMAL
P-R INTERVAL, ECG05: 174 MS
Q-T INTERVAL, ECG07: 448 MS
QRS DURATION, ECG06: 86 MS
QTC CALCULATION (BEZET), ECG08: 458 MS
SERVICE CMNT-IMP: NORMAL
SERVICE CMNT-IMP: NORMAL
VENTRICULAR RATE, ECG03: 63 BPM

## 2022-04-20 ENCOUNTER — HOSPITAL ENCOUNTER (OUTPATIENT)
Dept: PREADMISSION TESTING | Age: 62
Discharge: HOME | End: 2022-04-20

## 2022-04-20 VITALS — BODY MASS INDEX: 33.29 KG/M2 | HEIGHT: 64 IN | WEIGHT: 195 LBS

## 2022-04-20 RX ORDER — TRAZODONE HYDROCHLORIDE 50 MG/1
TABLET ORAL
COMMUNITY
End: 2022-05-10

## 2022-04-20 RX ORDER — DESVENLAFAXINE 100 MG/1
TABLET, EXTENDED RELEASE ORAL
COMMUNITY
End: 2022-06-14

## 2022-04-20 RX ORDER — ERGOCALCIFEROL (VITAMIN D2) 10 MCG
1 TABLET ORAL DAILY
COMMUNITY

## 2022-04-20 RX ORDER — GABAPENTIN 300 MG/1
300 CAPSULE ORAL 3 TIMES DAILY
COMMUNITY

## 2022-04-20 RX ORDER — BUPROPION HYDROCHLORIDE 150 MG/1
150 TABLET ORAL DAILY
COMMUNITY

## 2022-04-20 RX ORDER — OMEPRAZOLE 20 MG/1
20 CAPSULE, DELAYED RELEASE ORAL DAILY
COMMUNITY

## 2022-04-20 RX ORDER — HYDROCHLOROTHIAZIDE 25 MG/1
25 TABLET ORAL DAILY
COMMUNITY

## 2022-04-20 RX ORDER — LOSARTAN POTASSIUM 50 MG/1
50 TABLET ORAL DAILY
COMMUNITY

## 2022-04-20 RX ORDER — CEFAZOLIN SODIUM/WATER 2 G/20 ML
2 SYRINGE (ML) INTRAVENOUS ONCE
Status: CANCELLED | OUTPATIENT
Start: 2022-04-20 | End: 2022-04-20

## 2022-04-20 RX ORDER — SODIUM CHLORIDE, SODIUM LACTATE, POTASSIUM CHLORIDE, CALCIUM CHLORIDE 600; 310; 30; 20 MG/100ML; MG/100ML; MG/100ML; MG/100ML
125 INJECTION, SOLUTION INTRAVENOUS CONTINUOUS
Status: CANCELLED | OUTPATIENT
Start: 2022-04-20

## 2022-04-20 RX ORDER — CHOLECALCIFEROL (VITAMIN D3) 50 MCG
1 CAPSULE ORAL EVERY OTHER DAY
COMMUNITY

## 2022-04-20 NOTE — PERIOP NOTES
PAT phone interview completed. Patient made aware to be NPO. Patient stated she had two doses of COVID vaccine. Patient made aware not to get COVID test. Reviewed surgical skin preparation with patient. Patient stated understanding. Opportunity for questions given. Reviewed expectations of discharge and length of stay. Patient stated she has a ride for discharge and someone to stay with her for 24 hours after procedure. Patient made aware not to wear jewelry, lotion, oil or spray on DOS. Patient stated she does not have a DNR order.

## 2022-04-25 ENCOUNTER — APPOINTMENT (OUTPATIENT)
Dept: GENERAL RADIOLOGY | Age: 62
End: 2022-04-25
Attending: STUDENT IN AN ORGANIZED HEALTH CARE EDUCATION/TRAINING PROGRAM
Payer: MEDICARE

## 2022-04-25 ENCOUNTER — APPOINTMENT (OUTPATIENT)
Dept: CT IMAGING | Age: 62
End: 2022-04-25
Attending: STUDENT IN AN ORGANIZED HEALTH CARE EDUCATION/TRAINING PROGRAM
Payer: MEDICARE

## 2022-04-25 ENCOUNTER — ANESTHESIA (OUTPATIENT)
Dept: SURGERY | Age: 62
End: 2022-04-25
Payer: MEDICARE

## 2022-04-25 ENCOUNTER — HOSPITAL ENCOUNTER (EMERGENCY)
Age: 62
Discharge: HOME | End: 2022-04-25
Attending: STUDENT IN AN ORGANIZED HEALTH CARE EDUCATION/TRAINING PROGRAM
Payer: MEDICARE

## 2022-04-25 ENCOUNTER — HOSPITAL ENCOUNTER (OUTPATIENT)
Age: 62
Setting detail: OUTPATIENT SURGERY
Discharge: HOME | End: 2022-04-25
Attending: ORTHOPAEDIC SURGERY | Admitting: ORTHOPAEDIC SURGERY
Payer: MEDICARE

## 2022-04-25 ENCOUNTER — ANESTHESIA EVENT (OUTPATIENT)
Dept: SURGERY | Age: 62
End: 2022-04-25
Payer: MEDICARE

## 2022-04-25 VITALS
WEIGHT: 186.6 LBS | SYSTOLIC BLOOD PRESSURE: 146 MMHG | TEMPERATURE: 96.9 F | RESPIRATION RATE: 18 BRPM | HEIGHT: 64 IN | HEART RATE: 101 BPM | DIASTOLIC BLOOD PRESSURE: 99 MMHG | BODY MASS INDEX: 31.86 KG/M2 | OXYGEN SATURATION: 97 %

## 2022-04-25 VITALS
WEIGHT: 194 LBS | BODY MASS INDEX: 33.12 KG/M2 | DIASTOLIC BLOOD PRESSURE: 66 MMHG | OXYGEN SATURATION: 98 % | HEIGHT: 64 IN | SYSTOLIC BLOOD PRESSURE: 107 MMHG | RESPIRATION RATE: 15 BRPM | HEART RATE: 71 BPM

## 2022-04-25 DIAGNOSIS — E87.6 HYPOKALEMIA: ICD-10-CM

## 2022-04-25 DIAGNOSIS — R06.09 DYSPNEA ON EXERTION: Primary | ICD-10-CM

## 2022-04-25 DIAGNOSIS — Z86.79 HISTORY OF ATRIAL FIBRILLATION: ICD-10-CM

## 2022-04-25 DIAGNOSIS — R07.89 CHEST PRESSURE: ICD-10-CM

## 2022-04-25 DIAGNOSIS — I10 BENIGN ESSENTIAL HYPERTENSION: ICD-10-CM

## 2022-04-25 LAB
ABO + RH BLD: NORMAL
ANION GAP SERPL CALC-SCNC: 3 MMOL/L (ref 3–18)
APPEARANCE UR: CLEAR
ATRIAL RATE: 61 BPM
ATRIAL RATE: 72 BPM
BACTERIA URNS QL MICRO: ABNORMAL /HPF
BASOPHILS # BLD: 0 K/UL (ref 0–0.1)
BASOPHILS NFR BLD: 1 % (ref 0–2)
BILIRUB UR QL: NEGATIVE
BLOOD GROUP ANTIBODIES SERPL: NORMAL
BNP SERPL-MCNC: 68 PG/ML (ref 0–900)
BUN SERPL-MCNC: 15 MG/DL (ref 7–18)
BUN/CREAT SERPL: 26 (ref 12–20)
CALCIUM SERPL-MCNC: 9.5 MG/DL (ref 8.5–10.1)
CALCULATED P AXIS, ECG09: -6 DEGREES
CALCULATED P AXIS, ECG09: 39 DEGREES
CALCULATED R AXIS, ECG10: 36 DEGREES
CALCULATED R AXIS, ECG10: 63 DEGREES
CALCULATED T AXIS, ECG11: 54 DEGREES
CALCULATED T AXIS, ECG11: 66 DEGREES
CHLORIDE SERPL-SCNC: 106 MMOL/L (ref 100–111)
CO2 SERPL-SCNC: 29 MMOL/L (ref 21–32)
COLOR UR: YELLOW
CREAT SERPL-MCNC: 0.57 MG/DL (ref 0.6–1.3)
D DIMER PPP FEU-MCNC: 0.58 UG/ML(FEU)
DIAGNOSIS, 93000: NORMAL
DIAGNOSIS, 93000: NORMAL
DIFFERENTIAL METHOD BLD: ABNORMAL
EOSINOPHIL # BLD: 0.3 K/UL (ref 0–0.4)
EOSINOPHIL NFR BLD: 4 % (ref 0–5)
EPITH CASTS URNS QL MICRO: ABNORMAL /LPF (ref 0–5)
ERYTHROCYTE [DISTWIDTH] IN BLOOD BY AUTOMATED COUNT: 13.3 % (ref 11.6–14.5)
GLUCOSE SERPL-MCNC: 93 MG/DL (ref 74–99)
GLUCOSE UR STRIP.AUTO-MCNC: NEGATIVE MG/DL
HCT VFR BLD AUTO: 38.8 % (ref 35–45)
HGB BLD-MCNC: 12.9 G/DL (ref 12–16)
HGB UR QL STRIP: ABNORMAL
IMM GRANULOCYTES # BLD AUTO: 0 K/UL (ref 0–0.04)
IMM GRANULOCYTES NFR BLD AUTO: 0 % (ref 0–0.5)
KETONES UR QL STRIP.AUTO: 15 MG/DL
LEUKOCYTE ESTERASE UR QL STRIP.AUTO: ABNORMAL
LYMPHOCYTES # BLD: 3.5 K/UL (ref 0.9–3.6)
LYMPHOCYTES NFR BLD: 42 % (ref 21–52)
MCH RBC QN AUTO: 31.5 PG (ref 24–34)
MCHC RBC AUTO-ENTMCNC: 33.2 G/DL (ref 31–37)
MCV RBC AUTO: 94.6 FL (ref 78–100)
MONOCYTES # BLD: 0.5 K/UL (ref 0.05–1.2)
MONOCYTES NFR BLD: 6 % (ref 3–10)
MUCOUS THREADS URNS QL MICRO: ABNORMAL /LPF
NEUTS SEG # BLD: 4 K/UL (ref 1.8–8)
NEUTS SEG NFR BLD: 48 % (ref 40–73)
NITRITE UR QL STRIP.AUTO: NEGATIVE
NRBC # BLD: 0 K/UL (ref 0–0.01)
NRBC BLD-RTO: 0 PER 100 WBC
P-R INTERVAL, ECG05: 146 MS
P-R INTERVAL, ECG05: 182 MS
PH UR STRIP: 7 [PH] (ref 5–8)
PLATELET # BLD AUTO: 166 K/UL (ref 135–420)
PMV BLD AUTO: 12 FL (ref 9.2–11.8)
POTASSIUM SERPL-SCNC: 3.3 MMOL/L (ref 3.5–5.5)
PROT UR STRIP-MCNC: NEGATIVE MG/DL
Q-T INTERVAL, ECG07: 446 MS
Q-T INTERVAL, ECG07: 454 MS
QRS DURATION, ECG06: 86 MS
QRS DURATION, ECG06: 88 MS
QTC CALCULATION (BEZET), ECG08: 457 MS
QTC CALCULATION (BEZET), ECG08: 488 MS
RBC # BLD AUTO: 4.1 M/UL (ref 4.2–5.3)
RBC #/AREA URNS HPF: ABNORMAL /HPF (ref 0–5)
SODIUM SERPL-SCNC: 138 MMOL/L (ref 136–145)
SP GR UR REFRACTOMETRY: >1.03 (ref 1–1.03)
SPECIMEN EXP DATE BLD: NORMAL
TROPONIN-HIGH SENSITIVITY: 6 NG/L (ref 0–54)
TROPONIN-HIGH SENSITIVITY: 6 NG/L (ref 0–54)
UROBILINOGEN UR QL STRIP.AUTO: 1 EU/DL (ref 0.2–1)
VENTRICULAR RATE, ECG03: 61 BPM
VENTRICULAR RATE, ECG03: 72 BPM
WBC # BLD AUTO: 8.4 K/UL (ref 4.6–13.2)
WBC URNS QL MICRO: ABNORMAL /HPF (ref 0–5)
YEAST URNS QL MICRO: ABNORMAL

## 2022-04-25 PROCEDURE — 71275 CT ANGIOGRAPHY CHEST: CPT

## 2022-04-25 PROCEDURE — 86900 BLOOD TYPING SEROLOGIC ABO: CPT

## 2022-04-25 PROCEDURE — 83880 ASSAY OF NATRIURETIC PEPTIDE: CPT

## 2022-04-25 PROCEDURE — 85379 FIBRIN DEGRADATION QUANT: CPT

## 2022-04-25 PROCEDURE — 74011250636 HC RX REV CODE- 250/636: Performed by: ORTHOPAEDIC SURGERY

## 2022-04-25 PROCEDURE — 71045 X-RAY EXAM CHEST 1 VIEW: CPT

## 2022-04-25 PROCEDURE — 80048 BASIC METABOLIC PNL TOTAL CA: CPT

## 2022-04-25 PROCEDURE — 85025 COMPLETE CBC W/AUTO DIFF WBC: CPT

## 2022-04-25 PROCEDURE — 74011250637 HC RX REV CODE- 250/637: Performed by: STUDENT IN AN ORGANIZED HEALTH CARE EDUCATION/TRAINING PROGRAM

## 2022-04-25 PROCEDURE — 93005 ELECTROCARDIOGRAM TRACING: CPT

## 2022-04-25 PROCEDURE — 74011000636 HC RX REV CODE- 636: Performed by: STUDENT IN AN ORGANIZED HEALTH CARE EDUCATION/TRAINING PROGRAM

## 2022-04-25 PROCEDURE — 81001 URINALYSIS AUTO W/SCOPE: CPT

## 2022-04-25 PROCEDURE — 74011250637 HC RX REV CODE- 250/637: Performed by: SPECIALIST

## 2022-04-25 PROCEDURE — 84484 ASSAY OF TROPONIN QUANT: CPT

## 2022-04-25 PROCEDURE — 36415 COLL VENOUS BLD VENIPUNCTURE: CPT

## 2022-04-25 RX ORDER — TRANEXAMIC ACID 10 MG/ML
1 INJECTION, SOLUTION INTRAVENOUS ONCE
Status: DISCONTINUED | OUTPATIENT
Start: 2022-04-25 | End: 2022-04-25 | Stop reason: HOSPADM

## 2022-04-25 RX ORDER — PROCHLORPERAZINE EDISYLATE 5 MG/ML
5 INJECTION INTRAMUSCULAR; INTRAVENOUS ONCE
Status: CANCELLED | OUTPATIENT
Start: 2022-04-25 | End: 2022-04-25

## 2022-04-25 RX ORDER — METOPROLOL TARTRATE 25 MG/1
25 TABLET, FILM COATED ORAL
Status: COMPLETED | OUTPATIENT
Start: 2022-04-25 | End: 2022-04-25

## 2022-04-25 RX ORDER — SODIUM CHLORIDE, SODIUM LACTATE, POTASSIUM CHLORIDE, CALCIUM CHLORIDE 600; 310; 30; 20 MG/100ML; MG/100ML; MG/100ML; MG/100ML
100 INJECTION, SOLUTION INTRAVENOUS CONTINUOUS
Status: CANCELLED | OUTPATIENT
Start: 2022-04-25

## 2022-04-25 RX ORDER — ALBUTEROL SULFATE 0.83 MG/ML
2.5 SOLUTION RESPIRATORY (INHALATION)
Status: CANCELLED | OUTPATIENT
Start: 2022-04-25 | End: 2022-04-26

## 2022-04-25 RX ORDER — CEFAZOLIN SODIUM/WATER 2 G/20 ML
2 SYRINGE (ML) INTRAVENOUS ONCE
Status: DISCONTINUED | OUTPATIENT
Start: 2022-04-25 | End: 2022-04-25 | Stop reason: HOSPADM

## 2022-04-25 RX ORDER — SODIUM CHLORIDE, SODIUM LACTATE, POTASSIUM CHLORIDE, CALCIUM CHLORIDE 600; 310; 30; 20 MG/100ML; MG/100ML; MG/100ML; MG/100ML
125 INJECTION, SOLUTION INTRAVENOUS CONTINUOUS
Status: DISCONTINUED | OUTPATIENT
Start: 2022-04-25 | End: 2022-04-25 | Stop reason: HOSPADM

## 2022-04-25 RX ORDER — SODIUM CHLORIDE 0.9 % (FLUSH) 0.9 %
5-40 SYRINGE (ML) INJECTION AS NEEDED
Status: CANCELLED | OUTPATIENT
Start: 2022-04-25

## 2022-04-25 RX ORDER — FENTANYL CITRATE 50 UG/ML
25 INJECTION, SOLUTION INTRAMUSCULAR; INTRAVENOUS AS NEEDED
Status: CANCELLED | OUTPATIENT
Start: 2022-04-25

## 2022-04-25 RX ORDER — ACETAMINOPHEN 500 MG
500 TABLET ORAL
COMMUNITY
End: 2022-06-20

## 2022-04-25 RX ORDER — HYDROMORPHONE HYDROCHLORIDE 1 MG/ML
0.5 INJECTION, SOLUTION INTRAMUSCULAR; INTRAVENOUS; SUBCUTANEOUS
Status: CANCELLED | OUTPATIENT
Start: 2022-04-25

## 2022-04-25 RX ORDER — DIPHENHYDRAMINE HYDROCHLORIDE 50 MG/ML
12.5 INJECTION, SOLUTION INTRAMUSCULAR; INTRAVENOUS
Status: CANCELLED | OUTPATIENT
Start: 2022-04-25

## 2022-04-25 RX ORDER — GUAIFENESIN 100 MG/5ML
324 LIQUID (ML) ORAL
Status: COMPLETED | OUTPATIENT
Start: 2022-04-25 | End: 2022-04-25

## 2022-04-25 RX ORDER — NALOXONE HYDROCHLORIDE 0.4 MG/ML
0.1 INJECTION, SOLUTION INTRAMUSCULAR; INTRAVENOUS; SUBCUTANEOUS AS NEEDED
Status: CANCELLED | OUTPATIENT
Start: 2022-04-25

## 2022-04-25 RX ORDER — SODIUM CHLORIDE 0.9 % (FLUSH) 0.9 %
5-40 SYRINGE (ML) INJECTION EVERY 8 HOURS
Status: CANCELLED | OUTPATIENT
Start: 2022-04-25

## 2022-04-25 RX ADMIN — SODIUM CHLORIDE, SODIUM LACTATE, POTASSIUM CHLORIDE, AND CALCIUM CHLORIDE 125 ML/HR: 600; 310; 30; 20 INJECTION, SOLUTION INTRAVENOUS at 11:45

## 2022-04-25 RX ADMIN — METOPROLOL TARTRATE 25 MG: 25 TABLET, FILM COATED ORAL at 12:14

## 2022-04-25 RX ADMIN — POTASSIUM BICARBONATE 40 MEQ: 782 TABLET, EFFERVESCENT ORAL at 19:07

## 2022-04-25 RX ADMIN — ASPIRIN 324 MG: 81 TABLET, CHEWABLE ORAL at 17:15

## 2022-04-25 RX ADMIN — IOPAMIDOL 100 ML: 755 INJECTION, SOLUTION INTRAVENOUS at 19:13

## 2022-04-25 NOTE — DISCHARGE INSTRUCTIONS
Please carefully read all discharge instructions    Please follow-up with a primary care physician and if you do not have one currently use the contact information provided to obtain an appointment. If none was provided please call the number on the back of your insurance card to locate a Primary care doctor. Many offices have \"cancellation lists\" that you can ask to be placed on; should a patient with an earlier appointment cancel you will be notified to take their place. Please return to the Emergency Room immediately if your symptoms worsen. Please return to the Emergency Department if you develop a fever, chills, cannot eat or drink due to nausea or vomiting, or if any of your symptoms worsen. If you do not have insurance you can use the below for your medications. [email or web address]    What are GoodRx coupons? GoodRx coupons will help you pay less than the cash price for your prescription. Dee Elder free to use and are accepted at virtually every [place] pharmacy. Your pharmacist will know how to enter the codes on the coupon to pull up the lowest discount available. [employer] Activation    Thank you for requesting access to [employer]. Please follow the instructions below to securely access and download your online medical record. [employer] allows you to send messages to your doctor, view your test results, renew your prescriptions, schedule appointments, and more. How Do I Sign Up? In your internet browser, go to [email or web address]  Click on the First Time User? Click Here link in the Sign In box. You will be redirect to the New Member Sign Up page. Enter your [employer] Access Code exactly as it appears below. You will not need to use this code after youve completed the sign-up process. If you do not sign up before the expiration date, you must request a new code.     [employer] Access Code: [email or web address]  Expires: 5/8/2022  8:03 AM    Enter the last four digits of your Social Security Number (xxxx) and Date of Birth (mm/dd/yyyy) as indicated and click Submit. You will be taken to the next sign-up page. Create a [employer] ID. This will be your [employer] login ID and cannot be changed, so think of one that is secure and easy to remember. Create a [employer] password. You can change your password at any time. Enter your Password Reset Question and Answer. This can be used at a later time if you forget your password. Enter your e-mail address. You will receive e-mail notification when new information is available in [address] Click Sign Up. You can now view and download portions of your medical record. Click the [employer] link to download a portable copy of your medical information. Additional Information    If you have questions, please visit the Frequently Asked Questions section of the [employer] website at [email or web address] Remember, [employer] is NOT to be used for urgent needs. For medical emergencies, dial 911.

## 2022-04-25 NOTE — INTERVAL H&P NOTE
Update History & Physical    The Patient's History and Physical of April 25, 2022 was reviewed with the patient and I examined the patient. There was no change. The surgical site was confirmed by the patient and me. Plan:  The risk, benefits, expected outcome, and alternative to the recommended procedure have been discussed with the patient. Patient understands and wants to proceed with the procedure.     Electronically signed by Александр De Luna MD on 4/25/2022 at 2:51 PM

## 2022-04-25 NOTE — NURSE NAVIGATOR
4/23/2022 Jeronimo Li watched the pre op seminar online and received a preoperative education booklet in anticipation of surgery    Nurse Navigator

## 2022-04-25 NOTE — ED NOTES
Late Entry:    Introduced self to pt  Pt placed on full cardiac monitor  VS updated  Pt denies any pain at this time    EKG completed  Blood collected and walked to lab

## 2022-04-25 NOTE — ED TRIAGE NOTES
Sent from pre-op holding for irregular heart rhythm. Patient reports she stopped her Eloquis and Metoprolol last week for hip replacement surgery today.

## 2022-04-25 NOTE — PERIOP NOTES
Reviewed PTA medication list with patient/caregiver and patient/caregiver denies any additional medications. Patient admits to having a responsible adult care for them at home for at least 24 hours after surgery. Patient encouraged to use gown warming system and informed that using said warming gown to regulate body temperature prior to a procedure has been shown to help reduce the risks of blood clots and infection. Patient's pharmacy of choice verified and documented in PTA medication section. Dual skin assessment & fall risk band verification completed with Cherelle Allen RN.

## 2022-04-25 NOTE — PROGRESS NOTES
Pt is a 59 yo female scheduled for a Right GENI today. 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 Pt accidentally stopped her metoprolol a week ago thinking it was a \"blood thinner\" states that elaquis was also held a week ago. She describes having GUTIERREZ and chest tightness especially with activities for the past week since stopping the BB. BP and HR were elevated today. I do not think she is optimized, and potentially has signs of ischemia. I spoke with her cardiologist Dr. Savannah Man who recommended her to bee seen in ED.     Ronn Huber, GANGA VALDEZ

## 2022-04-25 NOTE — ED NOTES
Repeat EKG/Troponin/UA collected per MD order. Pt endorses, \"feeling great\" and is able to ambulate well without assistance from gurney to restroom. Denies any new sx at this time. On full cardiac monitor, call light within reach.

## 2022-04-26 NOTE — ED PROVIDER NOTES
EMERGENCY DEPARTMENT HISTORY AND PHYSICAL EXAM      Date: 4/25/2022  Patient Name: Alfonzo Kraus    History of Presenting Illness     Chief Complaint   Patient presents with    Irregular Heart Beat       HPI:  Alfonzo Kraus is a 58 y.o. female with a plan for right GENI today, during pre-op noted to have history of recent chest pain, palpitations    A-fib induced cardiomyopathy in August 2021, converted to SR, was placed on Elaquis and metoprolol. Symptoms waxed and waned and has not had some today or yesterday. She inadvertently stopped her metoprolol a week ago thinking it was a \"blood thinner\" states that elaquis was also held a week ago. She describes having GUTIERREZ and chest tightness especially with activities for the past week since stopping the BB. She stated that walking up stairs or working outside increased symptoms. BP and HR were elevated today. Anesthesiologist spoke with Dr. Delaney Celestin who recommended her to bee seen in ED. On review of systems, the patient denies fever, chills, trauma, back pain, abdominal pain, nausea, vomiting, diaphoresis, loss of consciousness. PCP: Shiva Son, DO    Current Outpatient Medications   Medication Sig Dispense Refill    acetaminophen (Tylenol Extra Strength) 500 mg tablet Take  by mouth every six (6) hours as needed for Pain.  traZODone (DESYREL) 50 mg tablet Take  by mouth nightly.  hydroCHLOROthiazide (HYDRODIURIL) 25 mg tablet Take 25 mg by mouth daily.  gabapentin (NEURONTIN) 300 mg capsule Take 300 mg by mouth three (3) times daily.  buPROPion XL (WELLBUTRIN XL) 150 mg tablet Take 150 mg by mouth daily.  omeprazole (PRILOSEC) 20 mg capsule Take 20 mg by mouth daily.  losartan (COZAAR) 50 mg tablet Take  by mouth daily.  Desvenlafaxine 100 mg Tb24 Take  by mouth.  ergocalciferol, vitamin d2, 10 mcg (400 unit) tab Take 1 Tablet by mouth daily.       omega 3-dha-epa-fish oil (Fish OiL) 100-160-1,000 mg cap This problem is currently not controlled. Please follow up with your PCP as planned to discuss adjustments to your treatment plan.  The patient is asked to make an attempt to improve diet and exercise patterns to aid in medical management of this problem.  She will switch from salt to Mrs. Dash. Will add HCTZ 12.5 mg to her regimen. She is to follow up in 2 weeks for a nurse visit and bring in BP machine.   Take  by mouth.  citalopram (CELEXA) 10 mg tablet Take 1 Tablet by mouth daily. 30 Tablet 0    apixaban (ELIQUIS) 5 mg tablet Take 1 Tablet by mouth two (2) times a day. 60 Tablet 2    metoprolol tartrate (LOPRESSOR) 25 mg tablet Take 1 Tablet by mouth every twelve (12) hours. 61 Tablet 2       Past History     Past Medical History:  Past Medical History:   Diagnosis Date    A-fib ([facility]     Acid reflux     Arthritis     Back pain     Fibromyalgia     Fibromyalgia     Hypertension     Neuropathy     [facility]        Past Surgical History:  Past Surgical History:   Procedure Laterality Date    HX HERNIA REPAIR      HX HERNIA REPAIR      HX HERNIA REPAIR         Family History:  History reviewed. No pertinent family history. Social History:  Social History     Tobacco Use    Smoking status: Former Smoker    Smokeless tobacco: Never Used   Substance Use Topics    Alcohol use: Never    Drug use: Yes     Types: Marijuana       Allergies: Allergies   Allergen Reactions    Sulfa (Sulfonamide Antibiotics) Hives       PMH, PSH, family history, social history, allergies reviewed with the patient with significant items noted above. Review of Systems   As per HPI, otherwise reviewed and negative.      Physical Exam     Vitals:    04/25/22 1633 04/25/22 1648 04/25/22 1656 04/25/22 2103   BP: 92/73  107/66    Pulse: 80 67 71    Resp: 11 17 15    SpO2: 100% 98% 97% 98%   Weight:  88 kg (194 lb)     Height:  5' 4\" (1.626 m)         Vital signs wdl  General: Patient is awake and alert, resting comfortably in no acute distress  Head: Normocephalic and atraumatic  Eyes: Extraocular muscles intact, no conjunctival pallor  Ear, nose, throat: Normal external exam  Neck: Normal range of motion  Cardiovascular: RRR, warm, well-perfused extremities  Respiratory: Patient is in no respiratory distress, normal respiratory effort  GI: soft, non-tender, non-distended  MSK: No gross deformities appreciated  Extremities: pulses intact with good cap refills, no LE pitting edema or calf tenderness  Skin: Warm, dry, and intact  Neuro: The patient is alert and oriented, no gross motor or sensory defects noted. Psych: Appropriate mood and affect. Diagnostic Study Results     Labs -   No results found for this or any previous visit (from the past 12 hour(s)). Radiologic Studies -   CTA CHEST W OR W WO CONT   Final Result      1. No evidence of pulmonary embolism. 2.  Old granulomatous disease. 3. Scattered coronary arterial calcific atherosclerosis. XR CHEST PORT   Final Result      No acute findings in the chest.         CT Results  (Last 48 hours)    None        CXR Results  (Last 48 hours)    None            Medical Decision Making   I am the first provider for this patient. I reviewed the vital signs, available nursing notes, past medical history, past surgical history, family history and social history. Vital Signs-Reviewed the patient's vital signs. EKG: Interpreted by myself. EKG non diagnostic, without acute ST elevation, arrhythmia, prolonged QT, or evidence of Brugada       Records Reviewed: Personally, on initial evaluation    MDM:   Well appearing, nontoxic, concerning though for chest pain in context of risk factors/cardiac history. EKG reassuring, enzymes negative, CXR unrevealing without PTX, new infiltrate or other acute process. Doubt dissection based on history, exam, and imaging, doubt PE as well in this patient, also not hypoxic or tachycardic. I think the most prudent course in this case would be ed observation with serial enzymes and EKGs    I discussed with this the patient who agrees. CP is not a tearing sensation, there is no radiation to the back, no neurologic abnormalities, no widened mediastinum, no new murmur, and distal pulses are strong and symmetrical.  Clinically doubt aortic dissection.     Given ASA, on cardiac monitor  Will obtain D-Dimer  Operation has been pulled from plan. All okay with asa    Plan:     Close Observation  Cardiac monitoring  As per orders noted below:  Orders Placed This Encounter    XR CHEST PORT    CTA CHEST W OR W WO CONT    CBC WITH AUTOMATED DIFF    BASIC METABOLIC PANEL    TROPONIN-HIGH SENSITIVITY    PRO-BNP    URINALYSIS W/ RFLX MICROSCOPIC    D DIMER    TROPONIN-HIGH SENSITIVITY    URINE MICROSCOPIC ONLY    EKG, 12 LEAD, INITIAL    EKG, 12 LEAD, SUBSEQUENT    aspirin chewable tablet 324 mg    potassium bicarb-citric acid (EFFER-K) tablet 40 mEq    iopamidoL (ISOVUE-370) 76 % injection 100 mL          ED Course:   ED Course as of 05/03/22 2249   Mon Apr 25, 2022   1645 Auther Casey, DO, MAS [DM]   1721 D-dimer elevated, will CTA. [DM]   5994 Chest xray without evidence of pneumothorax, pneumonia, or other acute pathology. Troponin normal  BNP normal [DM]   1837 Giving potassium. [DM]   1903 Spoke to Dr. Caroline Rivera, agrees patient can go home if CTA negative, follow up with Dr. Antony Espinosa, for outpatient op clearance. [DM]      ED Course User Index  [DM] Kathleen Mcguire MD        Workup unremarkable  Based on their history, EKG (which showed no evidence of ischemia or infarction) and imaging, in addition to the patient's physical exam, I see no evidence at this time for a malignant etiology for the patient's chest pain. There is no acute evidence for pulmonary embolus, acute myocardial infarction, pneumothorax, Boerhaeve syndrome, cardiac tamponade, thoracic artery dissection, or any other emergent cardiac, pulmonary or aortic pathology.            Vitals Review/addressed -     Diagnostic Study Results     Orders Placed This Encounter    XR CHEST PORT     Standing Status:   Standing     Number of Occurrences:   1     Order Specific Question:   Reason for Exam     Answer:   chest pain    CTA CHEST W OR W WO CONT     Standing Status:   Standing     Number of Occurrences:   1 Order Specific Question:   Transport     Answer:   Ambulatory [1]     Order Specific Question:   Reason for Exam     Answer:   shortness of breath     Order Specific Question:   Does patient have history of Renal Disease? Answer:   No     Order Specific Question:   Decision Support Exception     Answer:   Emergency Medical Condition (MA) [1]    CBC WITH AUTOMATED DIFF     Standing Status:   Standing     Number of Occurrences:   1    BASIC METABOLIC PANEL     Standing Status:   Standing     Number of Occurrences:   1    TROPONIN-HIGH SENSITIVITY     Standing Status:   Standing     Number of Occurrences:   1    PRO-BNP     Standing Status:   Standing     Number of Occurrences:   1    URINALYSIS W/ RFLX MICROSCOPIC     Standing Status:   Standing     Number of Occurrences:   1    D DIMER     Standing Status:   Standing     Number of Occurrences:   1    TROPONIN-HIGH SENSITIVITY     Standing Status:   Standing     Number of Occurrences:   1    URINE MICROSCOPIC ONLY     Standing Status:   Standing     Number of Occurrences:   1    EKG, 12 LEAD, INITIAL     Standing Status:   Standing     Number of Occurrences:   1     Order Specific Question:   Reason for Exam:     Answer:   chest pain    EKG, 12 LEAD, SUBSEQUENT     Standing Status:   Standing     Number of Occurrences:   1     Order Specific Question:   Reason for Exam:     Answer:   atypical chest pressure    aspirin chewable tablet 324 mg    potassium bicarb-citric acid (EFFER-K) tablet 40 mEq    iopamidoL (ISOVUE-370) 76 % injection 100 mL       Labs -   No results found for this or any previous visit (from the past 12 hour(s)). Radiologic Studies -   CTA CHEST W OR W WO CONT   Final Result      1. No evidence of pulmonary embolism. 2.  Old granulomatous disease. 3. Scattered coronary arterial calcific atherosclerosis.          XR CHEST PORT   Final Result      No acute findings in the chest.         CT Results  (Last 48 hours)    None CXR Results  (Last 48 hours)    None          Disposition     Disposition:  Home with follow up    CLINICAL IMPRESSION:    1. Dyspnea on exertion    2. Chest pressure    3. Hypokalemia    4. History of atrial fibrillation    5. Benign essential hypertension        It should be noted that I will be the provider of record for this patient  Joanna Jiménez MD    Follow-up Information     Follow up With Specialties Details Why [address]    THE [facility] EMERGENCY DEPT Emergency Medicine Go to  If symptoms worsen [address] [place] [id number]  [address], [facility],  Internal Medicine   [address] [place] [address]  [phone]      Marco Antonio Tracy MD Cardiology, Internal Medicine   [address]  [address] [place] [address]  [phone]            Discharge Medication List as of 4/25/2022  8:45 PM          Please note that this dictation was completed with [employer], the computer voice recognition software. Quite often unanticipated grammatical, syntax, homophones, and other interpretive errors are inadvertently transcribed by the computer software. Please disregard these errors. Please excuse any errors that have escaped final proofreading.

## 2022-05-09 ENCOUNTER — APPOINTMENT (OUTPATIENT)
Dept: GENERAL RADIOLOGY | Age: 62
End: 2022-05-09
Attending: EMERGENCY MEDICINE
Payer: MEDICARE

## 2022-05-09 ENCOUNTER — HOSPITAL ENCOUNTER (OUTPATIENT)
Age: 62
Setting detail: OBSERVATION
Discharge: HOME | End: 2022-05-10
Attending: EMERGENCY MEDICINE | Admitting: HOSPITALIST
Payer: MEDICARE

## 2022-05-09 DIAGNOSIS — R07.89 CHEST PRESSURE: Primary | ICD-10-CM

## 2022-05-09 PROBLEM — I50.22 SYSTOLIC CHF, CHRONIC (HCC): Status: ACTIVE | Noted: 2022-05-09

## 2022-05-09 PROBLEM — I48.0 PAF (PAROXYSMAL ATRIAL FIBRILLATION) (HCC): Status: ACTIVE | Noted: 2022-05-09

## 2022-05-09 LAB
ALBUMIN SERPL-MCNC: 4 G/DL (ref 3.4–5)
ALBUMIN/GLOB SERPL: 1.1 {RATIO} (ref 0.8–1.7)
ALP SERPL-CCNC: 97 U/L (ref 45–117)
ALT SERPL-CCNC: 19 U/L (ref 13–56)
ANION GAP SERPL CALC-SCNC: 5 MMOL/L (ref 3–18)
APTT PPP: 28.7 SEC (ref 23–36.4)
AST SERPL-CCNC: 11 U/L (ref 10–38)
BASOPHILS # BLD: 0 K/UL (ref 0–0.1)
BASOPHILS NFR BLD: 0 % (ref 0–2)
BILIRUB SERPL-MCNC: 0.3 MG/DL (ref 0.2–1)
BNP SERPL-MCNC: 94 PG/ML (ref 0–900)
BUN SERPL-MCNC: 26 MG/DL (ref 7–18)
BUN/CREAT SERPL: 25 (ref 12–20)
CALCIUM SERPL-MCNC: 9.6 MG/DL (ref 8.5–10.1)
CHLORIDE SERPL-SCNC: 106 MMOL/L (ref 100–111)
CO2 SERPL-SCNC: 30 MMOL/L (ref 21–32)
COVID-19 RAPID TEST, COVR: NOT DETECTED
CREAT SERPL-MCNC: 1.04 MG/DL (ref 0.6–1.3)
D DIMER PPP FEU-MCNC: 0.5 UG/ML(FEU)
DIFFERENTIAL METHOD BLD: ABNORMAL
EOSINOPHIL # BLD: 0.2 K/UL (ref 0–0.4)
EOSINOPHIL NFR BLD: 2 % (ref 0–5)
ERYTHROCYTE [DISTWIDTH] IN BLOOD BY AUTOMATED COUNT: 12.9 % (ref 11.6–14.5)
GLOBULIN SER CALC-MCNC: 3.8 G/DL (ref 2–4)
GLUCOSE SERPL-MCNC: 119 MG/DL (ref 74–99)
HCT VFR BLD AUTO: 42 % (ref 35–45)
HGB BLD-MCNC: 13.8 G/DL (ref 12–16)
IMM GRANULOCYTES # BLD AUTO: 0 K/UL (ref 0–0.04)
IMM GRANULOCYTES NFR BLD AUTO: 0 % (ref 0–0.5)
INR PPP: 1 (ref 0.8–1.2)
LYMPHOCYTES # BLD: 2.9 K/UL (ref 0.9–3.6)
LYMPHOCYTES NFR BLD: 30 % (ref 21–52)
MCH RBC QN AUTO: 31.9 PG (ref 24–34)
MCHC RBC AUTO-ENTMCNC: 32.9 G/DL (ref 31–37)
MCV RBC AUTO: 97.2 FL (ref 78–100)
MONOCYTES # BLD: 0.5 K/UL (ref 0.05–1.2)
MONOCYTES NFR BLD: 5 % (ref 3–10)
NEUTS SEG # BLD: 6 K/UL (ref 1.8–8)
NEUTS SEG NFR BLD: 63 % (ref 40–73)
NRBC # BLD: 0 K/UL (ref 0–0.01)
NRBC BLD-RTO: 0 PER 100 WBC
PLATELET # BLD AUTO: 177 K/UL (ref 135–420)
PMV BLD AUTO: 13.5 FL (ref 9.2–11.8)
POTASSIUM SERPL-SCNC: 3.7 MMOL/L (ref 3.5–5.5)
PROT SERPL-MCNC: 7.8 G/DL (ref 6.4–8.2)
PROTHROMBIN TIME: 13.7 SEC (ref 11.5–15.2)
RBC # BLD AUTO: 4.32 M/UL (ref 4.2–5.3)
SODIUM SERPL-SCNC: 141 MMOL/L (ref 136–145)
SOURCE, COVRS: NORMAL
TROPONIN-HIGH SENSITIVITY: 6 NG/L (ref 0–54)
TROPONIN-HIGH SENSITIVITY: 8 NG/L (ref 0–54)
TSH SERPL DL<=0.05 MIU/L-ACNC: 0.97 UIU/ML (ref 0.36–3.74)
WBC # BLD AUTO: 9.6 K/UL (ref 4.6–13.2)

## 2022-05-09 PROCEDURE — 83880 ASSAY OF NATRIURETIC PEPTIDE: CPT

## 2022-05-09 PROCEDURE — 85025 COMPLETE CBC W/AUTO DIFF WBC: CPT

## 2022-05-09 PROCEDURE — 80053 COMPREHEN METABOLIC PANEL: CPT

## 2022-05-09 PROCEDURE — G0378 HOSPITAL OBSERVATION PER HR: HCPCS

## 2022-05-09 PROCEDURE — 87635 SARS-COV-2 COVID-19 AMP PRB: CPT

## 2022-05-09 PROCEDURE — 93005 ELECTROCARDIOGRAM TRACING: CPT

## 2022-05-09 PROCEDURE — 99285 EMERGENCY DEPT VISIT HI MDM: CPT

## 2022-05-09 PROCEDURE — 84443 ASSAY THYROID STIM HORMONE: CPT

## 2022-05-09 PROCEDURE — 85610 PROTHROMBIN TIME: CPT

## 2022-05-09 PROCEDURE — 84484 ASSAY OF TROPONIN QUANT: CPT

## 2022-05-09 PROCEDURE — 74011250637 HC RX REV CODE- 250/637: Performed by: HOSPITALIST

## 2022-05-09 PROCEDURE — 74011250637 HC RX REV CODE- 250/637: Performed by: FAMILY MEDICINE

## 2022-05-09 PROCEDURE — 85730 THROMBOPLASTIN TIME PARTIAL: CPT

## 2022-05-09 PROCEDURE — 85379 FIBRIN DEGRADATION QUANT: CPT

## 2022-05-09 PROCEDURE — 94762 N-INVAS EAR/PLS OXIMTRY CONT: CPT

## 2022-05-09 PROCEDURE — 71045 X-RAY EXAM CHEST 1 VIEW: CPT

## 2022-05-09 RX ORDER — ACETAMINOPHEN 325 MG/1
650 TABLET ORAL
Status: DISCONTINUED | OUTPATIENT
Start: 2022-05-09 | End: 2022-05-10 | Stop reason: HOSPADM

## 2022-05-09 RX ORDER — FUROSEMIDE 10 MG/ML
40 INJECTION INTRAMUSCULAR; INTRAVENOUS DAILY
Status: DISCONTINUED | OUTPATIENT
Start: 2022-05-09 | End: 2022-05-10 | Stop reason: HOSPADM

## 2022-05-09 RX ORDER — GABAPENTIN 300 MG/1
300 CAPSULE ORAL 3 TIMES DAILY
Status: DISCONTINUED | OUTPATIENT
Start: 2022-05-09 | End: 2022-05-10 | Stop reason: HOSPADM

## 2022-05-09 RX ORDER — HYDROCHLOROTHIAZIDE 25 MG/1
25 TABLET ORAL DAILY
Status: DISCONTINUED | OUTPATIENT
Start: 2022-05-10 | End: 2022-05-10 | Stop reason: HOSPADM

## 2022-05-09 RX ORDER — METOPROLOL TARTRATE 25 MG/1
25 TABLET, FILM COATED ORAL EVERY 12 HOURS
Status: DISCONTINUED | OUTPATIENT
Start: 2022-05-09 | End: 2022-05-10 | Stop reason: HOSPADM

## 2022-05-09 RX ORDER — BUPROPION HYDROCHLORIDE 150 MG/1
150 TABLET ORAL DAILY
Status: DISCONTINUED | OUTPATIENT
Start: 2022-05-10 | End: 2022-05-10 | Stop reason: HOSPADM

## 2022-05-09 RX ORDER — PANTOPRAZOLE SODIUM 40 MG/1
40 TABLET, DELAYED RELEASE ORAL
Status: DISCONTINUED | OUTPATIENT
Start: 2022-05-10 | End: 2022-05-10 | Stop reason: HOSPADM

## 2022-05-09 RX ORDER — DESVENLAFAXINE 100 MG/1
100 TABLET, EXTENDED RELEASE ORAL DAILY
Status: DISCONTINUED | OUTPATIENT
Start: 2022-05-10 | End: 2022-05-10 | Stop reason: HOSPADM

## 2022-05-09 RX ORDER — LOSARTAN POTASSIUM 50 MG/1
50 TABLET ORAL DAILY
Status: DISCONTINUED | OUTPATIENT
Start: 2022-05-10 | End: 2022-05-10 | Stop reason: HOSPADM

## 2022-05-09 RX ADMIN — METOPROLOL TARTRATE 25 MG: 25 TABLET, FILM COATED ORAL at 21:27

## 2022-05-09 RX ADMIN — ACETAMINOPHEN 650 MG: 325 TABLET ORAL at 21:27

## 2022-05-09 RX ADMIN — APIXABAN 5 MG: 5 TABLET, FILM COATED ORAL at 21:27

## 2022-05-09 NOTE — ED TRIAGE NOTES
Pt arrives ambulatory to ED with c\o CP, SOB, and general malaise x 3 weeks, pt sts she feels like something is heavy on her chest, pt sts she was seen at [facility] and had a negative cardiac work up, pt with persistent CP/SOB, pt sts she was to have RIGHT hip replaced and stopped taking her eliquis 3 weeks ago and restarted 3 days ago

## 2022-05-09 NOTE — ROUTINE PROCESS
Bedside and Verbal shift change report given to [place] (oncoming nurse) by Frank Ribeiro RN (offgoing nurse). Report included the following information SBAR, Kardex, MAR and Recent Results.

## 2022-05-09 NOTE — H&P
History & Physical    Patient: Ion Pereira MRN: [id number]  CSN: [id number]    YOB: 1960  Age: 58 y.o. 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 Cardiology consulted by ER    Chronic systolic CHF -  Started on diuresis  Continue with ARB and betablocker  Echo     PAF -  On betablocker  NSR  Anticoagulation with eliquis. HTN -  Continue home meds,   Monitor BP    GERD -  On PPI    Estimated length of stay : 1-2 days    CC: chest pressure, GUTIERREZ       HPI:     Ion Pereira is a 58 y.o. female with paroxysmal atrial fibrillation, neuropathy, chronic systolic CHF, fibromyalgia, hypertension presents to ER with concerns of chest pressure and dyspnea on exertion. Patient reports that she has been having on and off chest pressure and dyspnea on exertion for the past 2 to 3 weeks. She reports that her symptoms has been progressively getting worse. She reported that she was supposed to have hip surgery on April 25 and this was not done due to her cardiac condition. She reports that she was told to stop medication and she was not taking her medications. She recently started taking her beta-blocker and Eliquis. She also reported that she stopped taking her antidepressants. Which she started 3 to 4 days ago.   She denies any fever, chills, headache, nausea, vomiting. In ER her EKG showed normal sinus rhythm with no ST-T wave changes. Her high-sensitivity troponin in normal range x2. Given her symptoms cardiology recommended admission. Past Medical History:   Diagnosis Date    [facility]fib ([facility]     Acid reflux     Arthritis     Back pain     Fibromyalgia     Fibromyalgia     Hypertension     Neuropathy     Sepsis ([facility]        Past Surgical History:   Procedure Laterality Date    HX HERNIA REPAIR      HX HERNIA REPAIR      HX HERNIA REPAIR         No family history on file. Social History     Socioeconomic History    Marital status:    Tobacco Use    Smoking status: Former Smoker    Smokeless tobacco: Never Used   Substance and Sexual Activity    Alcohol use: Never    Drug use: Yes     Types: Marijuana       Prior to Admission medications    Medication Sig Start Date End Date Taking? Authorizing Provider   omeprazole (PRILOSEC) 20 mg capsule Take 20 mg by mouth daily. Yes Provider, Historical   losartan (COZAAR) 50 mg tablet Take  by mouth daily. Yes Provider, Historical   omega 3-dha-epa-fish oil (Fish OiL) 100-160-1,000 mg cap Take  by mouth. Yes Provider, Historical   acetaminophen (Tylenol Extra Strength) 500 mg tablet Take  by mouth every six (6) hours as needed for Pain. Provider, Historical   traZODone (DESYREL) 50 mg tablet Take  by mouth nightly. Patient not taking: Reported on 5/9/2022    Provider, Historical   hydroCHLOROthiazide (HYDRODIURIL) 25 mg tablet Take 25 mg by mouth daily. Provider, Historical   gabapentin (NEURONTIN) 300 mg capsule Take 300 mg by mouth three (3) times daily. Provider, Historical   buPROPion XL (WELLBUTRIN XL) 150 mg tablet Take 150 mg by mouth daily. Provider, Historical   Desvenlafaxine 100 mg Tb24 Take  by mouth. Provider, Historical   ergocalciferol, vitamin d2, 10 mcg (400 unit) tab Take 1 Tablet by mouth daily.     Provider, Historical citalopram (CELEXA) 10 mg tablet Take 1 Tablet by mouth daily. Patient not taking: Reported on 2022   Doug Hua MD   apixaban (ELIQUIS) 5 mg tablet Take 1 Tablet by mouth two (2) times a day. 21   Doug Hua MD   metoprolol tartrate (LOPRESSOR) 25 mg tablet Take 1 Tablet by mouth every twelve (12) hours. 21   Doug Hua MD       Allergies   Allergen Reactions    Sulfa (Sulfonamide Antibiotics) Hives       Review of Systems  Gen: No fever, chills, malaise, weight loss/gain. Heent: No headache, rhinorrhea, epistaxis, ear pain, hearing loss, sinus pain, neck pain/stiffness, sore throat. Heart: see above   Resp: No cough, hemoptysis, wheezing and shortness of breath. GI: No nausea, vomiting, diarrhea, constipation, melena or hematochezia. : No urinary obstruction, dysuria or hematuria. Derm: No rash, new skin lesion or pruritis. Musc/skeletal: no bone or joint complains. Vasc: No edema, cyanosis or claudication. Endo: No heat/cold intolerance, no polyuria,polydipsia or polyphagia. Neuro: No unilateral weakness, numbness, tingling. No seizures. Heme: No easy bruising or bleeding. Physical Exam:     Physical Exam:  Visit Vitals  /89   Pulse 65   Temp 97.1 °F (36.2 °C)   Resp 17   Ht 5' 4\" (1.626 m)   Wt 88.5 kg (195 lb)   SpO2 99%   BMI 33.47 kg/m²           Temp (24hrs), Av.1 °F (36.2 °C), Min:97.1 °F (36.2 °C), Max:97.1 °F (36.2 °C)    No intake/output data recorded. No intake/output data recorded. General:  Awake, cooperative, no distress. Head:  Normocephalic, without obvious abnormality, atraumatic. Eyes:  Conjunctivae/corneas clear, sclera anicteric, PERRL, EOMs intact. Nose: Nares normal. No drainage or sinus tenderness. Throat: Lips, mucosa, and tongue normal.    Neck: Supple, symmetrical, trachea midline, no adenopathy. Lungs:   Clear to auscultation bilaterally.        Heart:   S1, S2, no murmur, click, rub or gallop. Abdomen: Soft, non-tender. Bowel sounds normal. No masses,  No organomegaly. Extremities: Extremities normal, atraumatic, no cyanosis or edema. Capillary refill normal.   Pulses: 2+ and symmetric all extremities. Skin: Skin color pink, turgor normal. No rashes or lesions   Neurologic: CNII-XII intact. No focal motor or sensory deficit. Labs Reviewed:    CMP:   Lab Results   Component Value Date/Time     05/09/2022 10:50 AM    K 3.7 05/09/2022 10:50 AM     05/09/2022 10:50 AM    CO2 30 05/09/2022 10:50 AM    AGAP 5 05/09/2022 10:50 AM     (H) 05/09/2022 10:50 AM    BUN 26 (H) 05/09/2022 10:50 AM    CREA 1.04 05/09/2022 10:50 AM    GFRAA >60 05/09/2022 10:50 AM    GFRNA 54 (L) 05/09/2022 10:50 AM    CA 9.6 05/09/2022 10:50 AM    ALB 4.0 05/09/2022 10:50 AM    TP 7.8 05/09/2022 10:50 AM    GLOB 3.8 05/09/2022 10:50 AM    AGRAT 1.1 05/09/2022 10:50 AM    ALT 19 05/09/2022 10:50 AM     CBC:   Lab Results   Component Value Date/Time    WBC 9.6 05/09/2022 10:50 AM    HGB 13.8 05/09/2022 10:50 AM    HCT 42.0 05/09/2022 10:50 AM     05/09/2022 10:50 AM     All Cardiac Markers in the last 24 hours: No results found for: CPK, CK, CKMMB, CKMB, RCK3, CKMBT, CKNDX, CKND1, SALONI, TROPT, TROIQ, ADAN, TROPT, TNIPOC, BNP, BNPP      Procedures/imaging: see electronic medical records for all procedures/Xrays and details which were not copied into this note but were reviewed prior to creation of Plan    Please note that this dictation was completed with [employer], the [employer] voice recognition software. Quite often unanticipated grammatical, syntax, homophones, and other interpretive errors are inadvertently transcribed by the computer software. Please disregard these errors. Please excuse any errors that have escaped final proofreading.         CC: Leyla Anton, DO

## 2022-05-09 NOTE — CONSULTS
[facility] Consult Note      Patient: Sea Nuñez MRN: [id number]  SSN: [id number]    YOB: 1960  Age: 58 y.o. Sex: female        Date of Consultation: 5/9/2022  Referring Physician: Dr. Apolonia Carbone  Reason for Consultation: Chest pain    HPI:  I was asked by  to see this pleasant patient for chest pain with a history of paroxysmal atrial fibrillation. Vidya Chisholm I have Lonsteffanie Back is a 71-year-old patient with a history of paroxysmal atrial fibrillation on metoprolol and Eliquis. Patient patient stopped taking most of her medication before hip surgery including metoprolol and anticoagulation and antidepression medicine. Surgery was canceled now patient has restarted her metoprolol and Eliquis. She came to the hospital with intermittent chest pain and mild shortness of breath fatigue and lack of energy. Patient is admitted in the hospital for further ischemic testing.     Past Medical History:   Diagnosis Date    A-fib ([facility] [address]     Acid reflux     Arthritis     Back pain     Chronic systolic CHF (congestive heart failure) (HCC)     Fibromyalgia     Fibromyalgia     Hypertension     Neuropathy     Sepsis (HCC)      Past Surgical History:   Procedure Laterality Date    HX HERNIA REPAIR      HX HERNIA REPAIR      HX HERNIA REPAIR       Current Facility-Administered Medications   Medication Dose Route Frequency    apixaban (ELIQUIS) tablet 5 mg  5 mg Oral BID    [START ON 5/10/2022] buPROPion XL (WELLBUTRIN XL) tablet 150 mg  150 mg Oral DAILY    [START ON 5/10/2022] desvenlafaxine Tb24 100 mg (Patient Supplied)  100 mg Oral DAILY    gabapentin (NEURONTIN) capsule 300 mg  300 mg Oral TID    [START ON 5/10/2022] hydroCHLOROthiazide (HYDRODIURIL) tablet 25 mg  25 mg Oral DAILY    [START ON 5/10/2022] losartan (COZAAR) tablet 50 mg  50 mg Oral DAILY    metoprolol tartrate (LOPRESSOR) tablet 25 mg  25 mg Oral Q12H    [START ON 5/10/2022] pantoprazole (PROTONIX) tablet 40 mg  40 mg Oral ACB    furosemide (LASIX) injection 40 mg  40 mg IntraVENous DAILY       Allergies and Intolerances: Allergies   Allergen Reactions    Sulfa (Sulfonamide Antibiotics) Hives       Family History:   No family history on file. Social History:   She  reports that she has quit smoking. She has never used smokeless tobacco.  She  reports no history of alcohol use. Review of Systems  Gen: No fever, chills, malaise, weight loss/gain. Heent: No headache, rhinorrhea, epistaxis, ear pain, hearing loss, sinus pain, neck pain/stiffness, sore throat. Heart: + chest pain, palpitations, GUTIERREZ, pnd, or orthopnea. Resp: No cough, hemoptysis, wheezing and +shortness of breath. GI: No nausea, vomiting, diarrhea, constipation, melena or hematochezia. : No urinary obstruction, dysuria or hematuria. Derm: No rash, new skin lesion or pruritis. Musc/skeletal: no bone or joint complains. Vasc: No edema, cyanosis or claudication. Endo: No heat/cold intolerance, no polyuria,polydipsia or polyphagia. Neuro: No unilateral weakness, numbness, tingling. No seizures. Heme: No easy bruising or bleeding. Physical:   Patient Vitals for the past 6 hrs:   Pulse Resp BP SpO2   05/09/22 1822 -- -- -- 99 %   05/09/22 1553 65 17 108/89 99 %   05/09/22 1538 (!) 56 14 114/64 100 %   05/09/22 1523 61 13 117/68 98 %   05/09/22 1459 66 11 111/62 93 %   05/09/22 1444 -- 15 103/66 98 %   05/09/22 1429 (!) 54 8 (!) 103/53 98 %   05/09/22 1414 (!) 59 17 106/67 98 %   05/09/22 1359 (!) 58 (!) 7 (!) 107/57 100 %   05/09/22 1344 (!) 58 13 101/73 96 %         Exam:   General Appearance: Comfortable, not using accessory muscles of respiration. HEENT: SIOBHAN. HEAD: Atraumatic  NECK: No JVD, no thyroidomeglay. CAROTIDS:  LUNGS: Clear bilaterally. HEART: S1+S2     ABD: Non-tender, BS Audible    EXT: No edema, and no cysnosis. VASCULAR EXAM: Pulses are intact. PSYCHIATRIC EXAM: Mood is appropriate.   MUSCULOSKELETAL: Grossly no joint deformity. NEUROLOGICAL: Motor and sensory sytem intact and Cranial nerves II-XII intact. Review of Data:   LABS:   Lab Results   Component Value Date/Time    WBC 9.6 05/09/2022 10:50 AM    HGB 13.8 05/09/2022 10:50 AM    HCT 42.0 05/09/2022 10:50 AM    PLATELET 414 40/30/2569 10:50 AM     Lab Results   Component Value Date/Time    Sodium 141 05/09/2022 10:50 AM    Potassium 3.7 05/09/2022 10:50 AM    Chloride 106 05/09/2022 10:50 AM    CO2 30 05/09/2022 10:50 AM    Glucose 119 (H) 05/09/2022 10:50 AM    BUN 26 (H) 05/09/2022 10:50 AM    Creatinine 1.04 05/09/2022 10:50 AM     No results found for: CHOL, CHOLX, CHLST, CHOLV, HDL, HDLP, LDL, LDLC, DLDLP, TGLX, TRIGL, TRIGP  No components found for: GPT  Lab Results   Component Value Date/Time    Hemoglobin A1c 5.6 04/11/2022 02:12 PM       RADIOLOGY:  CT Results  (Last 48 hours)    None        CXR Results  (Last 48 hours)               05/09/22 1117  XR CHEST PORT Final result    Impression:      No acute findings in the chest.        Narrative:  EXAM: XR CHEST PORT       CLINICAL INDICATION/HISTORY: cp   -Additional: None       COMPARISON: 4/25/2022       TECHNIQUE: Frontal view of the chest       _______________       FINDINGS:       HEART AND MEDIASTINUM: Normal cardiac size and mediastinal contours. LUNGS AND PLEURAL SPACES: No focal pneumonic consolidation, pneumothorax, or   pleural effusion.        BONY THORAX AND SOFT TISSUES: No acute osseous abnormality       _______________                   Cardiology Procedures:   Results for orders placed or performed during the hospital encounter of 05/09/22   EKG, 12 LEAD, INITIAL   Result Value Ref Range    Ventricular Rate 63 BPM    Atrial Rate 63 BPM    P-R Interval 162 ms    QRS Duration 90 ms    Q-T Interval 408 ms    QTC Calculation (Bezet) 417 ms    Calculated P Axis 61 degrees    Calculated R Axis 50 degrees    Calculated T Axis 61 degrees    Diagnosis       Normal sinus rhythm  Normal ECG  When compared with ECG of 25-APR-2022 19:35,  No significant change was found        Echo Results  (Last 48 hours)    None       Cardiolite (Tc-99m Sestamibi) stress test        Impression / Plan:    Patient Active Problem List   Diagnosis Code    Benign essential hypertension I10    Calculus of kidney N20.0    Chronic bilateral low back pain with right-sided sciatica M54.41, G89.29    Cobalamin deficiency E53.8    Right hip pain M25.551    Diabetes type 2, 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   No evidence of ACS so far  Resume metoprolol and Eliquis  Will get echocardiogram and stress test  Management plan was discussed with patient          Signed By: Kenzie Short MD     May 9, 2022

## 2022-05-09 NOTE — ROUTINE PROCESS
TRANSFER - OUT REPORT:    Verbal report given to Suleiman Carlin RN(name) on Zoltan Heart  being transferred to telemetry(unit) for routine progression of care       Report consisted of patients Situation, Background, Assessment and   Recommendations(SBAR). Information from the following report(s) SBAR was reviewed with the receiving nurse. Lines:   Peripheral IV 05/09/22 Left Antecubital (Active)   Site Assessment Clean, dry, & intact 05/09/22 1103        Opportunity for questions and clarification was provided.       Patient transported with:   Monitor

## 2022-05-09 NOTE — ED PROVIDER NOTES
EMERGENCY DEPARTMENT HISTORY AND PHYSICAL EXAM    Date: 5/9/2022  Patient Name: Pascual Fritz    History of Presenting Illness     Chief Complaint   Patient presents with    Chest Pain    Shortness of Breath    Malaise         History Provided By: Patient    10:59 AM  Pascual Fritz is a 58 y.o. female with PMHX of atrial fibrillation on Eliquis, hypertension, borderline diabetic, former tobacco smoker who presents to the emergency department C/O chest pressure and shortness of breath. Patient reports symptom started 2 to 3 weeks ago and have gotten worse. She reports symptoms particularly worse when she exerts herself. Denies any fever, cough, vomiting, bowel or urinary complaints. Note she was supposed to get a hip replacement and was off of her Eliquis for period time and restarted 3 days ago when they determined she could not have hip replacement due to her cardiac health. No known sick contacts or recent travel. Currently rates the pressure 3 out of 10. PCP: Saritha Yee, DO    Current Outpatient Medications   Medication Sig Dispense Refill    acetaminophen (Tylenol Extra Strength) 500 mg tablet Take  by mouth every six (6) hours as needed for Pain.  traZODone (DESYREL) 50 mg tablet Take  by mouth nightly.  hydroCHLOROthiazide (HYDRODIURIL) 25 mg tablet Take 25 mg by mouth daily.  gabapentin (NEURONTIN) 300 mg capsule Take 300 mg by mouth three (3) times daily.  buPROPion XL (WELLBUTRIN XL) 150 mg tablet Take 150 mg by mouth daily.  omeprazole (PRILOSEC) 20 mg capsule Take 20 mg by mouth daily.  losartan (COZAAR) 50 mg tablet Take  by mouth daily.  Desvenlafaxine 100 mg Tb24 Take  by mouth.  ergocalciferol, vitamin d2, 10 mcg (400 unit) tab Take 1 Tablet by mouth daily.  omega 3-dha-epa-fish oil (Fish OiL) 100-160-1,000 mg cap Take  by mouth.  citalopram (CELEXA) 10 mg tablet Take 1 Tablet by mouth daily.  30 Tablet 0    apixaban (ELIQUIS) 5 mg tablet Take 1 Tablet by mouth two (2) times a day. 60 Tablet 2    metoprolol tartrate (LOPRESSOR) 25 mg tablet Take 1 Tablet by mouth every twelve (12) hours. 61 Tablet 2       Past History       Past Medical History:  Past Medical History:   Diagnosis Date    A-fib ([facility] [address]     Acid reflux     Arthritis     Back pain     Fibromyalgia     Fibromyalgia     Hypertension     Neuropathy     Sepsis ([facility]        Past Surgical History:  Past Surgical History:   Procedure Laterality Date    HX HERNIA REPAIR      HX HERNIA REPAIR      HX HERNIA REPAIR         Family History:  No family history on file. Social History:  Social History     Tobacco Use    Smoking status: Former Smoker    Smokeless tobacco: Never Used   Substance Use Topics    Alcohol use: Never    Drug use: Yes     Types: Marijuana       Allergies: Allergies   Allergen Reactions    Sulfa (Sulfonamide Antibiotics) Hives         Review of Systems   Review of Systems   Constitutional: Negative for fever. Respiratory: Positive for shortness of breath. Cardiovascular: Positive for chest pain. Gastrointestinal: Negative for abdominal pain. All other systems reviewed and are negative.         Physical Exam     Vitals:    05/09/22 1414 05/09/22 1429 05/09/22 1444 05/09/22 1459   BP: 106/67 (!) 103/53 103/66 111/62   Pulse: (!) 59 (!) 54  66   Resp: 17 8 15 11   Temp:       SpO2: 98% 98% 98% 93%   Weight:       Height:         Physical Exam    Nursing notes and vital signs reviewed    Constitutional: Non toxic appearing, moderate distress  Head: Normocephalic, Atraumatic  Eyes: EOMI  Neck: Supple  Cardiovascular: Regular rate and rhythm, no murmurs, rubs, or gallops  Chest: Normal work of breathing and chest excursion bilaterally  Lungs: Clear to ausculation bilaterally  Abdomen: Soft, non tender, non distended  Back: No evidence of trauma or deformity  Extremities: No evidence of trauma or deformity, no LE edema  Skin: Warm and dry, normal cap refill  Neuro: Alert and appropriate  Psychiatric: Normal mood and affect      Diagnostic Study Results     Labs -     Recent Results (from the past 12 hour(s))   CBC WITH AUTOMATED DIFF    Collection Time: 05/09/22 10:50 AM   Result Value Ref Range    WBC 9.6 4.6 - 13.2 K/uL    RBC 4.32 4.20 - 5.30 M/uL    HGB 13.8 12.0 - 16.0 g/dL    HCT 42.0 35.0 - 45.0 %    MCV 97.2 78.0 - 100.0 FL    MCH 31.9 24.0 - 34.0 PG    MCHC 32.9 31.0 - 37.0 g/dL    RDW 12.9 11.6 - 14.5 %    PLATELET 281 470 - 699 K/uL    MPV 13.5 (H) 9.2 - 11.8 FL    NRBC 0.0 0  WBC    ABSOLUTE NRBC 0.00 0.00 - 0.01 K/uL    NEUTROPHILS 63 40 - 73 %    LYMPHOCYTES 30 21 - 52 %    MONOCYTES 5 3 - 10 %    EOSINOPHILS 2 0 - 5 %    BASOPHILS 0 0 - 2 %    IMMATURE GRANULOCYTES 0 0.0 - 0.5 %    ABS. NEUTROPHILS 6.0 1.8 - 8.0 K/UL    ABS. LYMPHOCYTES 2.9 0.9 - 3.6 K/UL    ABS. MONOCYTES 0.5 0.05 - 1.2 K/UL    ABS. EOSINOPHILS 0.2 0.0 - 0.4 K/UL    ABS. BASOPHILS 0.0 0.0 - 0.1 K/UL    ABS. IMM. GRANS. 0.0 0.00 - 0.04 K/UL    DF AUTOMATED     METABOLIC PANEL, COMPREHENSIVE    Collection Time: 05/09/22 10:50 AM   Result Value Ref Range    Sodium 141 136 - 145 mmol/L    Potassium 3.7 3.5 - 5.5 mmol/L    Chloride 106 100 - 111 mmol/L    CO2 30 21 - 32 mmol/L    Anion gap 5 3.0 - 18 mmol/L    Glucose 119 (H) 74 - 99 mg/dL    BUN 26 (H) 7.0 - 18 MG/DL    Creatinine 1.04 0.6 - 1.3 MG/DL    BUN/Creatinine ratio 25 (H) 12 - 20      GFR est AA >60 >60 ml/min/1.73m2    GFR est non-AA 54 (L) >60 ml/min/1.73m2    Calcium 9.6 8.5 - 10.1 MG/DL    Bilirubin, total 0.3 0.2 - 1.0 MG/DL    ALT (SGPT) 19 13 - 56 U/L    AST (SGOT) 11 10 - 38 U/L    Alk.  phosphatase 97 45 - 117 U/L    Protein, total 7.8 6.4 - 8.2 g/dL    Albumin 4.0 3.4 - 5.0 g/dL    Globulin 3.8 2.0 - 4.0 g/dL    A-G Ratio 1.1 0.8 - 1.7     TROPONIN-HIGH SENSITIVITY    Collection Time: 05/09/22 10:50 AM   Result Value Ref Range    Troponin-High Sensitivity 6 0 - 54 ng/L   EKG, 12 LEAD, INITIAL Collection Time: 05/09/22 10:55 AM   Result Value Ref Range    Ventricular Rate 63 BPM    Atrial Rate 63 BPM    P-R Interval 162 ms    QRS Duration 90 ms    Q-T Interval 408 ms    QTC Calculation (Bezet) 417 ms    Calculated P Axis 61 degrees    Calculated R Axis 50 degrees    Calculated T Axis 61 degrees    Diagnosis       Normal sinus rhythm  Normal ECG  When compared with ECG of 25-APR-2022 19:35,  No significant change was found     COVID-19 RAPID TEST    Collection Time: 05/09/22 11:07 AM   Result Value Ref Range    Specimen source SWAB      COVID-19 rapid test Not detected NOTD     NT-PRO BNP    Collection Time: 05/09/22 11:15 AM   Result Value Ref Range    NT pro-BNP 94 0 - 900 PG/ML   D DIMER    Collection Time: 05/09/22 11:15 AM   Result Value Ref Range    D DIMER 0.50 (H) <0.46 ug/ml(FEU)   PROTHROMBIN TIME + INR    Collection Time: 05/09/22 11:15 AM   Result Value Ref Range    Prothrombin time 13.7 11.5 - 15.2 sec    INR 1.0 0.8 - 1.2     PTT    Collection Time: 05/09/22 11:15 AM   Result Value Ref Range    aPTT 28.7 23.0 - 36.4 SEC   TROPONIN-HIGH SENSITIVITY    Collection Time: 05/09/22 12:56 PM   Result Value Ref Range    Troponin-High Sensitivity 8 0 - 54 ng/L   EKG, 12 LEAD, SUBSEQUENT    Collection Time: 05/09/22 12:56 PM   Result Value Ref Range    Ventricular Rate 54 BPM    Atrial Rate 54 BPM    P-R Interval 180 ms    QRS Duration 90 ms    Q-T Interval 446 ms    QTC Calculation (Bezet) 422 ms    Calculated P Axis 54 degrees    Calculated R Axis 59 degrees    Calculated T Axis 60 degrees    Diagnosis       Sinus bradycardia  Otherwise normal ECG  When compared with ECG of 09-MAY-2022 10:55,  No significant change was found         Radiologic Studies -   XR CHEST PORT   Final Result      No acute findings in the chest.         CT Results  (Last 48 hours)    None        CXR Results  (Last 48 hours)               05/09/22 1117  XR CHEST PORT Final result    Impression:      No acute findings in the chest.        Narrative:  EXAM: XR CHEST PORT       CLINICAL INDICATION/HISTORY: cp   -Additional: None       COMPARISON: 4/25/2022       TECHNIQUE: Frontal view of the chest       _______________       FINDINGS:       HEART AND MEDIASTINUM: Normal cardiac size and mediastinal contours. LUNGS AND PLEURAL SPACES: No focal pneumonic consolidation, pneumothorax, or   pleural effusion. BONY THORAX AND SOFT TISSUES: No acute osseous abnormality       _______________                 Medications given in the ED-  Medications - No data to display      Medical Decision Making   I am the first provider for this patient. I reviewed the vital signs, available nursing notes, past medical history, past surgical history, family history and social history. Vital Signs-Reviewed the patient's vital signs. Pulse Oximetry Analysis - 95% on room air, not hypoxic     Cardiac Monitor:  Rate: 63 bpm  Rhythm: Normal sinus    EKG interpretation: (Preliminary)  EKG read by Dr. Mandi Pedraza at 11:02 AM  Normal sinus rhythm at rate of 63 bpm, WI interval 162 ms, QRS duration 90 ms, similar when compared to prior from April 2022    Repeat EKG interpretation: (Preliminary)  EKG read by Dr. Mandi Pedraza at 1:03 PM  Sinus bradycardia at a rate of 54 bpm, WI interval 180 ms, QRS duration 90 ms, similar compared to prior from earlier today    Records Reviewed: Nursing Notes, Old Medical Records and Previous electrocardiograms    Provider Notes (Medical Decision Making): Zoltan Heart is a 58 y.o. female presenting with worsening chest pressure and dyspnea on exertion over the past few weeks. Patient has significant cardiac risk factors. Wells low risk, PERC positive, D-dimer negative. Initial EKG and high-sensitivity troponin negative. Chest x-ray and labs otherwise benign. Patient is remained symptomatic here in the ED. Discussed with cardiology and hospitalist for further in-hospital cardiac risk stratification.   Patient understands and agrees with this plan. Procedures:  Procedures    ED Course:   CONSULT NOTE:   2:32 PM  Dr. Mahad Haskins spoke with Dr. Lela Gonzalez   Specialty: Cardiology  Discussed pt's hx, disposition, and available diagnostic and imaging results over the telephone. Reviewed care plans. Will consult, admit for cardiac risk stratification. 2:32 PM  Updated patient on all results and plan. All questions answered. Continues to complain of ongoing chest pressure. CONSULT NOTE:   2:42 PM  Dr. Mahad Haskins spoke with Dr. Jean-Paul Amado   Specialty: Hospitalist  Discussed pt's hx, disposition, and available diagnostic and imaging results over the telephone. Reviewed care plans. Accepts to telemetry. Diagnosis and Disposition     Critical Care Time: None    Core Measures:  For Hospitalized Patients:    1. Hospitalization Decision Time:  The decision to hospitalize the patient was made by Dr. Mahad Haskins at 2:32 PM on 5/9/2022    2. Aspirin: Aspirin was not given because the patient did not present with a stroke at the time of their Emergency Department evaluation    2:33 PM  Patient is being admitted to the hospital by Dr. Adela Stevenson. The results of their tests and reasons for their admission have been discussed with them and/or available family. They convey agreement and understanding for the need to be admitted and for their admission diagnosis. CONDITIONS ON ADMISSION:  Sepsis is not present at the time of admission. Deep Vein Thrombosis is not present at the time of admission. Thrombosis is not present at the time of admission. Urinary Tract Infection is not present at the time of admission. Pneumonia is not present at the time of admission. MRSA is not present at the time of admission. Wound infection is not present at the time of admission. Pressure Ulcer is not present at the time of admission. CLINICAL IMPRESSION:    1.  Chest pressure      _______________________________      Please note that this dictation was completed with [employer], the [employer] voice recognition software. Quite often unanticipated grammatical, syntax, homophones, and other interpretive errors are inadvertently transcribed by the computer software. Please disregard these errors. Please excuse any errors that have escaped final proofreading.

## 2022-05-10 ENCOUNTER — APPOINTMENT (OUTPATIENT)
Dept: NUCLEAR MEDICINE | Age: 62
End: 2022-05-10
Attending: INTERNAL MEDICINE
Payer: MEDICARE

## 2022-05-10 ENCOUNTER — APPOINTMENT (OUTPATIENT)
Dept: NON INVASIVE DIAGNOSTICS | Age: 62
End: 2022-05-10
Attending: INTERNAL MEDICINE
Payer: MEDICARE

## 2022-05-10 ENCOUNTER — APPOINTMENT (OUTPATIENT)
Dept: NON INVASIVE DIAGNOSTICS | Age: 62
End: 2022-05-10
Attending: HOSPITALIST
Payer: MEDICARE

## 2022-05-10 VITALS
BODY MASS INDEX: 31.76 KG/M2 | TEMPERATURE: 97.3 F | SYSTOLIC BLOOD PRESSURE: 107 MMHG | RESPIRATION RATE: 20 BRPM | HEIGHT: 64 IN | WEIGHT: 186 LBS | OXYGEN SATURATION: 98 % | DIASTOLIC BLOOD PRESSURE: 59 MMHG | HEART RATE: 64 BPM

## 2022-05-10 LAB
ANION GAP SERPL CALC-SCNC: 5 MMOL/L (ref 3–18)
ATRIAL RATE: 54 BPM
ATRIAL RATE: 63 BPM
BUN SERPL-MCNC: 23 MG/DL (ref 7–18)
BUN/CREAT SERPL: 32 (ref 12–20)
CALCIUM SERPL-MCNC: 9.4 MG/DL (ref 8.5–10.1)
CALCULATED P AXIS, ECG09: 54 DEGREES
CALCULATED P AXIS, ECG09: 61 DEGREES
CALCULATED R AXIS, ECG10: 50 DEGREES
CALCULATED R AXIS, ECG10: 59 DEGREES
CALCULATED T AXIS, ECG11: 60 DEGREES
CALCULATED T AXIS, ECG11: 61 DEGREES
CHLORIDE SERPL-SCNC: 106 MMOL/L (ref 100–111)
CO2 SERPL-SCNC: 29 MMOL/L (ref 21–32)
CREAT SERPL-MCNC: 0.72 MG/DL (ref 0.6–1.3)
DIAGNOSIS, 93000: NORMAL
DIAGNOSIS, 93000: NORMAL
ECHO AO ROOT DIAM: 3.1 CM
ECHO AO ROOT INDEX: 1.63 CM/M2
ECHO EST RA PRESSURE: 3 MMHG
ECHO LA DIAMETER INDEX: 2 CM/M2
ECHO LA DIAMETER: 3.8 CM
ECHO LA TO AORTIC ROOT RATIO: 1.23
ECHO LA VOL 2C: 19 ML (ref 22–52)
ECHO LA VOL 4C: 14 ML (ref 22–52)
ECHO LA VOL BP: 17 ML (ref 22–52)
ECHO LA VOL/BSA BIPLANE: 9 ML/M2 (ref 16–34)
ECHO LA VOLUME AREA LENGTH: 18 ML
ECHO LA VOLUME INDEX A2C: 10 ML/M2 (ref 16–34)
ECHO LA VOLUME INDEX A4C: 7 ML/M2 (ref 16–34)
ECHO LA VOLUME INDEX AREA LENGTH: 9 ML/M2 (ref 16–34)
ECHO LV E' LATERAL VELOCITY: 7 CM/S
ECHO LV E' SEPTAL VELOCITY: 7 CM/S
ECHO LV EDV A2C: 37 ML
ECHO LV EDV A4C: 60 ML
ECHO LV EDV BP: 51 ML (ref 56–104)
ECHO LV EDV INDEX A4C: 32 ML/M2
ECHO LV EDV INDEX BP: 27 ML/M2
ECHO LV EDV NDEX A2C: 19 ML/M2
ECHO LV EJECTION FRACTION A2C: 69 %
ECHO LV EJECTION FRACTION A4C: 63 %
ECHO LV ESV A2C: 12 ML
ECHO LV ESV A4C: 22 ML
ECHO LV ESV BP: 16 ML (ref 19–49)
ECHO LV ESV INDEX A2C: 6 ML/M2
ECHO LV ESV INDEX A4C: 12 ML/M2
ECHO LV ESV INDEX BP: 8 ML/M2
ECHO LV FRACTIONAL SHORTENING: 27 % (ref 28–44)
ECHO LV INTERNAL DIMENSION DIASTOLE INDEX: 2.68 CM/M2
ECHO LV INTERNAL DIMENSION DIASTOLIC: 5.1 CM (ref 3.9–5.3)
ECHO LV INTERNAL DIMENSION SYSTOLIC INDEX: 1.95 CM/M2
ECHO LV INTERNAL DIMENSION SYSTOLIC: 3.7 CM
ECHO LV IVSD: 0.9 CM (ref 0.6–0.9)
ECHO LV MASS 2D: 163.6 G (ref 67–162)
ECHO LV MASS INDEX 2D: 86.1 G/M2 (ref 43–95)
ECHO LV POSTERIOR WALL DIASTOLIC: 0.9 CM (ref 0.6–0.9)
ECHO LV RELATIVE WALL THICKNESS RATIO: 0.35
ECHO LVOT AREA: 4.2 CM2
ECHO LVOT DIAM: 2.3 CM
ECHO LVOT MEAN GRADIENT: 1 MMHG
ECHO LVOT PEAK GRADIENT: 3 MMHG
ECHO LVOT PEAK VELOCITY: 0.8 M/S
ECHO LVOT STROKE VOLUME INDEX: 30.6 ML/M2
ECHO LVOT SV: 58.1 ML
ECHO LVOT VTI: 14 CM
ECHO PULMONARY ARTERY SYSTOLIC PRESSURE (PASP): 30 MMHG
ECHO RIGHT VENTRICULAR SYSTOLIC PRESSURE (RVSP): 30 MMHG
ECHO RV FREE WALL PEAK S': 12 CM/S
ECHO RV TAPSE: 1.8 CM (ref 1.7–?)
ECHO TV REGURGITANT MAX VELOCITY: 2.59 M/S
ECHO TV REGURGITANT PEAK GRADIENT: 27 MMHG
ERYTHROCYTE [DISTWIDTH] IN BLOOD BY AUTOMATED COUNT: 12.9 % (ref 11.6–14.5)
GLUCOSE SERPL-MCNC: 100 MG/DL (ref 74–99)
HCT VFR BLD AUTO: 41.4 % (ref 35–45)
HGB BLD-MCNC: 13.8 G/DL (ref 12–16)
MCH RBC QN AUTO: 31.6 PG (ref 24–34)
MCHC RBC AUTO-ENTMCNC: 33.3 G/DL (ref 31–37)
MCV RBC AUTO: 94.7 FL (ref 78–100)
NRBC # BLD: 0 K/UL (ref 0–0.01)
NRBC BLD-RTO: 0 PER 100 WBC
NUC STRESS EJECTION FRACTION: 80 %
P-R INTERVAL, ECG05: 162 MS
P-R INTERVAL, ECG05: 180 MS
PLATELET # BLD AUTO: 152 K/UL (ref 135–420)
PMV BLD AUTO: 13.2 FL (ref 9.2–11.8)
POTASSIUM SERPL-SCNC: 3.6 MMOL/L (ref 3.5–5.5)
Q-T INTERVAL, ECG07: 408 MS
Q-T INTERVAL, ECG07: 446 MS
QRS DURATION, ECG06: 90 MS
QRS DURATION, ECG06: 90 MS
QTC CALCULATION (BEZET), ECG08: 417 MS
QTC CALCULATION (BEZET), ECG08: 422 MS
RBC # BLD AUTO: 4.37 M/UL (ref 4.2–5.3)
SODIUM SERPL-SCNC: 140 MMOL/L (ref 136–145)
STRESS BASELINE HR: 62 BPM
STRESS ESTIMATED WORKLOAD: 1 METS
STRESS EXERCISE DUR MIN: 0 MIN
STRESS EXERCISE DUR SEC: 55 SEC
STRESS PEAK DIAS BP: 71 MMHG
STRESS PEAK SYS BP: 117 MMHG
STRESS PERCENT HR ACHIEVED: 57 %
STRESS POST PEAK HR: 90 BPM
STRESS RATE PRESSURE PRODUCT: NORMAL BPM*MMHG
STRESS TARGET HR: 158 BPM
TID: 1.2
VENTRICULAR RATE, ECG03: 54 BPM
VENTRICULAR RATE, ECG03: 63 BPM
WBC # BLD AUTO: 7.3 K/UL (ref 4.6–13.2)

## 2022-05-10 PROCEDURE — 93306 TTE W/DOPPLER COMPLETE: CPT

## 2022-05-10 PROCEDURE — 93017 CV STRESS TEST TRACING ONLY: CPT

## 2022-05-10 PROCEDURE — 36415 COLL VENOUS BLD VENIPUNCTURE: CPT

## 2022-05-10 PROCEDURE — G0378 HOSPITAL OBSERVATION PER HR: HCPCS

## 2022-05-10 PROCEDURE — 80048 BASIC METABOLIC PNL TOTAL CA: CPT

## 2022-05-10 PROCEDURE — 85027 COMPLETE CBC AUTOMATED: CPT

## 2022-05-10 PROCEDURE — A9500 TC99M SESTAMIBI: HCPCS

## 2022-05-10 PROCEDURE — 74011250637 HC RX REV CODE- 250/637: Performed by: FAMILY MEDICINE

## 2022-05-10 PROCEDURE — 74011250636 HC RX REV CODE- 250/636: Performed by: HOSPITALIST

## 2022-05-10 PROCEDURE — 74011250637 HC RX REV CODE- 250/637: Performed by: HOSPITALIST

## 2022-05-10 PROCEDURE — 74011000250 HC RX REV CODE- 250: Performed by: FAMILY MEDICINE

## 2022-05-10 RX ORDER — TETRAKIS(2-METHOXYISOBUTYLISOCYANIDE)COPPER(I) TETRAFLUOROBORATE 1 MG/ML
35.5 INJECTION, POWDER, LYOPHILIZED, FOR SOLUTION INTRAVENOUS
Status: COMPLETED | OUTPATIENT
Start: 2022-05-10 | End: 2022-05-10

## 2022-05-10 RX ORDER — TETRAKIS(2-METHOXYISOBUTYLISOCYANIDE)COPPER(I) TETRAFLUOROBORATE 1 MG/ML
10.5 INJECTION, POWDER, LYOPHILIZED, FOR SOLUTION INTRAVENOUS
Status: COMPLETED | OUTPATIENT
Start: 2022-05-10 | End: 2022-05-10

## 2022-05-10 RX ORDER — LIDOCAINE 4 G/100G
1 PATCH TOPICAL EVERY 24 HOURS
Status: DISCONTINUED | OUTPATIENT
Start: 2022-05-10 | End: 2022-05-10 | Stop reason: HOSPADM

## 2022-05-10 RX ADMIN — APIXABAN 5 MG: 5 TABLET, FILM COATED ORAL at 12:56

## 2022-05-10 RX ADMIN — TETRAKIS(2-METHOXYISOBUTYLISOCYANIDE)COPPER(I) TETRAFLUOROBORATE 10.5 MILLICURIE: 1 INJECTION, POWDER, LYOPHILIZED, FOR SOLUTION INTRAVENOUS at 11:00

## 2022-05-10 RX ADMIN — ACETAMINOPHEN 650 MG: 325 TABLET ORAL at 05:50

## 2022-05-10 RX ADMIN — TETRAKIS(2-METHOXYISOBUTYLISOCYANIDE)COPPER(I) TETRAFLUOROBORATE 35.5 MILLICURIE: 1 INJECTION, POWDER, LYOPHILIZED, FOR SOLUTION INTRAVENOUS at 12:00

## 2022-05-10 RX ADMIN — GABAPENTIN 300 MG: 300 CAPSULE ORAL at 17:32

## 2022-05-10 RX ADMIN — REGADENOSON 0.4 MG: 0.08 INJECTION, SOLUTION INTRAVENOUS at 11:47

## 2022-05-10 RX ADMIN — LOSARTAN POTASSIUM 50 MG: 50 TABLET, FILM COATED ORAL at 12:56

## 2022-05-10 RX ADMIN — GABAPENTIN 300 MG: 300 CAPSULE ORAL at 12:56

## 2022-05-10 RX ADMIN — HYDROCHLOROTHIAZIDE 25 MG: 25 TABLET ORAL at 12:56

## 2022-05-10 RX ADMIN — METOPROLOL TARTRATE 25 MG: 25 TABLET, FILM COATED ORAL at 12:56

## 2022-05-10 RX ADMIN — BUPROPION HYDROCHLORIDE 150 MG: 150 TABLET, EXTENDED RELEASE ORAL at 12:56

## 2022-05-10 RX ADMIN — PANTOPRAZOLE SODIUM 40 MG: 40 TABLET, DELAYED RELEASE ORAL at 06:43

## 2022-05-10 NOTE — PROGRESS NOTES
----- Message from Kevin Howard MD sent at 5/20/2019  3:27 PM CDT -----  Confirm no K supplement or nsaid use. Coni k level.   Care manager noted patient is in OBS status; per H&P, she is a 58 yr old female who presented with on and off chest pressure and dyspnea on exertion for last 2-3 weeks; EKG NSR with no ST-T wave changes, high sensitivity troponin in normal range - cardiology ad medicine consulted for evaluation. 1030:  Oral and Written notification given to patient and/or caregiver informing them that they are currently an Outpatient receiving care in our facility. Outpatient services include Observation Services. 1240: Care manager met with patient; verified PCP as Louise Fisher, next visit will be 5/19/22; patient has had multiple tests this a.m. (stress test, US, CT scan); will continue to follow and assist with discharge planning as needed. Patient states she lives with boyfriend and son. Care Management Interventions  PCP Verified by CM:  Yes  Mode of Transport at Discharge: Self  Transition of Care Consult (CM Consult): Discharge Planning  Support Systems: Spouse/Significant Other,Child(cynthia)  Confirm Follow Up Transport: Family  The Plan for Transition of Care is Related to the Following Treatment Goals : Chest pressure  The Patient and/or Patient Representative was Provided with a Choice of Provider and Agrees with the Discharge Plan?: Yes  Name of the Patient Representative Who was Provided with a Choice of Provider and Agrees with the Discharge Plan: Quentin Gottron, patient  Discharge Location  Patient Expects to be Discharged to[de-identified] Home with family assistance

## 2022-05-10 NOTE — DISCHARGE SUMMARY
Discharge Summary    Patient: Stacey Isabel MRN: [id number]  CSN: [id number]    YOB: 1960  Age: 58 y.o. Sex: female    DOA: 5/9/2022 LOS:  LOS: 0 days   Discharge Date:      Primary Care Provider:  Zia Aguila DO    Admission Diagnoses: Chest pressure [R07.89]    Discharge Diagnoses:    Problem List as of 5/10/2022 Date Reviewed: 8/16/2021          Codes Class Noted - Resolved    * (Principal) Chest pressure ICD-10-CM: R07.89  ICD-9-CM: 786.59  5/9/2022 - Present        PAF (paroxysmal atrial fibrillation) ([facility] ICD-10-CM: I48.0  ICD-9-CM: 427.31  5/9/2022 - Present        Systolic CHF, chronic (HCC) ICD-10-CM: I50.22  ICD-9-CM: 428.22, 428.0  5/9/2022 - Present        Diabetes type 2, 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  A dilated fundus exam showed no diabetic retinopathy without macular edema. Maintain optimum BS, BP, and BMI. Monitor annually. Chronic bilateral low back pain with right-sided sciatica ICD-10-CM: M54.41, G89.29  ICD-9-CM: 724.2, 724.3, 338.29  1/18/2019 - Present    Overview Signed 8/16/2021  3:55 PM by Donna Borden MD     Last Assessment & Plan:   Formatting of this note might be different from the original.  New- will get baseline xray. Right hip pain ICD-10-CM: M25.551  ICD-9-CM: 719.45  1/18/2019 - Present    Overview Signed 8/16/2021  3:55 PM by Donna Borden MD     Last Assessment & Plan:   Formatting of this note might be different from the original.  Unchanged- referral sent to ortho as she would like to get another steroid injection which helped her about a year ago. Encouraged weight loss and regular exercise as well.              Peripheral polyneuropathy ICD-10-CM: G62.9  ICD-9-CM: 356.9  12/14/2017 - Present Overview Signed 8/16/2021  3:55 PM by Camilla Sanchez MD     Last Assessment & Plan:   Formatting of this note might be different from the original.  Unchanged- pt states her symptoms have worsened but very well could be her fibromyalgia as her depression and anxiety have worsened. She has f/u with Dr. Maninder Sams tomorrow. She is taking 900mg of gabapentin at bedtime. She says the pain is throughout the day as well. Recommended taking tid which I have prescribed - specifically 300mg, 300mg mid-afternoon and 600mg qhs. May also benefit from TCA to help with depression and neuropathy/fibromyalgia pain. Benign essential hypertension ICD-10-CM: I10  ICD-9-CM: 401.1  9/5/2013 - Present    Overview Signed 8/16/2021  3:55 PM by Camilla Sanchez MD     Last Assessment & Plan:   Formatting of this note might be different from the original.  Unchanged- BP continues to be uncontrolled. I am going to add losartan 50mg to her HCTZ 25mg daily. I have given her a hard copy of BP monitor order to see if her insurance will cover. Otherwise, pt to come to the office for 3 BP checks before our next f/u visit in 6 weeks. Pt is to wait a week after she has started the zoloft and knows she is tolerating before starting the losartan. Cobalamin deficiency ICD-10-CM: E53.8  ICD-9-CM: 266.2  2/7/2013 - Present    Overview Signed 8/16/2021  3:55 PM by Camilla Sanchez MD     Last Assessment & Plan:   Formatting of this note might be different from the original.  Unchanged- at goal.  Will continue current management.              Calculus of kidney ICD-10-CM: N20.0  ICD-9-CM: 592.0  9/17/2012 - Present        Gastroesophageal reflux disease ICD-10-CM: K21.9  ICD-9-CM: 530.81  9/29/2003 - Present    Overview Signed 8/16/2021  3:55 PM by Camilla Sanchez MD     Last Assessment & Plan:   Formatting of this note might be different from the original.  Unchanged- suggested trying to stop the PPI and see if she is able to not take it. Reviewed long term side effects of PPI therapy. Discharge Medications:     Current Discharge Medication List      CONTINUE these medications which have NOT CHANGED    Details   omeprazole (PRILOSEC) 20 mg capsule Take 20 mg by mouth daily. losartan (COZAAR) 50 mg tablet Take  by mouth daily. omega 3-dha-epa-fish oil (Fish OiL) 100-160-1,000 mg cap Take  by mouth. acetaminophen (Tylenol Extra Strength) 500 mg tablet Take  by mouth every six (6) hours as needed for Pain.      hydroCHLOROthiazide (HYDRODIURIL) 25 mg tablet Take 25 mg by mouth daily. gabapentin (NEURONTIN) 300 mg capsule Take 300 mg by mouth three (3) times daily. buPROPion XL (WELLBUTRIN XL) 150 mg tablet Take 150 mg by mouth daily. Desvenlafaxine 100 mg Tb24 Take  by mouth.      ergocalciferol, vitamin d2, 10 mcg (400 unit) tab Take 1 Tablet by mouth daily. apixaban (ELIQUIS) 5 mg tablet Take 1 Tablet by mouth two (2) times a day. Qty: 60 Tablet, Refills: 2      metoprolol tartrate (LOPRESSOR) 25 mg tablet Take 1 Tablet by mouth every twelve (12) hours. Qty: 60 Tablet, Refills: 2         STOP taking these medications       traZODone (DESYREL) 50 mg tablet Comments:   Reason for Stopping:         citalopram (CELEXA) 10 mg tablet Comments:   Reason for Stopping:               Discharge Condition: Good    Procedures : None    Consults: Cardiology      PHYSICAL EXAM   Visit Vitals  BP (!) 107/59 (BP 1 Location: Right upper arm, BP Patient Position: Sitting)   Pulse 64   Temp 97.3 °F (36.3 °C)   Resp 20   Ht 5' 4\" (1.626 m)   Wt 84.4 kg (186 lb)   SpO2 98%   BMI 31.93 kg/m²     General: Awake, cooperative, no acute distress    HEENT: NC, Atraumatic. PERRLA, EOMI. Anicteric sclerae. Lungs:  CTA Bilaterally. No Wheezing/Rhonchi/Rales. Heart:  Regular  rhythm,  No murmur, No Rubs, No Gallops  Abdomen: Soft, Non distended, Non tender.  +Bowel sounds, Extremities: No c/c/e  Psych:   Not anxious or agitated. Neurologic:  No acute neurological deficits. Admission HPI :   Rigo Aparicio is a 58 y.o. female with paroxysmal atrial fibrillation, neuropathy, chronic systolic CHF, fibromyalgia, hypertension presents to ER with concerns of chest pressure and dyspnea on exertion. Patient reports that she has been having on and off chest pressure and dyspnea on exertion for the past 2 to 3 weeks. She reports that her symptoms has been progressively getting worse. She reported that she was supposed to have hip surgery on April 25 and this was not done due to her cardiac condition. She reports that she was told to stop medication and she was not taking her medications. She recently started taking her beta-blocker and Eliquis. She also reported that she stopped taking her antidepressants. Which she started 3 to 4 days ago. She denies any fever, chills, headache, nausea, vomiting. In ER her EKG showed normal sinus rhythm with no ST-T wave changes. Her high-sensitivity troponin in normal range x2. Given her symptoms cardiology recommended admission. Hospital Course :   Ms. Isabella Ma was admitted to monitored floor, she was seen and followed by cardiology. She was continued on her home medications. Her high sensitivity troponin in normal range, echocardiogram showed normal LVF, EF of 20-48%, grade 1 diastolic dysfunction. lexiscan stress test negative for ischemia.     Activity: Activity as tolerated    Diet: Diabetic Diet    Follow-up: PCP, cardiology    Disposition: home    Minutes spent on discharge: 40       Labs: Results:       Chemistry Recent Labs     05/10/22  0620 05/09/22  1050   * 119*    141   K 3.6 3.7    106   CO2 29 30   BUN 23* 26*   CREA 0.72 1.04   CA 9.4 9.6   AGAP 5 5   BUCR 32* 25*   AP  --  97   TP  --  7.8   ALB  --  4.0   GLOB  --  3.8   AGRAT  --  1.1      CBC w/Diff Recent Labs 05/10/22  0620 05/09/22  1050   WBC 7.3 9.6   RBC 4.37 4.32   HGB 13.8 13.8   HCT 41.4 42.0    177   GRANS  --  63   LYMPH  --  30   EOS  --  2      Cardiac Enzymes No results for input(s): CPK, CKND1, SALONI in the last 72 hours. No lab exists for component: CKRMB, TROIP   Coagulation Recent Labs     05/09/22  1115   PTP 13.7   INR 1.0   APTT 28.7       Lipid Panel No results found for: CHOL, CHOLPOCT, CHOLX, CHLST, CHOLV, 611450, HDL, HDLP, LDL, LDLC, DLDLP, 881692, VLDLC, VLDL, TGLX, TRIGL, TRIGP, TGLPOCT, CHHD, CHHDX   BNP No results for input(s): BNPP in the last 72 hours. Liver Enzymes Recent Labs     05/09/22  1050   TP 7.8   ALB 4.0   AP 97      Thyroid Studies Lab Results   Component Value Date/Time    TSH 0.97 05/09/2022 11:15 AM            Significant Diagnostic Studies: CTA CHEST W OR W WO CONT    Result Date: 4/25/2022  EXAM: CTA chest INDICATION: Shortness of breath COMPARISON: 7/86/8019 TECHNIQUE: Helical volumetric scanning was performed from the thoracic inlet through the diaphragm during pulmonary arterial phase of nonionic IV contrast enhancement. Axial reconstructions, and slab MIP coronal and sagittal reformats were generated. One or more dose reduction techniques were used on this CT: automated exposure control, adjustment of the mAs and/or kVp according to patient size, and iterative reconstruction techniques. The specific techniques used on this CT exam have been documented in the patient's electronic medical record. Digital Imaging and Communications in Medicine (DICOM) format image data are available to nonaffiliated external healthcare facilities or entities on a secure, media free, reciprocally searchable basis with patient authorization for at least a 12-month period after this study. _______________ FINDINGS: EXAM QUALITY: Overall exam quality is adequate, pulmonary arterial enhancement is optimal, breath hold is adequate, other factors affecting quality are absent. PULMONARY ARTERIES: No evidence of pulmonary embolism. MEDIASTINUM: Normal heart size. Scattered coronary arterial calcific atherosclerosis is present. No pericardial effusion. A small hiatal hernia is redemonstrated. LUNGS: Mild dependent atelectasis is present bilaterally. Calcified granulomas are present in the left upper lobe and right lower lobe. No suspicious nodules. PLEURA/CHEST WALL: Normal. AIRWAY: No central airway filling or compromise. LYMPH NODES: No enlarged nodes. UPPER ABDOMEN: Calcified granulomas are present in the spleen. Nonobstructing upper pole right renal calculus is redemonstrated. OTHER: No acute or aggressive osseous abnormalities identified. _______________     1. No evidence of pulmonary embolism. 2.  Old granulomatous disease. 3. Scattered coronary arterial calcific atherosclerosis. XR CHEST PORT    Result Date: 5/9/2022  EXAM: XR CHEST PORT CLINICAL INDICATION/HISTORY: cp -Additional: None COMPARISON: 4/25/2022 TECHNIQUE: Frontal view of the chest _______________ FINDINGS: HEART AND MEDIASTINUM: Normal cardiac size and mediastinal contours. LUNGS AND PLEURAL SPACES: No focal pneumonic consolidation, pneumothorax, or pleural effusion. BONY THORAX AND SOFT TISSUES: No acute osseous abnormality _______________     No acute findings in the chest.     XR CHEST PORT    Result Date: 4/25/2022  EXAM: XR CHEST PORT CLINICAL INDICATION/HISTORY: chest pain -Additional: None COMPARISON: Chest radiographs and CT dated 8/19/2021 TECHNIQUE: Frontal view of the chest _______________ FINDINGS: HEART AND MEDIASTINUM: Normal cardiac size and mediastinal contours. LUNGS AND PLEURAL SPACES: No focal pneumonic consolidation, pneumothorax, or pleural effusion.  BONY THORAX AND SOFT TISSUES: No acute osseous abnormality _______________     No acute findings in the chest.     ECHO ADULT COMPLETE    Result Date: 5/10/2022    Left Ventricle: Normal left ventricular systolic function with a visually estimated EF of 55 - 60%. Left ventricle size is normal. Normal wall thickness. Normal wall motion. Grade I diastolic dysfunction with normal LAP.   Pulmonary Arteries: Mild pulmonary hypertension present. The estimated PASP is 30 mmHg. NUCLEAR CARDIAC STRESS TEST    Result Date: 5/10/2022    Nuclear Findings: LVEF measures 80%. The defect appears to be caused by breast attenuation and soft tissue.   Nuclear Findings: LVEF measures 80%. LV perfusion is probably normal.   Nuclear Findings: Normal left ventricular systolic function post-stress. LVEF measures 80%.   Nuclear Findings: The study is negative for myocardial ischemia. Findings suggest a low risk of myocardial ischemia.   ECG: Resting ECG demonstrates normal sinus rhythm.   ECG: Stress ECG was negative for ischemia.   Stress Test: A pharmacological stress test was performed using lexiscan. This is a probably negative pharmacological Lexiscan nuclear stress test for detection of ischemia. Nuclear images are severely compromised with motion, diaphragmatic and breast attenuation artifact. Accuracy of the study is also reduced due to high uptake of radioisotope in intestine close to the inferior wall. Resting EKG normal sinus rhythm. Nondiagnostic EKG during pharmacological stress part. No arrhythmia during pharmacological stress test. No evidence of large ischemia or infarction on attenuated corrected nuclear images. EF is at 64%         No results found for this or any previous visit. Please note that this dictation was completed with [employer], the [employer] voice recognition software. Quite often unanticipated grammatical, syntax, homophones, and other interpretive errors are inadvertently transcribed by the computer software. Please disregard these errors. Please excuse any errors that have escaped final proofreading.      CC: Clara Sadler,

## 2022-05-10 NOTE — PROGRESS NOTES
Cardiology Progress Note        Patient: Alfonzo Kraus        Sex: female          DOA: 5/9/2022  YOB: 1960      Age:  58 y.o.        LOS:  LOS: 0 days   Assessment/Plan     Principal Problem:    Chest pressure (5/9/2022)    Active Problems:    Diabetes type 2, no ocular involvement ([address] (5/4/2021)      Overview: Last Assessment & Plan:       Formatting of this note might be different from the original.      A dilated fundus exam showed no diabetic retinopathy without macular       edema. Maintain optimum BS, BP, and BMI. Monitor annually. Gastroesophageal reflux disease (9/29/2003)      Overview: Last Assessment & Plan:       Formatting of this note might be different from the original.      Unchanged- suggested trying to stop the PPI and see if she is able to not       take it. Reviewed long term side effects of PPI therapy. PAF (paroxysmal atrial fibrillation) ([facility] (8/1/6932)      Systolic CHF, chronic ([facility] (5/9/2022)        Plan:    Maintaining normal sinus rhythm  Chest pain with anxiety/stress  ACS ruled out  Stress test is technically limited but overall negative for ischemia   echocardiogram normal EF and wall motion  Finding discussed in detail with patient. I have discussed with the patient not to stop metoprolol perioperatively. Patient was recently scheduled for hip replacement and she stopped all of her medication. Subjective:    cc:  Chest pain        REVIEW OF SYSTEMS:     General: No fevers or chills. Cardiovascular: No chest pain or pressure. No palpitations.  No ankle swelling  Pulmonary: No SOB, orthopnea, PND  Gastrointestinal: No nausea, vomiting or diarrhea      Objective:      Visit Vitals  BP (!) 107/59 (BP 1 Location: Right upper arm, BP Patient Position: Sitting)   Pulse 64   Temp 97.3 °F (36.3 °C)   Resp 20   Ht 5' 4\" (1.626 m)   Wt 84.4 kg (186 lb)   SpO2 98%   BMI 31.93 kg/m²     Body mass index is 31.93 kg/m². Physical Exam:  General Appearance: Comfortable, not using accessory muscles of respiration. NECK: No JVD, no thyroidomeglay. LUNGS: Clear bilaterally. HEART: S1+S2 audible,    ABD: Non-tender, BS Audible    EXT: No edema, and no cysnosis. VASCULAR EXAM: Pulses are intact. PSYCHIATRIC EXAM: Mood is appropriate. Medication:  Current Facility-Administered Medications   Medication Dose Route Frequency    lidocaine 4 % patch 1 Patch  1 Patch TransDERmal Q24H    apixaban (ELIQUIS) tablet 5 mg  5 mg Oral BID    buPROPion XL (WELLBUTRIN XL) tablet 150 mg  150 mg Oral DAILY    desvenlafaxine Tb24 100 mg (Patient Supplied)  100 mg Oral DAILY    gabapentin (NEURONTIN) capsule 300 mg  300 mg Oral TID    hydroCHLOROthiazide (HYDRODIURIL) tablet 25 mg  25 mg Oral DAILY    losartan (COZAAR) tablet 50 mg  50 mg Oral DAILY    metoprolol tartrate (LOPRESSOR) tablet 25 mg  25 mg Oral Q12H    pantoprazole (PROTONIX) tablet 40 mg  40 mg Oral ACB    furosemide (LASIX) injection 40 mg  40 mg IntraVENous DAILY    acetaminophen (TYLENOL) tablet 650 mg  650 mg Oral Q6H PRN               Lab/Data Reviewed:  Procedures/imaging: see electronic medical records for all procedures/Xrays   and details which were not copied into this note but were reviewed prior to creation of Plan       All lab results for the last 24 hours reviewed.      Recent Labs     05/10/22  0620 05/09/22  1050   WBC 7.3 9.6   HGB 13.8 13.8   HCT 41.4 42.0    177     Recent Labs     05/10/22  0620 05/09/22  1050    141   K 3.6 3.7    106   CO2 29 30   * 119*   BUN 23* 26*   CREA 0.72 1.04   CA 9.4 9.6       RADIOLOGY:  CT Results  (Last 48 hours)    None        CXR Results  (Last 48 hours)               05/09/22 1117  XR CHEST PORT Final result    Impression:      No acute findings in the chest.        Narrative:  EXAM: XR CHEST PORT       CLINICAL INDICATION/HISTORY: cp   -Additional: None       COMPARISON: 4/25/2022 TECHNIQUE: Frontal view of the chest       _______________       FINDINGS:       HEART AND MEDIASTINUM: Normal cardiac size and mediastinal contours. LUNGS AND PLEURAL SPACES: No focal pneumonic consolidation, pneumothorax, or   pleural effusion.        BONY THORAX AND SOFT TISSUES: No acute osseous abnormality       _______________                   Cardiology Procedures:   Results for orders placed or performed during the hospital encounter of 05/09/22   EKG, 12 LEAD, INITIAL   Result Value Ref Range    Ventricular Rate 63 BPM    Atrial Rate 63 BPM    P-R Interval 162 ms    QRS Duration 90 ms    Q-T Interval 408 ms    QTC Calculation (Bezet) 417 ms    Calculated P Axis 61 degrees    Calculated R Axis 50 degrees    Calculated T Axis 61 degrees    Diagnosis       Normal sinus rhythm  Normal ECG  When compared with ECG of 25-APR-2022 19:35,  No significant change was found        Echo Results  (Last 48 hours)    None       Cardiolite (Tc-99m Sestamibi) stress test    Signed By: Kenzie Short MD     May 10, 2022

## 2022-05-10 NOTE — DISCHARGE INSTRUCTIONS
Patient Education        Musculoskeletal Chest Pain: Care Instructions  Your Care Instructions     Chest pain is not always a sign that something is wrong with your heart or that you have another serious problem. The doctor thinks your chest pain is caused by strained muscles or ligaments, inflamed chest cartilage, or another problem in your chest, rather than by your heart. You may need more tests to find the cause of your chest pain. Follow-up care is a key part of your treatment and safety. Be sure to make and go to all appointments, and call your doctor if you are having problems. It's also a good idea to know your test results and keep a list of the medicines you take. How can you care for yourself at home? · Take pain medicines exactly as directed. ? If the doctor gave you a prescription medicine for pain, take it as prescribed. ? If you are not taking a prescription pain medicine, ask your doctor if you can take an over-the-counter medicine. · Rest and protect the sore area. · Stop, change, or take a break from any activity that may be causing your pain or soreness. · Put ice or a cold pack on the sore area for 10 to 20 minutes at a time. Try to do this every 1 to 2 hours for the next 3 days (when you are awake) or until the swelling goes down. Put a thin cloth between the ice and your skin. · After 2 or 3 days, apply a heating pad set on low or a warm cloth to the area that hurts. Some doctors suggest that you go back and forth between hot and cold. · Do not wrap or tape your ribs for support. This may cause you to take smaller breaths, which could increase your risk of lung problems. · Mentholated creams such as Bengay or Icy Hot may soothe sore muscles. Follow the instructions on the package. · Follow your doctor's instructions for exercising. · Gentle stretching and massage may help you get better faster.  Stretch slowly to the point just before pain begins, and hold the stretch for at least 15 to 30 seconds. Do this 3 or 4 times a day. Stretch just after you have applied heat. · As your pain gets better, slowly return to your normal activities. Any increased pain may be a sign that you need to rest a while longer. When should you call for help? Call 911 anytime you think you may need emergency care. For example, call if:    · You have chest pain or pressure. This may occur with:  ? Sweating. ? Shortness of breath. ? Nausea or vomiting. ? Pain that spreads from the chest to the neck, jaw, or one or both shoulders or arms. ? Dizziness or lightheadedness. ? A fast or uneven pulse. After calling 911, chew 1 adult-strength aspirin. Wait for an ambulance. Do not try to drive yourself.     · You have sudden chest pain and shortness of breath, or you cough up blood. Call your doctor now or seek immediate medical care if:    · You have any trouble breathing.     · Your chest pain gets worse.     · Your chest pain occurs consistently with exercise and is relieved by rest.   Watch closely for changes in your health, and be sure to contact your doctor if:    · Your chest pain does not get better after 1 week. Where can you learn more? Go to http://www.cazares.com/  Enter V293 in the search box to learn more about \"Musculoskeletal Chest Pain: Care Instructions. \"  Current as of: July 1, 2021               Content Version: 13.2  © 2006-2022 [employer]. Care instructions adapted under license by [employer] (which disclaims liability or warranty for this information). If you have questions about a medical condition or this instruction, always ask your healthcare professional. [place] [address] any warranty or liability for your use of this information. Patient Education        Low Sodium Diet (2,000 Milligram): Care Instructions  Overview     Limiting sodium can be an important part of managing some health problems.   The most common source of sodium is salt. People get most of the salt in their diet from canned, prepared, and packaged foods. Fast food and restaurant meals also are very high in sodium. Your doctor will probably limit your sodium to less than 2,000 milligrams (mg) a day. This limit counts all the sodium in prepared and packaged foods and any salt you add to your food. Follow-up care is a key part of your treatment and safety. Be sure to make and go to all appointments, and call your doctor if you are having problems. It's also a good idea to know your test results and keep a list of the medicines you take. How can you care for yourself at home? Read food labels  · Read labels on cans and food packages. The labels tell you how much sodium is in each serving. Make sure that you look at the serving size. If you eat more than the serving size, you have eaten more sodium. · Food labels also tell you the Percent Daily Value for sodium. Choose products with low Percent Daily Values for sodium. · Be aware that sodium can come in forms other than salt, including monosodium glutamate (MSG), sodium citrate, and sodium bicarbonate (baking soda). MSG is often added to Asian food. When you eat out, you can sometimes ask for food without MSG or added salt. Buy low-sodium foods  · Buy foods that are labeled \"unsalted\" (no salt added), \"sodium-free\" (less than 5 mg of sodium per serving), or \"low-sodium\" (140 mg or less of sodium per serving). Foods labeled \"reduced-sodium\" and \"light sodium\" may still have too much sodium. Be sure to read the label to see how much sodium you are getting. · Buy fresh vegetables, or frozen vegetables without added sauces. Buy low-sodium versions of canned vegetables, soups, and other canned goods. Prepare low-sodium meals  · Cut back on the amount of salt you use in cooking. This will help you adjust to the taste. Do not add salt after cooking.  One teaspoon of salt has about 2,300 mg of sodium. · Take the salt shaker off the table. · Flavor your food with garlic, lemon juice, onion, vinegar, herbs, and spices. Do not use soy sauce, lite soy sauce, steak sauce, onion salt, garlic salt, celery salt, or ketchup on your food. · Use low-sodium salad dressings, sauces, and ketchup. Or make your own salad dressings and sauces without adding salt. · Use less salt (or none) when recipes call for it. You can often use half the salt a recipe calls for without losing flavor. Other foods such as rice, pasta, and grains do not need added salt. · Rinse canned vegetables, and cook them in fresh water. This removes some--but not all--of the salt. · Avoid water that is naturally high in sodium or that has been treated with water softeners, which add sodium. If you buy bottled water, read the label and choose a sodium-free brand. Avoid high-sodium foods  · Avoid eating:  ? Smoked, cured, salted, and canned meat, fish, and poultry. ? Ham, garcia, hot dogs, and luncheon meats. ? Regular, hard, and processed cheese and regular peanut butter. ? Crackers with salted tops, and other salted snack foods such as pretzels, chips, and salted popcorn. ? Frozen prepared meals, unless labeled low-sodium. ? Canned and dried soups, broths, and bouillon, unless labeled sodium-free or low-sodium. ? Canned vegetables, unless labeled sodium-free or low-sodium. ? [place] fries, pizza, tacos, and other fast foods. ? Pickles, olives, ketchup, and other condiments, especially soy sauce, unless labeled sodium-free or low-sodium. Where can you learn more? Go to http://www.gray.com/  Enter V843 in the search box to learn more about \"Low Sodium Diet (2,000 Milligram): Care Instructions. \"  Current as of: September 8, 2021               Content Version: 13.2  © 6254-8372 [employer].    Care instructions adapted under license by [employer] (which disclaims liability or warranty for this information). If you have questions about a medical condition or this instruction, always ask your healthcare professional. [place] [address] any warranty or liability for your use of this information.

## 2022-06-14 ENCOUNTER — HOSPITAL ENCOUNTER (OUTPATIENT)
Dept: PREADMISSION TESTING | Age: 62
Discharge: HOME | End: 2022-06-14

## 2022-06-14 VITALS — WEIGHT: 195 LBS | HEIGHT: 64 IN | BODY MASS INDEX: 33.29 KG/M2

## 2022-06-14 RX ORDER — VITAMIN E CAP 100 UNIT 100 UNIT
100 CAP ORAL DAILY
COMMUNITY

## 2022-06-14 RX ORDER — SODIUM CHLORIDE, SODIUM LACTATE, POTASSIUM CHLORIDE, CALCIUM CHLORIDE 600; 310; 30; 20 MG/100ML; MG/100ML; MG/100ML; MG/100ML
125 INJECTION, SOLUTION INTRAVENOUS CONTINUOUS
Status: CANCELLED | OUTPATIENT
Start: 2022-06-14

## 2022-06-14 RX ORDER — POTASSIUM CHLORIDE 750 MG/1
10 TABLET, FILM COATED, EXTENDED RELEASE ORAL DAILY
COMMUNITY

## 2022-06-14 RX ORDER — CEFAZOLIN SODIUM/WATER 2 G/20 ML
2 SYRINGE (ML) INTRAVENOUS ONCE
Status: CANCELLED | OUTPATIENT
Start: 2022-06-14 | End: 2022-06-14

## 2022-06-14 NOTE — PERIOP NOTES
PAT - SURGICAL PRE-ADMISSION INSTRUCTIONS    NAME:  Almas Sanford                                                          TODAY'S DATE:  6/14/2022    SURGERY DATE:  6/20/2022                                  SURGERY ARRIVAL TIME:  TBS    1. Do NOT eat or drink anything, including candy or gum, after MIDNIGHT on 6/20/2022 , unless you have specific instructions from your Surgeon or Anesthesia Provider to do so. 2. No smoking 24 hours before surgery. 3. No alcohol 24 hours prior to the day of surgery. 4. No recreational drugs for one week prior to the day of surgery. 5. Leave all valuables, including money/purse, at home. 6. Remove all jewelry, nail polish, makeup (including mascara); no lotions, powders, deodorant, or perfume/cologne/after shave. 7. Glasses/Contact lenses and Dentures may be worn to the hospital.  They will be removed prior to surgery. 8. Call your doctor if symptoms of a cold or illness develop within 24 ours prior to surgery. 9. AN ADULT MUST DRIVE YOU HOME AFTER OUTPATIENT SURGERY. 10. If you are having an OUTPATIENT procedure, please make arrangements for a responsible adult to be with you for 24 hours after your surgery. 11. If you are admitted to the hospital, you will be assigned to a bed after surgery is complete. Normally a family member will not be able to see you until you are in your assigned bed. 12. Visitation Restrictions Explained. Special Instructions:  Covid Test not needed Patient vaccinated.  Vaccination card on [facility] requirements discussed  No advanced Directive or DNR  May take DOS with small sip of water Bupropion, metoprolol tartrate,omeprazole  Follow MD directions for Eliquis  Patient Prep:    use CHG solution     These surgical instructions were reviewed with patient during the PAT phone call

## 2022-06-20 ENCOUNTER — APPOINTMENT (OUTPATIENT)
Dept: GENERAL RADIOLOGY | Age: 62
End: 2022-06-20
Attending: PHYSICIAN ASSISTANT
Payer: MEDICARE

## 2022-06-20 ENCOUNTER — APPOINTMENT (OUTPATIENT)
Dept: GENERAL RADIOLOGY | Age: 62
End: 2022-06-20
Attending: ORTHOPAEDIC SURGERY
Payer: MEDICARE

## 2022-06-20 ENCOUNTER — ANESTHESIA (OUTPATIENT)
Dept: SURGERY | Age: 62
End: 2022-06-20
Payer: MEDICARE

## 2022-06-20 ENCOUNTER — HOSPITAL ENCOUNTER (OUTPATIENT)
Age: 62
Setting detail: OUTPATIENT SURGERY
Discharge: HOME HEALTH | End: 2022-06-20
Attending: ORTHOPAEDIC SURGERY | Admitting: ORTHOPAEDIC SURGERY
Payer: MEDICARE

## 2022-06-20 ENCOUNTER — ANESTHESIA EVENT (OUTPATIENT)
Dept: SURGERY | Age: 62
End: 2022-06-20
Payer: MEDICARE

## 2022-06-20 VITALS
SYSTOLIC BLOOD PRESSURE: 109 MMHG | WEIGHT: 193.1 LBS | RESPIRATION RATE: 14 BRPM | DIASTOLIC BLOOD PRESSURE: 59 MMHG | HEART RATE: 57 BPM | BODY MASS INDEX: 32.97 KG/M2 | HEIGHT: 64 IN | TEMPERATURE: 97.4 F | OXYGEN SATURATION: 94 %

## 2022-06-20 DIAGNOSIS — Z96.641 S/P HIP REPLACEMENT, RIGHT: Primary | ICD-10-CM

## 2022-06-20 LAB
ABO + RH BLD: NORMAL
BLOOD GROUP ANTIBODIES SERPL: NORMAL
SPECIMEN EXP DATE BLD: NORMAL

## 2022-06-20 PROCEDURE — 77030003666 HC NDL SPINAL BD -A: Performed by: ORTHOPAEDIC SURGERY

## 2022-06-20 PROCEDURE — 74011000250 HC RX REV CODE- 250: Performed by: NURSE ANESTHETIST, CERTIFIED REGISTERED

## 2022-06-20 PROCEDURE — 76210000021 HC REC RM PH II 0.5 TO 1 HR: Performed by: ORTHOPAEDIC SURGERY

## 2022-06-20 PROCEDURE — 77030008683 HC TU ET CUF COVD -A: Performed by: SPECIALIST

## 2022-06-20 PROCEDURE — 77030013708 HC HNDPC SUC IRR PULS STRY –B: Performed by: ORTHOPAEDIC SURGERY

## 2022-06-20 PROCEDURE — 74011250636 HC RX REV CODE- 250/636: Performed by: ORTHOPAEDIC SURGERY

## 2022-06-20 PROCEDURE — 74011250636 HC RX REV CODE- 250/636: Performed by: SPECIALIST

## 2022-06-20 PROCEDURE — 77030018835 HC SOL IRR LR ICUM -A: Performed by: ORTHOPAEDIC SURGERY

## 2022-06-20 PROCEDURE — 76060000035 HC ANESTHESIA 2 TO 2.5 HR: Performed by: ORTHOPAEDIC SURGERY

## 2022-06-20 PROCEDURE — 97116 GAIT TRAINING THERAPY: CPT

## 2022-06-20 PROCEDURE — 74011250636 HC RX REV CODE- 250/636: Performed by: NURSE ANESTHETIST, CERTIFIED REGISTERED

## 2022-06-20 PROCEDURE — 77030031139 HC SUT VCRL2 J&J -A: Performed by: ORTHOPAEDIC SURGERY

## 2022-06-20 PROCEDURE — C9290 INJ, BUPIVACAINE LIPOSOME: HCPCS | Performed by: ORTHOPAEDIC SURGERY

## 2022-06-20 PROCEDURE — 77030006643: Performed by: SPECIALIST

## 2022-06-20 PROCEDURE — 74011000250 HC RX REV CODE- 250: Performed by: ORTHOPAEDIC SURGERY

## 2022-06-20 PROCEDURE — 76010000131 HC OR TIME 2 TO 2.5 HR: Performed by: ORTHOPAEDIC SURGERY

## 2022-06-20 PROCEDURE — 77030008477 HC STYL SATN SLP COVD -A: Performed by: SPECIALIST

## 2022-06-20 PROCEDURE — 73501 X-RAY EXAM HIP UNI 1 VIEW: CPT

## 2022-06-20 PROCEDURE — 77030027138 HC INCENT SPIROMETER -A: Performed by: ORTHOPAEDIC SURGERY

## 2022-06-20 PROCEDURE — 76210000006 HC OR PH I REC 0.5 TO 1 HR: Performed by: ORTHOPAEDIC SURGERY

## 2022-06-20 PROCEDURE — 77030002933 HC SUT MCRYL J&J -A: Performed by: ORTHOPAEDIC SURGERY

## 2022-06-20 PROCEDURE — 77030026044 HC TIP IRR PULS STRY -A: Performed by: ORTHOPAEDIC SURGERY

## 2022-06-20 PROCEDURE — 86900 BLOOD TYPING SEROLOGIC ABO: CPT

## 2022-06-20 PROCEDURE — 36415 COLL VENOUS BLD VENIPUNCTURE: CPT

## 2022-06-20 PROCEDURE — 97161 PT EVAL LOW COMPLEX 20 MIN: CPT

## 2022-06-20 PROCEDURE — 77030034479 HC ADH SKN CLSR PRINEO J&J -B: Performed by: ORTHOPAEDIC SURGERY

## 2022-06-20 PROCEDURE — 97535 SELF CARE MNGMENT TRAINING: CPT

## 2022-06-20 PROCEDURE — 74011250637 HC RX REV CODE- 250/637: Performed by: SPECIALIST

## 2022-06-20 PROCEDURE — 77030035643 HC BLD SAW OSC PRECIS STRY -C: Performed by: ORTHOPAEDIC SURGERY

## 2022-06-20 PROCEDURE — 77030040361 HC SLV COMPR DVT MDII -B: Performed by: ORTHOPAEDIC SURGERY

## 2022-06-20 PROCEDURE — C1776 JOINT DEVICE (IMPLANTABLE): HCPCS | Performed by: ORTHOPAEDIC SURGERY

## 2022-06-20 PROCEDURE — 74011000250 HC RX REV CODE- 250: Performed by: PHYSICIAN ASSISTANT

## 2022-06-20 PROCEDURE — 74011000258 HC RX REV CODE- 258: Performed by: ORTHOPAEDIC SURGERY

## 2022-06-20 PROCEDURE — 77030020782 HC GWN BAIR PAWS FLX 3M -B: Performed by: ORTHOPAEDIC SURGERY

## 2022-06-20 PROCEDURE — 77030012712 HC WND ARTHRO ABLT S&N -E: Performed by: ORTHOPAEDIC SURGERY

## 2022-06-20 PROCEDURE — 77030018673: Performed by: ORTHOPAEDIC SURGERY

## 2022-06-20 PROCEDURE — 2709999900 HC NON-CHARGEABLE SUPPLY: Performed by: ORTHOPAEDIC SURGERY

## 2022-06-20 PROCEDURE — 97165 OT EVAL LOW COMPLEX 30 MIN: CPT

## 2022-06-20 DEVICE — IMPLANTABLE DEVICE: Type: IMPLANTABLE DEVICE | Site: HIP | Status: FUNCTIONAL

## 2022-06-20 DEVICE — COMPONENT TOT HIP CAPPED H2 ADV: Type: IMPLANTABLE DEVICE | Site: HIP | Status: FUNCTIONAL

## 2022-06-20 DEVICE — LINER ACET NEUT 36X52 MM HIP GRP 5 XLE ALTEON: Type: IMPLANTABLE DEVICE | Site: HIP | Status: FUNCTIONAL

## 2022-06-20 DEVICE — HEAD FEM TAPR 3.5- MM 36 MM HIP BIOLOX DELT STRL: Type: IMPLANTABLE DEVICE | Site: HIP | Status: FUNCTIONAL

## 2022-06-20 RX ORDER — HYDROMORPHONE HYDROCHLORIDE 1 MG/ML
0.5 INJECTION, SOLUTION INTRAMUSCULAR; INTRAVENOUS; SUBCUTANEOUS
Status: DISCONTINUED | OUTPATIENT
Start: 2022-06-20 | End: 2022-06-20 | Stop reason: HOSPADM

## 2022-06-20 RX ORDER — SODIUM CHLORIDE, SODIUM LACTATE, POTASSIUM CHLORIDE, CALCIUM CHLORIDE 600; 310; 30; 20 MG/100ML; MG/100ML; MG/100ML; MG/100ML
125 INJECTION, SOLUTION INTRAVENOUS CONTINUOUS
Status: DISCONTINUED | OUTPATIENT
Start: 2022-06-20 | End: 2022-06-20 | Stop reason: HOSPADM

## 2022-06-20 RX ORDER — ROCURONIUM BROMIDE 10 MG/ML
INJECTION, SOLUTION INTRAVENOUS AS NEEDED
Status: DISCONTINUED | OUTPATIENT
Start: 2022-06-20 | End: 2022-06-20 | Stop reason: HOSPADM

## 2022-06-20 RX ORDER — DEXAMETHASONE SODIUM PHOSPHATE 4 MG/ML
INJECTION, SOLUTION INTRA-ARTICULAR; INTRALESIONAL; INTRAMUSCULAR; INTRAVENOUS; SOFT TISSUE AS NEEDED
Status: DISCONTINUED | OUTPATIENT
Start: 2022-06-20 | End: 2022-06-20 | Stop reason: HOSPADM

## 2022-06-20 RX ORDER — ONDANSETRON 2 MG/ML
INJECTION INTRAMUSCULAR; INTRAVENOUS AS NEEDED
Status: DISCONTINUED | OUTPATIENT
Start: 2022-06-20 | End: 2022-06-20 | Stop reason: HOSPADM

## 2022-06-20 RX ORDER — CEFAZOLIN SODIUM/WATER 2 G/20 ML
2 SYRINGE (ML) INTRAVENOUS ONCE
Status: COMPLETED | OUTPATIENT
Start: 2022-06-20 | End: 2022-06-20

## 2022-06-20 RX ORDER — MIDAZOLAM HYDROCHLORIDE 1 MG/ML
INJECTION, SOLUTION INTRAMUSCULAR; INTRAVENOUS AS NEEDED
Status: DISCONTINUED | OUTPATIENT
Start: 2022-06-20 | End: 2022-06-20 | Stop reason: HOSPADM

## 2022-06-20 RX ORDER — EPHEDRINE SULFATE/0.9% NACL/PF 50 MG/5 ML
SYRINGE (ML) INTRAVENOUS AS NEEDED
Status: DISCONTINUED | OUTPATIENT
Start: 2022-06-20 | End: 2022-06-20 | Stop reason: HOSPADM

## 2022-06-20 RX ORDER — HYDROMORPHONE HYDROCHLORIDE 1 MG/ML
INJECTION, SOLUTION INTRAMUSCULAR; INTRAVENOUS; SUBCUTANEOUS AS NEEDED
Status: DISCONTINUED | OUTPATIENT
Start: 2022-06-20 | End: 2022-06-20 | Stop reason: HOSPADM

## 2022-06-20 RX ORDER — TRANEXAMIC ACID 10 MG/ML
1 INJECTION, SOLUTION INTRAVENOUS ONCE
Status: COMPLETED | OUTPATIENT
Start: 2022-06-20 | End: 2022-06-20

## 2022-06-20 RX ORDER — PROPOFOL 10 MG/ML
INJECTION, EMULSION INTRAVENOUS AS NEEDED
Status: DISCONTINUED | OUTPATIENT
Start: 2022-06-20 | End: 2022-06-20 | Stop reason: HOSPADM

## 2022-06-20 RX ORDER — FENTANYL CITRATE 50 UG/ML
25 INJECTION, SOLUTION INTRAMUSCULAR; INTRAVENOUS
Status: DISCONTINUED | OUTPATIENT
Start: 2022-06-20 | End: 2022-06-20 | Stop reason: HOSPADM

## 2022-06-20 RX ORDER — SODIUM CHLORIDE, SODIUM LACTATE, POTASSIUM CHLORIDE, CALCIUM CHLORIDE 600; 310; 30; 20 MG/100ML; MG/100ML; MG/100ML; MG/100ML
50 INJECTION, SOLUTION INTRAVENOUS CONTINUOUS
Status: DISCONTINUED | OUTPATIENT
Start: 2022-06-20 | End: 2022-06-20 | Stop reason: HOSPADM

## 2022-06-20 RX ORDER — LIDOCAINE HYDROCHLORIDE 20 MG/ML
INJECTION, SOLUTION EPIDURAL; INFILTRATION; INTRACAUDAL; PERINEURAL AS NEEDED
Status: DISCONTINUED | OUTPATIENT
Start: 2022-06-20 | End: 2022-06-20 | Stop reason: HOSPADM

## 2022-06-20 RX ORDER — NALOXONE HYDROCHLORIDE 0.4 MG/ML
0.1 INJECTION, SOLUTION INTRAMUSCULAR; INTRAVENOUS; SUBCUTANEOUS
Status: DISCONTINUED | OUTPATIENT
Start: 2022-06-20 | End: 2022-06-20 | Stop reason: HOSPADM

## 2022-06-20 RX ORDER — ACETAMINOPHEN 500 MG
1000 TABLET ORAL ONCE
Status: COMPLETED | OUTPATIENT
Start: 2022-06-20 | End: 2022-06-20

## 2022-06-20 RX ORDER — CEPHALEXIN 500 MG/1
500 CAPSULE ORAL 4 TIMES DAILY
Qty: 8 CAPSULE | Refills: 0 | Status: SHIPPED | OUTPATIENT
Start: 2022-06-20 | End: 2022-06-22

## 2022-06-20 RX ORDER — GLYCOPYRROLATE 0.2 MG/ML
INJECTION INTRAMUSCULAR; INTRAVENOUS AS NEEDED
Status: DISCONTINUED | OUTPATIENT
Start: 2022-06-20 | End: 2022-06-20 | Stop reason: HOSPADM

## 2022-06-20 RX ORDER — ONDANSETRON 2 MG/ML
4 INJECTION INTRAMUSCULAR; INTRAVENOUS ONCE
Status: COMPLETED | OUTPATIENT
Start: 2022-06-20 | End: 2022-06-20

## 2022-06-20 RX ORDER — FENTANYL CITRATE 50 UG/ML
INJECTION, SOLUTION INTRAMUSCULAR; INTRAVENOUS AS NEEDED
Status: DISCONTINUED | OUTPATIENT
Start: 2022-06-20 | End: 2022-06-20 | Stop reason: HOSPADM

## 2022-06-20 RX ORDER — OXYCODONE HYDROCHLORIDE 5 MG/1
5 TABLET ORAL
Status: COMPLETED | OUTPATIENT
Start: 2022-06-20 | End: 2022-06-20

## 2022-06-20 RX ORDER — OXYCODONE AND ACETAMINOPHEN 5; 325 MG/1; MG/1
1-2 TABLET ORAL
Qty: 40 TABLET | Refills: 0 | Status: SHIPPED | OUTPATIENT
Start: 2022-06-20 | End: 2022-06-25

## 2022-06-20 RX ADMIN — ROCURONIUM BROMIDE 10 MG: 10 INJECTION, SOLUTION INTRAVENOUS at 12:29

## 2022-06-20 RX ADMIN — MIDAZOLAM 2 MG: 1 INJECTION INTRAMUSCULAR; INTRAVENOUS at 11:27

## 2022-06-20 RX ADMIN — ROCURONIUM BROMIDE 10 MG: 10 INJECTION, SOLUTION INTRAVENOUS at 12:39

## 2022-06-20 RX ADMIN — FENTANYL CITRATE 100 MCG: 50 INJECTION, SOLUTION INTRAMUSCULAR; INTRAVENOUS at 11:27

## 2022-06-20 RX ADMIN — PROPOFOL 130 MG: 10 INJECTION, EMULSION INTRAVENOUS at 11:34

## 2022-06-20 RX ADMIN — SODIUM CHLORIDE, SODIUM LACTATE, POTASSIUM CHLORIDE, AND CALCIUM CHLORIDE 125 ML/HR: 600; 310; 30; 20 INJECTION, SOLUTION INTRAVENOUS at 08:39

## 2022-06-20 RX ADMIN — SUGAMMADEX 200 MG: 100 INJECTION, SOLUTION INTRAVENOUS at 13:31

## 2022-06-20 RX ADMIN — Medication 2 G: at 11:54

## 2022-06-20 RX ADMIN — SODIUM CHLORIDE, SODIUM LACTATE, POTASSIUM CHLORIDE, AND CALCIUM CHLORIDE: 600; 310; 30; 20 INJECTION, SOLUTION INTRAVENOUS at 13:44

## 2022-06-20 RX ADMIN — TRANEXAMIC ACID 1 G: 10 INJECTION, SOLUTION INTRAVENOUS at 13:34

## 2022-06-20 RX ADMIN — ONDANSETRON 4 MG: 2 INJECTION INTRAMUSCULAR; INTRAVENOUS at 14:05

## 2022-06-20 RX ADMIN — LIDOCAINE HYDROCHLORIDE 60 MG: 20 INJECTION, SOLUTION INTRAVENOUS at 11:34

## 2022-06-20 RX ADMIN — Medication 10 MG: at 12:37

## 2022-06-20 RX ADMIN — ACETAMINOPHEN 1000 MG: 500 TABLET ORAL at 10:26

## 2022-06-20 RX ADMIN — TRANEXAMIC ACID 1 G: 10 INJECTION, SOLUTION INTRAVENOUS at 11:39

## 2022-06-20 RX ADMIN — Medication 10 MG: at 11:37

## 2022-06-20 RX ADMIN — ONDANSETRON HYDROCHLORIDE 4 MG: 2 INJECTION INTRAMUSCULAR; INTRAVENOUS at 13:34

## 2022-06-20 RX ADMIN — HYDROMORPHONE HYDROCHLORIDE 0.5 MG: 1 INJECTION, SOLUTION INTRAMUSCULAR; INTRAVENOUS; SUBCUTANEOUS at 12:07

## 2022-06-20 RX ADMIN — DEXAMETHASONE SODIUM PHOSPHATE 8 MG: 4 INJECTION, SOLUTION INTRAMUSCULAR; INTRAVENOUS at 11:40

## 2022-06-20 RX ADMIN — Medication 10 MG: at 11:33

## 2022-06-20 RX ADMIN — GLYCOPYRROLATE 0.2 MG: 0.2 INJECTION INTRAMUSCULAR; INTRAVENOUS at 11:39

## 2022-06-20 RX ADMIN — HYDROMORPHONE HYDROCHLORIDE 0.5 MG: 1 INJECTION, SOLUTION INTRAMUSCULAR; INTRAVENOUS; SUBCUTANEOUS at 12:46

## 2022-06-20 RX ADMIN — ROCURONIUM BROMIDE 50 MG: 10 INJECTION, SOLUTION INTRAVENOUS at 11:34

## 2022-06-20 RX ADMIN — OXYCODONE 5 MG: 5 TABLET ORAL at 14:49

## 2022-06-20 NOTE — ANESTHESIA POSTPROCEDURE EVALUATION
Procedure(s):  RIGHT TOTAL HIP REPLACEMENT ANTERIOR APPROACH WITH C-ARM. general    Anesthesia Post Evaluation        Comments: Post-Anesthesia Evaluation and Assessment    Cardiovascular Function/Vital Signs  /64   Pulse (!) 55   Temp 36.3 °C (97.4 °F)   Resp 10   Ht 5' 4\" (1.626 m)   Wt 87.6 kg (193 lb 1.6 oz)   SpO2 97%   BMI 33.15 kg/m²     Patient is status post Procedure(s):  RIGHT TOTAL HIP REPLACEMENT ANTERIOR APPROACH WITH C-ARM. Nausea/Vomiting: Controlled. Postoperative hydration reviewed and adequate. Pain:  Pain Scale 1: FLACC (06/20/22 1417)  Pain Intensity 1: 0 (06/20/22 1417)   Managed. Neurological Status:   Neuro (WDL): Exceptions to WDL (06/20/22 1417)   At baseline. Mental Status and Level of Consciousness: Arousable. Pulmonary Status:   O2 Device: Nasal cannula (06/20/22 1407)   Adequate oxygenation and airway patent. Complications related to anesthesia: None    Post-anesthesia assessment completed. No concerns. Patient has met all discharge requirements. Signed By: Nithya Adams MD    June 20, 2022                   INITIAL Post-op Vital signs:   Vitals Value Taken Time   /63 06/20/22 1427   Temp 36.3 °C (97.4 °F) 06/20/22 1347   Pulse 73 06/20/22 1429   Resp 18 06/20/22 1429   SpO2 94 % 06/20/22 1429   Vitals shown include unvalidated device data.

## 2022-06-20 NOTE — ADDENDUM NOTE
Addendum  created 06/20/22 1435 by Lulu Miguel CRNA    Intraprocedure Meds edited, Orders acknowledged in Narrator

## 2022-06-20 NOTE — PROGRESS NOTES
Problem: Self Care Deficits Care Plan (Adult)  Goal: *Acute Goals and Plan of Care (Insert Text)  Description: Initial Occupational Therapy Goals (6/20/2022) Within 7 day(s):    1. Patient will perform grooming standing sinkside with supervision for increased independence with ADLs. 2. Patient will perform LB dressing with supervision & A/E PRN for increased independence with ADLs. 3. Patient will perform toilet transfer with supervision for increased independence with ADLs. 4. Patient will perform all aspects of toileting with supervision for increased independence with ADLs. 5. Patient will independently apply energy conservation techniques with 1 verbal cue(s)for increased independence with ADLs. 6. Patient will perform bathroom mobility with supervision for increased independence/safety with ADLs. Outcome: Progressing Towards Goal  OCCUPATIONAL THERAPY EVALUATION    Patient: Naeem Lopez (18 y.o. female)  Date: 6/20/2022  Primary Diagnosis: RIGHT HIP OSTEOARTHRITIS  Procedure(s) (LRB):  RIGHT TOTAL HIP REPLACEMENT ANTERIOR APPROACH WITH C-ARM (Right) Day of Surgery   Precautions: Fall,WBAT  PLOF: pt mod I for ADLs/functional mobility    ASSESSMENT AND RECOMMENDATIONS:  Based on the objective data described below, the patient presents with RLE decreased ROM and strength affecting LE ADLs. Pt found seated in recliner chair, vitals assessed and WNL, pt reporting pain 6/10, agreeable to therapy. Educated pt on proper body mechanics for ADLs s/p THR. Pt completed upper body dressing with supervision seated in chair. Pt able to thread B feet through underwear without assist, and CGA when standing to pull up to waist. Pt CGA/SBA for STS/bathroom mobility with vc for safe use of RW. Pt ambulated back to recliner, ice applied to R hip. Spouse present during session for education on home safety. Provided opportunity for pt to voice questions on ADL performance when home, pt has no further concerns.  Patient will benefit from skilled Occupational Therapy intervention to maximize safety/independence with ADLs at d/c.    Education: Reviewed home safety, body mechanics, importance of moving every hour to prevent joint stiffness, role of ice for edema/pain control, Rolling Walker management/safety, and adaptive dressing techniques with patient verbalizing  understanding at this time     Patient will benefit from skilled intervention to address the above impairments. Patient's rehabilitation potential is considered to be Good  Factors which may influence rehabilitation potential include:   [x]             None noted  []             Mental ability/status  []             Medical condition  []             Home/family situation and support systems  []             Safety awareness  []             Pain tolerance/management  []             Other:        PLAN :  Recommendations and Planned Interventions:   [x]               Self Care Training                  [x]      Therapeutic Activities  [x]               Functional Mobility Training   []      Cognitive Retraining  []               Therapeutic Exercises           []      Endurance Activities  []               Balance Training                    []      Neuromuscular Re-Education  []               Visual/Perceptual Training     [x]      Home Safety Training  [x]               Patient Education                   [x]      Family Training/Education  []               Other (comment):    Frequency/Duration: Patient will be followed by Occupational Therapy 1-2 times per day/4-7 days per week to address goals. Discharge Recommendations: Home health with adult supervision at least 24 hours after d/c  Further Equipment Recommendations for Discharge: N/A     SUBJECTIVE:   Patient stated i'm doing pretty good.     OBJECTIVE DATA SUMMARY:     Past Medical History:   Diagnosis Date    A-fib ([facility]     Acid reflux     Arthritis     [facility]     fibromalagia    Back pain Chronic systolic CHF (congestive heart failure) (HCC)     Diabetes (HCC)     prediabetic    Fibromyalgia     GERD (gastroesophageal reflux disease)     Hypertension     Neuropathy     Psychiatric disorder     Sepsis ([facility] [address]      Past Surgical History:   Procedure Laterality Date    HX HERNIA REPAIR      HX HERNIA REPAIR      HX HERNIA REPAIR       Barriers to Learning/Limitations: yes;  physical, post-anesthesia  Compensate with: visual, verbal, tactile, kinesthetic cues/model    Home Situation/Prior Level of Function:   Home Situation  Home Environment: Private residence  # Steps to Enter: 5  Rails to Enter: Yes  Hand Rails : Bilateral  One/Two Story Residence: One story  Living Alone: No  Support Systems: Spouse/Significant Other  Patient Expects to be Discharged to[de-identified] Home with home health  Current DME Used/Available at Home: Walker, rolling,Cane, straight  Tub or Shower Type: Shower  []  Right hand dominant   []  Left hand dominant    Cognitive/Behavioral Status:  Neurologic State: Alert  Orientation Level: Oriented to person;Oriented to place;Oriented to situation  Cognition: Follows commands  Safety/Judgement: Awareness of environment    Skin: R hip incision w/ Mepilex   Edema: compression hose in place & applied ice     Coordination: BUE  Coordination: Within functional limits  Fine Motor Skills-Upper: Left Intact; Right Intact    Gross Motor Skills-Upper: Left Intact; Right Intact    Balance:  Sitting: Intact  Standing: Intact; With support    Strength: BUE  Strength: Generally decreased, functional     Tone & Sensation:BUE  Tone: Normal  Sensation: Impaired (R hip)    Range of Motion: BUE  AROM: Generally decreased, functional    Functional Mobility and Transfers for ADLs:  Bed Mobility:  Scooting: Stand-by assistance  Transfers:  Sit to Stand: Contact guard assistance;Stand-by assistance (vc)    ADL Assessment:  Feeding: Independent  Oral Facial Hygiene/Grooming: Stand-by assistance  Bathing: Minimum assistance  Upper Body Dressing: Supervision  Lower Body Dressing: Minimum assistance  Toileting: Stand by assistance    ADL Intervention:  Upper Body Dressing Assistance  Dressing Assistance: Supervision  Pullover Shirt: Supervision    Lower Body Dressing Assistance  Dressing Assistance: Contact guard assistance  Underpants: Contact guard assistance  Leg Crossed Method Used: No  Position Performed: Seated in chair  Cues: Verbal cues provided;Visual cues provided    Cognitive Retraining  Safety/Judgement: Awareness of environment    Pain:  Pain level pre-treatment: 6/10  Pain level post-treatment: 6/10  Pain Intervention(s): Rest, Ice, Repositioning   Response to intervention: Nurse notified, see doc flow     Activity Tolerance:   Fair. Patient able to stand ~5 minute(s). Patient able to complete ADLs with intermittent rest breaks. Patient limited by pain, strength, ROM. Patient unsteady. Please refer to the flowsheet for vital signs taken during this treatment. After treatment:   [x]  Patient left in no apparent distress sitting up in chair  []  Patient sitting on EOB  []  Patient left in no apparent distress in bed  [x]  Call bell left within reach  [x]  Nursing notified  [x]  Caregiver present  [x]  Ice applied  []  SCD's on while back in bed  [] Bed alarm activated    COMMUNICATION/EDUCATION:   Communication/Collaboration:  [x]       Role of Occupational Therapy in the acute care setting. [x]      Home safety education was provided and the patient/caregiver indicated understanding. [x]      Patient/family have participated as able in goal setting and plan of care. [x]      Patient/family agree to work toward stated goals and plan of care. []      Patient understands intent and goals of therapy, but is neutral about his/her participation. []      Patient is unable to participate in plan of care at this time.     Thank you for this referral.  Caron Martinez, OTR/L  Time Calculation: 33 mins    Michele Complexity: History: MEDIUM Complexity : Expanded review of history including physical, cognitive and psychosocial  history ; Examination: LOW Complexity : 1-3 performance deficits relating to physical, cognitive , or psychosocial skils that result in activity limitations and / or participation restrictions ;    Decision Making:LOW Complexity : No comorbidities that affect functional and no verbal or physical assistance needed to complete eval tasks

## 2022-06-20 NOTE — BRIEF OP NOTE
Brief Postoperative Note    Patient: Maria L Chamberlain  YOB: 1960  MRN: [id number]    Date of Procedure: 6/20/2022     Pre-Op Diagnosis: RIGHT HIP OSTEOARTHRITIS    Post-Op Diagnosis: Same as preoperative diagnosis. Procedure(s):  RIGHT TOTAL HIP REPLACEMENT ANTERIOR APPROACH WITH C-ARM    Surgeon(s):  Josh Wallis MD    Surgical Assistant: Physician Assistant: Adri Soto PA-C    Anesthesia: General     Estimated Blood Loss (mL): 809    Complications: None    Specimens: * No specimens in log *     Implants:   Implant Name Type Inv.  Item Serial No.  Lot No. LRB No. Used Action   SHELL ACET CLUS H 52 MM HIP GRP 5 CUP [employer] - [id number]  SHELL ACET CLUS H 52 MM HIP GRP 5 CUP [employer] [id number] [employer]  Right 1 Implanted   SCREW BONE L20MM DIA6.5MM FOR NOVATION CRWN CUP ACET SHELL - [id number]  SCREW BONE L20MM DIA6.5MM FOR NOVATION CRWN CUP ACET SHELL [id number] [employer]  Right 1 Implanted   LINER ACET NEUT 36X52 MM HIP GRP 5 XLE [employer] - [id number]  LINER ACET NEUT 36X52 MM HIP GRP 5 XLE [employer] [id number] [employer]  Right 1 Implanted   HEAD FEM TAPR 3.5- MM 36 MM HIP BIOLOX DELT STRL - [id number]  HEAD FEM TAPR 3.5- MM 36 MM HIP BIOLOX DELT STRL [id number] [employer]  Right 1 Implanted   ALTEON FEMORAL STEM STANDARD OFFSET   [id number] [employer]  Right 1 Implanted       Drains: * No LDAs found *    Findings: Severe DJD    Electronically Signed by August Goltz, PA-C on 6/20/2022 at 1:40 PM

## 2022-06-20 NOTE — PERIOP NOTES
Reviewed PTA medication list with patient/caregiver and patient/caregiver denies any additional medications. Patient admits to having a responsible adult care for them at home for at least 24 hours after surgery. Patient encouraged to use gown warming system and informed that using said warming gown to regulate body temperature prior to a procedure has been shown to help reduce the risks of blood clots and infection. Patient's pharmacy of choice verified and documented in PTA medication section. Dual skin assessment & fall risk band verification completed with CHLOE Woods RN.

## 2022-06-20 NOTE — OP NOTES
[address] [address]  Total Right Hip Arthroplasty      Patient: Margaret Correia MRN: [id number]  SSN: [id number]    YOB: 1960  Age: 58 y.o. Sex: female      Date of Surgery: 6/20/2022   Preoperative Diagnosis: RIGHT HIP OSTEOARTHRITIS   Postoperative Diagnosis: RIGHT HIP OSTEOARTHRITIS   Location: [facility]  Surgeon: Abdon Justin MD  Assistant:    Alex BUSH    Anesthesia: General     Procedure: Total Right Hip Arthroplasty    Findings:  Degenerative joint disease of the right hip. Estimated Blood Loss: 350 cc    Specimens: None    Complications: None    Implants:   Implant Name Type Inv. Item Serial No.  Lot No. LRB No. Used Action   SHELL ACET CLUS H 52 MM HIP GRP 5 CUP ALTEON - [id number]  SHELL ACET CLUS H 52 MM HIP GRP 5 CUP ALTEON [id number] [employer]  Right 1 Implanted   SCREW BONE L20MM DIA6.5MM FOR NOVATION CRWN CUP ACET SHELL - [id number]  SCREW BONE L20MM DIA6.5MM FOR NOVATION CRWN CUP ACET SHELL [id number] [employer]  Right 1 Implanted   LINER ACET NEUT 36X52 MM HIP GRP 5 XLE ALTEON - [id number]  LINER ACET NEUT 36X52 MM HIP GRP 5 XLE ALTEON [id number] [employer]  Right 1 Implanted   HEAD FEM TAPR 3.5- MM 36 MM HIP BIOLOX DELT STRL - [id number]  HEAD FEM TAPR 3.5- MM 36 MM HIP BIOLOX DELT STRL [id number] [employer]  Right 1 Implanted   ALTEON FEMORAL STEM STANDARD OFFSET   [id number] [employer]  Right 1 Implanted       Procedure Detail:  After the patient was brought to the operating suite, She was effectively anesthetized using general anesthesia, then transferred to the [place] table and secured in a standard fashion. Her right hip was then prepped with Chloroprep and draped in a normal sterile orthopedic fashion. She was given appropriate intravenous antibiotic preoperatively.  After proper site indication was performed, a direct anterior approach to the hip was performed using a short Capps-Arreola interval. The incision was placed approximately 3 cm lateral to the anterior superior iliac spine and progressed distally and laterally for a length of approximately 4 inches. Incision was made through the Tensor Fascia Aster with the knife and continued with the Randolph scissors. An Allis clamp was placed on the medial border of the Tensor Fascia Aster and disection continued on the medial border of Tensor Fascia Aster Muscle. Dissection was continued down to the lateral hip capsule. A Cobra retractor was placed on the lateral hip capsule. A Henry Retractor was placed distally and a second Cobra retractor was placed on the medial hip capsule. The Lateral Femoral Circumflex Vessels were identified clamped and cauterized. Anterior capsulotomy was performed. Portions of the capsule were removed. The Cobra retractors were then placed on the femoral neck. The degenerative changes of the hip were noted. Femoral neck osteotomy was then performed. The head and neck were removed. The neck length was confirmed with the image intensification. The labrum was excised. The acetabulum was then reamed up to 52 mm with good bleeding bone in all quadrants obtained. The cup was then irrigated with pulse lavage system. A 52 mm [employer] Cluster hole cup was then impacted in place with excellent stable fixation obtained, placing the cup at about 45 degrees of abduction, 20 degrees of anteversion. one screw placed. The 36 mm inner diameter polyethylene liner was then impacted in place. Attention was turned to the femur, which was delivered into the wound with a combination of extension, external rotation, and adduction, and using the hook on the Stewart table to deliver the femur into the wound. The box osteotome was used followed by the canal finder and the lateralizing broach. The canal was broached up to a size 6. A trial reduction was then performed with the standard neck offset and negative 3.5 mm head trial. This was evaluated for leg length and canal fill. The broach was removed. Broaching then proceeded up to a size 7. This broach was removed. The canal was irrigated with the pulse lavage system. The size 7 stem was impacted into position with excellent stability being obtained. The -3.5x36 ceramic head was impacted into position after drying the wilson taper with a sponge. The final reduction was performed and once again leg lengths and offset were restored radiographically, using the C-arm Excellent functional stability was noted. Final irrigation was done at this time. Exparel was injected into the periosteum, muscle and subcutaneous tissue. The wound was closed in layers. The tensor fascia corby was closed with #1 Vicryl in a running type stitch. Subcutaneous tissue was closed with 2-0 Vicryl in a simple buried stitch, and the skin was closed with a subcuticular 3-0 monocryl followed by the Prineo system. Mepilex dressing was then applied. She tolerated this well, was transferred to the recovery room bed, and taken to recovery room in stable condition. All sponge and needle counts were correct.     Signed By: Zeinab Jung MD     June 20, 2022

## 2022-06-20 NOTE — ANESTHESIA PREPROCEDURE EVALUATION
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)       Anesthetic History   No history of anesthetic complications            Review of Systems / Medical History  Patient summary reviewed, nursing notes reviewed and pertinent labs reviewed    Pulmonary              Pertinent negatives: No smoker (quit tobacco 1 year ago)     Neuro/Psych   Within defined limits           Cardiovascular    Hypertension        Dysrhythmias (currently sinus) : atrial fibrillation      Exercise tolerance: >4 METS  Comments: Negative echo stress - EF excellent   GI/Hepatic/Renal     GERD           Endo/Other    Diabetes (\"pre diabetes\" no meds): type 2    Arthritis     Other Findings            Physical Exam    Airway  Mallampati: II  TM Distance: 4 - 6 cm  Neck ROM: normal range of motion   Mouth opening: Normal     Cardiovascular               Dental    Dentition: Poor dentition  Comments: Chipped teeth   Pulmonary                 Abdominal         Other Findings            Anesthetic Plan    ASA: 2  Anesthesia type: general          Induction: Intravenous  Anesthetic plan and risks discussed with: Patient

## 2022-06-20 NOTE — DISCHARGE INSTRUCTIONS
Total Hip Arthroplasty Discharge Instructions   Maxi Lorenz M.D. Please take the time to review the following instructions before you leave the hospital and use them as guidelines during your recovery from surgery. If you have any questions you may contact my office at [phone]. Wound Care / Dressing Changes:     Two days after your surgery date you should remove your dressing. A big, bulky dressing isn't necessary as long as there isn't any drainage from the incisions. If there is drainage you can put a band-aid, Primapore, or Mepilex dressing over the incision and change it daily until drainage stops. No dressing is necessary if there is no drainage. It isn't necessary to apply antibiotic ointment to your incisions. Prineo tape will peel off in approximately 2-6 weeks. It does not need to be removed prior to that. When it begins to peel off you can cut the edges away with scissors. Dyann Gutter / Bathing: You may only shower. You may shower if there is no drainage from your incisions. Your dressing may be removed for showering. You may get your incisions wet in the shower. Don't vigorously scrub the area where your incisions are. Apply a clean, dry dressing after drying off the area of your incisions. Don't take a tub bath, get in a swimming pool or Jacuzzi until the incisions are completely healed. Do not soak your incisions under water. Weight Bearing Status / Braces:     Weight bearing is as tolerated. Use crutches, walker, or cane as needed for support. You may move your joints as much as tolerated. Home Health:    Home health has been arranged for skilled nursing visits and physical therapy. Your home health has been set up through____________ . If no one from the agency calls you on the day after you arrive home, please contact them at the number provided at discharge. PT, gait training and strengthening. Continue therapeutic exercises.      Physical Therapy:       Begin In-Home Physical Therapy; 3 times a week to work on gait training, range of motion, strengthening, and weight bearing exercises as tolerable. Continue to use your walker or cane when walking; may progress from the walker to a cane to complete total bearing as tolerable. Ice / Elevation:     Continue ice and elevation as needed for pain and swelling. Diet:     Resume your prehospital diet. If you have excessive nausea or vomiting call your doctor's office     Medication      1. You will be given a prescription for pain medication when you are discharged for the hospital. Take the medication as needed according to the directions on the prescription bottle. Possible side effects ofthe medication include dizziness, headache, nausea, vomiting, constipation and urinary retention. If you experience any ofthese side effects call the office so that we can assist you in relieving them. Discontinue the use ofthe pain medication if you develop itching, rash, shortness ofbreath or difficulties swallowing. If these symptoms become severe or aren't relieved by discontinuing the medication you should seek immediate medical attention. Refills of pain medication are authorized during office hours only. (8am - 5pm Mon. thru Fri.)     1. You may resume the medication you were taking prior to your surgery. Pain medication may change the effects of any antidepressant medication you are taking. Ifyou have any questions about possible interactions between your regular medications and the pain medication you should consult the physician who prescribes your regular medications. 2. Other medication notes:      Blood Thinner: You will continue your Eliquis regimen for anticoagulation following surgery to prevent blood clots. Follow Up Appointment:   Please call [phone] for a follow appointment with Dr. Audra Haskins in 10-14 days from the time of your surgery.  Please let our office know you are scheduling a post-op appointment. Signs and Symptoms to be Aware of: If any of the following signs and symptoms occur, you should contact Dr. Cristino Murphy office. Please be advised if a problem arises which you feel requires immediate medical attention or you are unable to contact Dr. Cristino Murphy office you should seek immediate medical attention at the emergency department or other health care facility you have access to. Signs and symptoms to watch for include:     1. A sudden increase in swelling and l or redness or warmth at the area your surgery was performed which isn't relieved by rest, ice and elevation. 2 Oral temperature greater than 101.5 degrees for 12 hours or more which isn't relieved by an increase in fluid intake and taking two Tylenol every 4-6 hours. 3 Excessive drainage from your incisions, or drainage which hasn't stopped by 72 hours after your surgery despite applying a compressive dressing, ice and elevation. 4 Calfpain, tenderness, redness or swelling which isn't relieved with rest and elevation. 5 Fever, chills, shortness ofbreath, chest pain, nausea, vomiting or other signs and symptoms which are of concern to you. Other Instructions:      DISCHARGE SUMMARY from Nurse    PATIENT INSTRUCTIONS:    After general anesthesia or intravenous sedation, for 24 hours or while taking prescription Narcotics:  · Limit your activities  · Do not drive and operate hazardous machinery  · Do not make important personal or business decisions  · Do  not drink alcoholic beverages  · If you have not urinated within 8 hours after discharge, please contact your surgeon on call.     Report the following to your surgeon:  · Excessive pain, swelling, redness or odor of or around the surgical area  · Temperature over 100.5  · Nausea and vomiting lasting longer than 4 hours or if unable to take medications  · Any signs of decreased circulation or nerve impairment to extremity: change in color, persistent  numbness, tingling, coldness or increase pain  · Any questions    What to do at Home:  Recommended activity: Activity as tolerated and no driving for today,     If you experience any of the following symptoms as noted above, please follow up with Dr. Cara Salazar. *  Please give a list of your current medications to your Primary Care Provider. *  Please update this list whenever your medications are discontinued, doses are      changed, or new medications (including over-the-counter products) are added. *  Please carry medication information at all times in case of emergency situations. These are general instructions for a healthy lifestyle:    No smoking/ No tobacco products/ Avoid exposure to second hand smoke  Surgeon General's Warning:  Quitting smoking now greatly reduces serious risk to your health. Obesity, smoking, and sedentary lifestyle greatly increases your risk for illness    A healthy diet, regular physical exercise & weight monitoring are important for maintaining a healthy lifestyle    You may be retaining fluid if you have a history of heart failure or if you experience any of the following symptoms:  Weight gain of 3 pounds or more overnight or 5 pounds in a week, increased swelling in our hands or feet or shortness of breath while lying flat in bed. Please call your doctor as soon as you notice any of these symptoms; do not wait until your next office visit. The discharge information has been reviewed with the patient and caregiver. The patient and caregiver verbalized understanding. Discharge medications reviewed with the patient and caregiver and appropriate educational materials and side effects teaching were provided.   ___________________________________________________________________________________________________________________________________

## 2022-06-20 NOTE — H&P
[address] [facility] [address]  History and Physical Exam    Patient: Francheska Vargas MRN: [id number]  SSN: [id number]    YOB: 1960  Age: 58 y.o. Sex: female      Subjective:      Chief Complaint: right hip pain    History of Present Illness:  Patient complains of right hip pain and difficulty ambulating. Past Medical History:   Diagnosis Date    A-fib ([facility] [address]     Acid reflux     Arthritis     Autoimmune disease (HCC)     fibromalagia    Back pain     Chronic systolic CHF (congestive heart failure) (HCC)     Diabetes (HCC)     prediabetic    Fibromyalgia     Fibromyalgia     GERD (gastroesophageal reflux disease)     Hypertension     Neuropathy     Psychiatric disorder     Sepsis ([facility] [address]      Past Surgical History:   Procedure Laterality Date    HX HERNIA REPAIR      HX HERNIA REPAIR      HX HERNIA REPAIR       Social History     Occupational History    Not on file   Tobacco Use    Smoking status: Former Smoker     Quit date: 2019     Years since quitting: 3.4    Smokeless tobacco: Never Used   Vaping Use    Vaping Use: Never used   Substance and Sexual Activity    Alcohol use: Never    Drug use: Never     Types: Marijuana    Sexual activity: Not on file     Prior to Admission medications    Medication Sig Start Date End Date Taking? Authorizing Provider   vitamin e (E GEMS) 100 unit capsule Take 100 Units by mouth daily. Provider, Historical   potassium chloride SR (KLOR-CON 10) 10 mEq tablet Take 10 mEq by mouth daily. Provider, Historical   acetaminophen (Tylenol Extra Strength) 500 mg tablet Take 500 mg by mouth every six (6) hours as needed for Pain. Provider, Historical   hydroCHLOROthiazide (HYDRODIURIL) 25 mg tablet Take 25 mg by mouth daily. Provider, Historical   gabapentin (NEURONTIN) 300 mg capsule Take 300 mg by mouth three (3) times daily. Provider, Historical   buPROPion XL (WELLBUTRIN XL) 150 mg tablet Take 150 mg by mouth daily. Provider, Historical   omeprazole (PRILOSEC) 20 mg capsule Take 20 mg by mouth daily. Provider, Historical   losartan (COZAAR) 50 mg tablet Take 50 mg by mouth daily. Provider, Historical   ergocalciferol, vitamin d2, 10 mcg (400 unit) tab Take 1 Tablet by mouth daily. Provider, Historical   omega 3-dha-epa-fish oil (Fish OiL) 100-160-1,000 mg cap Take 1 Capsule by mouth every other day. Provider, Historical   apixaban (ELIQUIS) 5 mg tablet Take 1 Tablet by mouth two (2) times a day. 8/21/21   Ketan Dos Santos MD   metoprolol tartrate (LOPRESSOR) 25 mg tablet Take 1 Tablet by mouth every twelve (12) hours. Patient taking differently: Take 25 mg by mouth daily. 8/21/21   Ketan Dos Santos MD       Allergies: Allergies   Allergen Reactions    Sulfa (Sulfonamide Antibiotics) Hives      No pertinent family history. Review of Systems:  A comprehensive review of systems was negative except for that written in the History of Present Illness. Objective:       Physical Exam:  HEENT: Normocephalic, atraumatic  Lungs:  Clear to auscultation  Heart:   Regular rate and rhythm  Abdomen: Soft  Extremities:  Pain with range of motion of the right hip  Neurological: Grossly neurovascularly intact    Assessment:      Arthritis of the right hip. Plan:       The patient has failed previous efforts of conservative management to include non-steroidal anti-inflammatory medications and cortisone injections. Due to the fact that conservative efforts failed, the patient became a candidate for surgical intervention. Proceed with scheduled right total hip arthroplasty, anterior approach. The various methods of treatment have been discussed with the patient and family. After consideration of risks, benefits, and other options for treatment, the patient has consented to surgical interventions. Questions were answered and preoperative teaching was done by Dr. Fatou Daugherty.      Signed By: Junito Saldivar PA-C     June 20, 2022

## 2022-06-20 NOTE — PERIOP NOTES
Family updated on patient current status at this time. This is a transition of care management visit for patient was admitted to [facility] from June 7th to June 11th due to fever  CT scan of the chest did not reveal any pneumonia  Remainder of workup was essentially negative  Patient was diagnosed with aspiration and advised to eat thickened liquids  And all we seated in a 90 degree angle to avoid aspiration  He is currently feeling better    Doing well on all current medications without side effects

## 2022-06-20 NOTE — DISCHARGE SUMMARY
Total Hip Discharge Summary    Patient: Jaci Sargent               Sex: female         MRN: [id number]       YOB: 1960      Age:  58 y.o.        LOS:  LOS: 0 days                DOA: 6/20/2022    Discharge Date: 6/20/2022    Admission Diagnoses: RIGHT HIP OSTEOARTHRITIS    Discharge Diagnoses:    Problem List as of 6/20/2022 Date Reviewed: 6/20/2022          Codes Class Noted - Resolved    Chest pressure ICD-10-CM: R07.89  ICD-9-CM: 786.59  5/9/2022 - Present        PAF (paroxysmal atrial fibrillation) ([facility] ICD-10-CM: I48.0  ICD-9-CM: 427.31  5/9/2022 - Present        Systolic CHF, chronic ([facility] ICD-10-CM: I50.22  ICD-9-CM: 428.22, 428.0  5/9/2022 - Present        Diabetes type 2, 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  A dilated fundus exam showed no diabetic retinopathy without macular edema. Maintain optimum BS, BP, and BMI. Monitor annually. Chronic bilateral low back pain with right-sided sciatica ICD-10-CM: M54.41, G89.29  ICD-9-CM: 724.2, 724.3, 338.29  1/18/2019 - Present    Overview Signed 8/16/2021  3:55 PM by Ellen Mendieta MD     Last Assessment & Plan:   Formatting of this note might be different from the original.  New- will get baseline xray. Right hip pain ICD-10-CM: M25.551  ICD-9-CM: 719.45  1/18/2019 - Present    Overview Signed 8/16/2021  3:55 PM by Ellen Mendieta MD     Last Assessment & Plan:   Formatting of this note might be different from the original.  Unchanged- referral sent to ortho as she would like to get another steroid injection which helped her about a year ago. Encouraged weight loss and regular exercise as well.              Peripheral polyneuropathy ICD-10-CM: G62.9  ICD-9-CM: 356.9  12/14/2017 - Present    Overview Signed 8/16/2021  3:55 PM by Saman Robles MD     Last Assessment & Plan:   Formatting of this note might be different from the original.  Unchanged- pt states her symptoms have worsened but very well could be her fibromyalgia as her depression and anxiety have worsened. She has f/u with Dr. Salo Magana tomorrow. She is taking 900mg of gabapentin at bedtime. She says the pain is throughout the day as well. Recommended taking tid which I have prescribed - specifically 300mg, 300mg mid-afternoon and 600mg qhs. May also benefit from TCA to help with depression and neuropathy/fibromyalgia pain. Benign essential hypertension ICD-10-CM: I10  ICD-9-CM: 401.1  9/5/2013 - Present    Overview Signed 8/16/2021  3:55 PM by Saman Robles MD     Last Assessment & Plan:   Formatting of this note might be different from the original.  Unchanged- BP continues to be uncontrolled. I am going to add losartan 50mg to her HCTZ 25mg daily. I have given her a hard copy of BP monitor order to see if her insurance will cover. Otherwise, pt to come to the office for 3 BP checks before our next f/u visit in 6 weeks. Pt is to wait a week after she has started the zoloft and knows she is tolerating before starting the losartan. Cobalamin deficiency ICD-10-CM: E53.8  ICD-9-CM: 266.2  2/7/2013 - Present    Overview Signed 8/16/2021  3:55 PM by Saman Robles MD     Last Assessment & Plan:   Formatting of this note might be different from the original.  Unchanged- at goal.  Will continue current management.              Calculus of kidney ICD-10-CM: N20.0  ICD-9-CM: 592.0  9/17/2012 - Present        Gastroesophageal reflux disease ICD-10-CM: K21.9  ICD-9-CM: 530.81  9/29/2003 - Present    Overview Signed 8/16/2021  3:55 PM by Saman Robles MD     Last Assessment & Plan:   Formatting of this note might be different from the original.  Unchanged- suggested trying to stop the PPI and see if she is able to not take it. Reviewed long term side effects of PPI therapy. This is a 58 y.o. female with a  history of ongoing hip pain secondary to degenerative joint disease. The patient has failed to respond to conservative care. The option of a total hip arthroplasty, anterior approach was discussed with the patient. Risks and benefits of the procedure were explained to the patient as well as other treatment options and possible surgical outcomes. The patient acknowledged and consent was obtained. The patient was therefore scheduled to undergo a right total hip arthroplasty with Dr. Mandeep Ahumada. The patient was taken to the operating room for the above-stated procedure. IV antibiotics were given prior to the incision. SCDs were used for DVT prophylaxis. The patient had an estimated intraoperative blood loss of 200  mL. The patient tolerated the procedure well without any complications, and was taken to the recovery room in stable condition. The patient was then transferred to the postoperative orthopedic floor for convalescence, PT, pain management, as well as discharge planning. Physical therapy and occupational therapy initiated their evaluation and treatment and continued to follow the patient until the patient was discharged. Post operative pain was adequately managed with use of oral narcotics prior to discharge. DVT prophylaxis was initiated on the day of surgery including; Eliquis, compression stockings and bilateral foot pumps. At the time of discharge, the patient was able to ambulate safely, go up and down stairs and had an understanding of the explicit discharge precautions and instructions following surgery. Home Health will come out to assist the patient with this. The patient was discharged to follow up with Dr. Mandeep Ahumada in approximately 10 to 14 days. Discharge Condition: Good  DISPOSITION: To home. On the day of discharge the patient was afebrile. Vital signs were stable. The patient was in no acute distress. her Right hip incision was clean, dry, and intact. Extremity was warm and well-perfused, distally neurovascularly intact. DISCHARGE INSTRUCTIONS:  The patient will be discharged home on her previously prescribed Eliquis for DVT prophylaxis. Continue physical therapy for gait training and strengthening. Continue therapeutic exercises. Follow up in 10 to 14 days with Dr. Ramy Childress. DISCHARGE MEDICATIONS:   Current Discharge Medication List      START taking these medications    Details   cephALEXin (KEFLEX) 500 mg capsule Take 1 Capsule by mouth four (4) times daily for 2 days. Qty: 8 Capsule, Refills: 0      oxyCODONE-acetaminophen (PERCOCET) 5-325 mg per tablet Take 1-2 Tablets by mouth every four (4) hours as needed for Pain for up to 5 days. Max Daily Amount: 12 Tablets. Qty: 40 Tablet, Refills: 0    Associated Diagnoses: S/P hip replacement, right         CONTINUE these medications which have NOT CHANGED    Details   vitamin e (E GEMS) 100 unit capsule Take 100 Units by mouth daily. potassium chloride SR (KLOR-CON 10) 10 mEq tablet Take 10 mEq by mouth daily. hydroCHLOROthiazide (HYDRODIURIL) 25 mg tablet Take 25 mg by mouth daily. gabapentin (NEURONTIN) 300 mg capsule Take 300 mg by mouth three (3) times daily. buPROPion XL (WELLBUTRIN XL) 150 mg tablet Take 150 mg by mouth daily. omeprazole (PRILOSEC) 20 mg capsule Take 20 mg by mouth daily. losartan (COZAAR) 50 mg tablet Take 50 mg by mouth daily. ergocalciferol, vitamin d2, 10 mcg (400 unit) tab Take 1 Tablet by mouth daily. metoprolol tartrate (LOPRESSOR) 25 mg tablet Take 1 Tablet by mouth every twelve (12) hours. Qty: 60 Tablet, Refills: 2      omega 3-dha-epa-fish oil (Fish OiL) 100-160-1,000 mg cap Take 1 Capsule by mouth every other day. apixaban (ELIQUIS) 5 mg tablet Take 1 Tablet by mouth two (2) times a day.   Qty: 60 Tablet, Refills: 2         STOP taking these medications       acetaminophen (Tylenol Extra Strength) 500 mg tablet Comments:   Reason for Stopping:

## 2022-06-20 NOTE — PROGRESS NOTES
Problem: Mobility Impaired (Adult and Pediatric)  Goal: *Acute Goals and Plan of Care (Insert Text)  Description: PT goals to be met in 1 day:  Pt will be able to perform supine<>sit SBA for transfers at home. Pt will be able to perform sit<>stand SBA for increased ability to transfer at home safely. Pt will be able to participate in gt training >100' w/ RW, WBAT, GB and CGA/SBA for improved ability in home upon d/c. Pt will be able to perform stair training step to pattern, B/U rail and CGA to obtain safe entry into home upon d/c. Pt will be educated regarding HEP per MD protocol for optimal AROM/strength outcomes. Note: [x]  Patient has met MD mobilization critieria for d/c home   [x]  Recommend HH with 24 hour adult care   []  Benefit from additional acute PT session to address:      PHYSICAL THERAPY EVALUATION    Patient: Yvon Mendieta (89 y.o. female)  Date: 6/20/2022  Primary Diagnosis: RIGHT HIP OSTEOARTHRITIS  Procedure(s) (LRB):  RIGHT TOTAL HIP REPLACEMENT ANTERIOR APPROACH WITH C-ARM (Right) Day of Surgery   Precautions:   Fall,WBAT    PLOF: Independent     ASSESSMENT :  Based on the objective data described below, the patient presents with decreased mobility in regards to bed mobility, transfers, gt quality and tolerance, balance, stair negotiation and safety due to R GENI surgery. Decreased AROM of R hip, dec strength of R hip, pain in R hip, dec sensation of R hip also impacting pt functional mobility. Pt rating pain on numerical pain scale pre/post and during session 6/10. Pt sitting in recliner upon arrival.  Pt and boyfriend ed regarding mobility safety, WB, HEP, ice application/use, elevation, environmental safety and home safe techniques. Pt able to perform sit<>stand w/ CGA/SBA. Safety vc required throughout session to reinforce safety. Pt able to participate in gt training using RW, GB, WBAT and CGA w/ antalgic gt pattern.   Pt was able to participate in stair training using step to pattern, B rails and CGA. Answered questions by pt and boyfriend in regards to PT and mobility. Pt left sitting in recliner w/ all needs within reach and ice pack to R hip. Nurse aware of session and outcomes. Recommend HHPT with responsible adult care at least 24 hours upon hospital d/c. Patient will benefit from skilled intervention to address the above impairments. Patient's rehabilitation potential is considered to be Good  Factors which may influence rehabilitation potential include:   []         None noted  []         Mental ability/status  []         Medical condition  []         Home/family situation and support systems  []         Safety awareness  [x]         Pain tolerance/management  []         Other:      PLAN :  Recommendations and Planned Interventions:   [x]           Bed Mobility Training             []    Neuromuscular Re-Education  [x]           Transfer Training                   []    Orthotic/Prosthetic Training  [x]           Gait Training                          [x]    Modalities  [x]           Therapeutic Exercises           [x]    Edema Management/Control  [x]           Therapeutic Activities            [x]    Family Training/Education  [x]           Patient Education  []           Other (comment):    Frequency/Duration: Patient will be followed by physical therapy 1-2 times per day/4-7 days per week to address goals. Discharge Recommendations: Home Health  Further Equipment Recommendations for Discharge: N/A     SUBJECTIVE:   Patient stated I am hurting.     OBJECTIVE DATA SUMMARY:     Past Medical History:   Diagnosis Date    A-fib ([facility]     Acid reflux     Arthritis     Autoimmune disease ([facility]     fibromalagia    Back pain     Chronic systolic CHF (congestive heart failure) (HCC)     Diabetes (HCC)     prediabetic    Fibromyalgia     GERD (gastroesophageal reflux disease)     Hypertension     Neuropathy     Psychiatric disorder     Sepsis ([facility]      Past Surgical History: Procedure Laterality Date    HX HERNIA REPAIR      HX HERNIA REPAIR      HX HERNIA REPAIR       Barriers to Learning/Limitations: yes;  anesthesia  Compensate with: Visual Cues, Verbal Cues, and Tactile Cues  Home Situation:  Home Situation  Home Environment: Private residence  # Steps to Enter: 5  Rails to Enter: Yes  Hand Rails : Bilateral  One/Two Story Residence: One story  Living Alone: No  Support Systems: Spouse/Significant Other  Patient Expects to be Discharged to[de-identified] Home with home health  Current DME Used/Available at Home: [employer], straight,Walker, rolling  Tub or Shower Type: Shower  Critical Behavior:  Neurologic State: Alert  Orientation Level: Oriented to person;Oriented to place;Oriented to situation  Cognition: Follows commands  Safety/Judgement: Awareness of environment  Psychosocial  Patient Behaviors: Calm; Cooperative  Visitor Behaviors: Supportive;Calm  Skin Condition/Temp: Dry;Warm  Skin Integrity: Incision (comment) (R hip)  Skin Integumentary  Skin Color: Appropriate for ethnicity  Skin Condition/Temp: Dry;Warm  Skin Integrity: Incision (comment) (R hip)  Turgor: Non-tenting  Hair Growth: Present  Varicosities: Absent  Nails: Within Defined Limits  Strength:    Strength: Generally decreased, functional  Tone & Sensation:   Tone: Normal  Sensation: Impaired (R hip)  Range Of Motion:  AROM: Generally decreased, functional  Posture:  Functional Mobility:  Bed Mobility:  Scooting: Stand-by assistance  Transfers:  Sit to Stand: Contact guard assistance;Stand-by assistance (vc)  Stand to Sit: Contact guard assistance;Stand-by assistance (vc)  Balance:   Sitting: Intact  Standing: Intact; With support  Ambulation/Gait Training:  Distance (ft): 180 Feet (ft)  Assistive Device: Walker, rolling;Gait belt  Ambulation - Level of Assistance: Contact guard assistance (vc)  Gait Abnormalities: Antalgic;Decreased step clearance  Right Side Weight Bearing: As tolerated  Base of Support: Shift to left  Stance: Right decreased  Speed/Kenzie: Slow  Step Length: Left shortened;Right shortened  Swing Pattern: Left asymmetrical;Right asymmetrical  Interventions: Safety awareness training; Tactile cues; Verbal cues; Visual/Demos  Stairs:  Number of Stairs Trained: 5  Stairs - Level of Assistance: Contact guard assistance (vc)  Rail Use: Both     Therapeutic Exercises:   Encouraged HEP  Pain:  Pain level pre-treatment: 6/10   Pain level post-treatment: 6/10   Pain Intervention(s) : Medication (see MAR); Rest, Ice, Repositioning  Response to intervention: Nurse notified, See doc flow    Activity Tolerance:   Fair   Please refer to the flowsheet for vital signs taken during this treatment. After treatment:   [x]         Patient left in no apparent distress sitting up in chair  []         Patient left in no apparent distress in bed  [x]         Call bell left within reach  [x]         Nursing notified  [x]         Caregiver present  []         Bed alarm activated  []         SCDs applied    COMMUNICATION/EDUCATION:   [x]         Role of Physical Therapy in the acute care setting. [x]         Fall prevention education was provided and the patient/caregiver indicated understanding. [x]         Patient/family have participated as able in goal setting and plan of care. [x]         Patient/family agree to work toward stated goals and plan of care. []         Patient understands intent and goals of therapy, but is neutral about his/her participation. []         Patient is unable to participate in goal setting/plan of care: ongoing with therapy staff.  []         Other:     Thank you for this referral.  Alleen Soulier, PT   Time Calculation: 23 mins      Eval Complexity: History: HIGH Complexity :3+ comorbidities / personal factors will impact the outcome/ POC Exam:MEDIUM Complexity : 3 Standardized tests and measures addressing body structure, function, activity limitation and / or participation in recreation  Presentation: LOW Complexity : Stable, uncomplicated  Clinical Decision Making:Low Complexity    Overall Complexity:LOW

## 2022-06-20 NOTE — INTERVAL H&P NOTE
Update History & Physical    The Patient's History and Physical of June 20, 2022 was reviewed with the patient and I examined the patient. There was no change. The surgical site was confirmed by the patient and me. Plan:  The risk, benefits, expected outcome, and alternative to the recommended procedure have been discussed with the patient. Patient understands and wants to proceed with the procedure.     Electronically signed by Kristina Kearns MD on 6/20/2022 at 9:59 AM

## 2022-09-02 ENCOUNTER — APPOINTMENT (OUTPATIENT)
Dept: GENERAL RADIOLOGY | Age: 62
End: 2022-09-02
Attending: STUDENT IN AN ORGANIZED HEALTH CARE EDUCATION/TRAINING PROGRAM
Payer: MEDICARE

## 2022-09-02 ENCOUNTER — HOSPITAL ENCOUNTER (EMERGENCY)
Age: 62
Discharge: HOME | End: 2022-09-02
Attending: STUDENT IN AN ORGANIZED HEALTH CARE EDUCATION/TRAINING PROGRAM
Payer: MEDICARE

## 2022-09-02 VITALS
HEART RATE: 53 BPM | OXYGEN SATURATION: 95 % | RESPIRATION RATE: 13 BRPM | TEMPERATURE: 98.8 F | WEIGHT: 193 LBS | BODY MASS INDEX: 33.13 KG/M2 | SYSTOLIC BLOOD PRESSURE: 106 MMHG | DIASTOLIC BLOOD PRESSURE: 54 MMHG

## 2022-09-02 DIAGNOSIS — M54.50 ACUTE LEFT-SIDED LOW BACK PAIN WITHOUT SCIATICA: Primary | ICD-10-CM

## 2022-09-02 LAB
ALBUMIN SERPL-MCNC: 3.5 G/DL (ref 3.4–5)
ALBUMIN/GLOB SERPL: 1 {RATIO} (ref 0.8–1.7)
ALP SERPL-CCNC: 85 U/L (ref 45–117)
ALT SERPL-CCNC: 18 U/L (ref 13–56)
ANION GAP SERPL CALC-SCNC: 4 MMOL/L (ref 3–18)
APPEARANCE UR: ABNORMAL
AST SERPL-CCNC: 18 U/L (ref 10–38)
BACTERIA URNS QL MICRO: NEGATIVE /HPF
BASOPHILS # BLD: 0 K/UL (ref 0–0.1)
BASOPHILS NFR BLD: 1 % (ref 0–2)
BILIRUB SERPL-MCNC: 0.2 MG/DL (ref 0.2–1)
BILIRUB UR QL: NEGATIVE
BUN SERPL-MCNC: 13 MG/DL (ref 7–18)
BUN/CREAT SERPL: 18 (ref 12–20)
CALCIUM SERPL-MCNC: 9 MG/DL (ref 8.5–10.1)
CHLORIDE SERPL-SCNC: 101 MMOL/L (ref 100–111)
CO2 SERPL-SCNC: 30 MMOL/L (ref 21–32)
COLOR UR: YELLOW
CREAT SERPL-MCNC: 0.74 MG/DL (ref 0.6–1.3)
DIFFERENTIAL METHOD BLD: ABNORMAL
EOSINOPHIL # BLD: 0.2 K/UL (ref 0–0.4)
EOSINOPHIL NFR BLD: 4 % (ref 0–5)
EPITH CASTS URNS QL MICRO: NORMAL /LPF (ref 0–5)
ERYTHROCYTE [DISTWIDTH] IN BLOOD BY AUTOMATED COUNT: 14.1 % (ref 11.6–14.5)
GLOBULIN SER CALC-MCNC: 3.4 G/DL (ref 2–4)
GLUCOSE SERPL-MCNC: 93 MG/DL (ref 74–99)
GLUCOSE UR STRIP.AUTO-MCNC: NEGATIVE MG/DL
HCT VFR BLD AUTO: 37 % (ref 35–45)
HGB BLD-MCNC: 11.8 G/DL (ref 12–16)
HGB UR QL STRIP: ABNORMAL
IMM GRANULOCYTES # BLD AUTO: 0 K/UL (ref 0–0.04)
IMM GRANULOCYTES NFR BLD AUTO: 0 % (ref 0–0.5)
KETONES UR QL STRIP.AUTO: NEGATIVE MG/DL
LEUKOCYTE ESTERASE UR QL STRIP.AUTO: NEGATIVE
LIPASE SERPL-CCNC: 130 U/L (ref 73–393)
LYMPHOCYTES # BLD: 1.6 K/UL (ref 0.9–3.6)
LYMPHOCYTES NFR BLD: 38 % (ref 21–52)
MCH RBC QN AUTO: 31.2 PG (ref 24–34)
MCHC RBC AUTO-ENTMCNC: 31.9 G/DL (ref 31–37)
MCV RBC AUTO: 97.9 FL (ref 78–100)
MONOCYTES # BLD: 0.4 K/UL (ref 0.05–1.2)
MONOCYTES NFR BLD: 9 % (ref 3–10)
NEUTS SEG # BLD: 2 K/UL (ref 1.8–8)
NEUTS SEG NFR BLD: 48 % (ref 40–73)
NITRITE UR QL STRIP.AUTO: NEGATIVE
NRBC # BLD: 0 K/UL (ref 0–0.01)
NRBC BLD-RTO: 0 PER 100 WBC
PH UR STRIP: 5.5 [PH] (ref 5–8)
PLATELET # BLD AUTO: 133 K/UL (ref 135–420)
PMV BLD AUTO: 13 FL (ref 9.2–11.8)
POTASSIUM SERPL-SCNC: 3.7 MMOL/L (ref 3.5–5.5)
PROT SERPL-MCNC: 6.9 G/DL (ref 6.4–8.2)
PROT UR STRIP-MCNC: ABNORMAL MG/DL
RBC # BLD AUTO: 3.78 M/UL (ref 4.2–5.3)
RBC #/AREA URNS HPF: NORMAL /HPF (ref 0–5)
SODIUM SERPL-SCNC: 135 MMOL/L (ref 136–145)
SP GR UR REFRACTOMETRY: 1.01 (ref 1–1.03)
TROPONIN-HIGH SENSITIVITY: 7 NG/L (ref 0–54)
UROBILINOGEN UR QL STRIP.AUTO: 0.2 EU/DL (ref 0.2–1)
WBC # BLD AUTO: 4.1 K/UL (ref 4.6–13.2)
WBC URNS QL MICRO: NORMAL /HPF (ref 0–5)

## 2022-09-02 PROCEDURE — 81001 URINALYSIS AUTO W/SCOPE: CPT

## 2022-09-02 PROCEDURE — 71046 X-RAY EXAM CHEST 2 VIEWS: CPT

## 2022-09-02 PROCEDURE — 84484 ASSAY OF TROPONIN QUANT: CPT

## 2022-09-02 PROCEDURE — 80053 COMPREHEN METABOLIC PANEL: CPT

## 2022-09-02 PROCEDURE — 74011250637 HC RX REV CODE- 250/637: Performed by: STUDENT IN AN ORGANIZED HEALTH CARE EDUCATION/TRAINING PROGRAM

## 2022-09-02 PROCEDURE — 83690 ASSAY OF LIPASE: CPT

## 2022-09-02 PROCEDURE — 99285 EMERGENCY DEPT VISIT HI MDM: CPT

## 2022-09-02 PROCEDURE — 93005 ELECTROCARDIOGRAM TRACING: CPT

## 2022-09-02 PROCEDURE — 87086 URINE CULTURE/COLONY COUNT: CPT

## 2022-09-02 PROCEDURE — 85025 COMPLETE CBC W/AUTO DIFF WBC: CPT

## 2022-09-02 RX ORDER — ACETAMINOPHEN 500 MG
1000 TABLET ORAL ONCE
Status: COMPLETED | OUTPATIENT
Start: 2022-09-02 | End: 2022-09-02

## 2022-09-02 RX ADMIN — ACETAMINOPHEN 1000 MG: 500 TABLET ORAL at 09:52

## 2022-09-02 NOTE — DISCHARGE INSTRUCTIONS
You may take Tylenol 1 g every 6 hours as needed for pain. Take your muscle relaxant as needed for your back pain, it will make you drowsy. Return to the ER immediately for any new or worsening symptoms such as fever over 100.4 Fahrenheit, worsening abdominal pain, blood in the stool or any other concerning symptoms.

## 2022-09-03 LAB
BACTERIA SPEC CULT: NORMAL
CC UR VC: NORMAL
SERVICE CMNT-IMP: NORMAL

## 2022-09-04 NOTE — ED PROVIDER NOTES
EMERGENCY DEPARTMENT HISTORY AND PHYSICAL EXAM      Date: 9/2/2022  Patient Name: Bonilla Marcano      History of Presenting Illness     Chief Complaint   Patient presents with    Back Pain    Nausea       History Provided By: Patient    Location/Duration/Severity/Modifying factors   Bonilla Marcano is a 58 y.o. female with a PMH of hypertension, hyperlipidemia, diabetes not on any medications Presents to the ER for left-sided mid back pain. Patient states that for the last 3 days she has had body aches, nausea with a few episodes of watery diarrhea. Subjective fevers but no temperature over 100.4 Fahrenheit. Patient states that she was concerned for a kidney infection because she has had that in the past so came to the ER for evaluation. Patient denies taking any medications prior to arrival today. States that the pain is worse with certain movements and she feels better when she lays flat. No other medical complaints at this time. The history is provided by the patient. Back Pain   This is a new problem. The current episode started more than 2 days ago. The problem has not changed since onset. The problem occurs constantly. The pain is associated with no known injury. The pain is present in the left side and lower back. The pain radiates to the left thigh. The symptoms are aggravated by certain positions. Pertinent negatives include no chest pain, no numbness, no abdominal pain, no dysuria, no paresthesias and no weakness. Nausea   This is a new problem. The current episode started more than 2 days ago. The problem has not changed since onset. There has been no fever. Pertinent negatives include no chills, no abdominal pain and no diarrhea. There are no other complaints, changes, or physical findings at this time. PCP: Tamara Noel, DO    Current Outpatient Medications   Medication Sig Dispense Refill    vitamin e (E GEMS) 100 unit capsule Take 100 Units by mouth daily.       potassium chloride SR (KLOR-CON 10) 10 mEq tablet Take 10 mEq by mouth daily. hydroCHLOROthiazide (HYDRODIURIL) 25 mg tablet Take 25 mg by mouth daily. gabapentin (NEURONTIN) 300 mg capsule Take 300 mg by mouth three (3) times daily. buPROPion XL (WELLBUTRIN XL) 150 mg tablet Take 150 mg by mouth daily. omeprazole (PRILOSEC) 20 mg capsule Take 20 mg by mouth daily. losartan (COZAAR) 50 mg tablet Take 50 mg by mouth daily. ergocalciferol, vitamin d2, 10 mcg (400 unit) tab Take 1 Tablet by mouth daily. omega 3-dha-epa-fish oil (Fish OiL) 100-160-1,000 mg cap Take 1 Capsule by mouth every other day. apixaban (ELIQUIS) 5 mg tablet Take 1 Tablet by mouth two (2) times a day. 60 Tablet 2    metoprolol tartrate (LOPRESSOR) 25 mg tablet Take 1 Tablet by mouth every twelve (12) hours. (Patient taking differently: Take 25 mg by mouth daily.) 60 Tablet 2       Past History     Past Medical History:  Past Medical History:   Diagnosis Date    A-fib ([facility])     Acid reflux     Arthritis     Autoimmune disease (HCC)     fibromalagia    Back pain     Chronic systolic CHF (congestive heart failure) ([facility])     Diabetes ([facility])     prediabetic    Fibromyalgia     GERD (gastroesophageal reflux disease)     Hypertension     Neuropathy     Psychiatric disorder     Sepsis ([facility] [address]        Past Surgical History:  Past Surgical History:   Procedure Laterality Date    HX HERNIA REPAIR      HX HERNIA REPAIR      HX HERNIA REPAIR         Family History:  History reviewed. No pertinent family history. Social History:  Social History     Tobacco Use    Smoking status: Former     Types: Cigarettes     Quit date: 2019     Years since quitting: 3.6    Smokeless tobacco: Never   Vaping Use    Vaping Use: Never used   Substance Use Topics    Alcohol use: Never    Drug use: Never     Types: Marijuana       Allergies:   Allergies   Allergen Reactions    Sulfa (Sulfonamide Antibiotics) Hives         Review of Systems     Review of Systems Constitutional:  Negative for chills. Respiratory:  Negative for shortness of breath. Cardiovascular:  Negative for chest pain. Gastrointestinal:  Positive for nausea. Negative for abdominal pain, diarrhea and vomiting. Genitourinary:  Positive for flank pain. Negative for dysuria and frequency. Musculoskeletal:  Positive for back pain. Negative for gait problem. Skin:  Positive for color change. Neurological:  Negative for dizziness, weakness, numbness and paresthesias. Physical Exam     Physical Exam  Vitals reviewed. Constitutional:       General: She is not in acute distress. Appearance: Normal appearance. She is not toxic-appearing. HENT:      Head: Normocephalic and atraumatic. Right Ear: External ear normal.      Left Ear: External ear normal.      Nose: Nose normal.   Eyes:      Extraocular Movements: Extraocular movements intact. Conjunctiva/sclera: Conjunctivae normal.      Pupils: Pupils are equal, round, and reactive to light. Cardiovascular:      Rate and Rhythm: Normal rate and regular rhythm. Pulses: Normal pulses. Heart sounds: Normal heart sounds. Pulmonary:      Effort: Pulmonary effort is normal.      Breath sounds: Normal breath sounds. Abdominal:      General: Abdomen is flat. Palpations: Abdomen is soft. Tenderness: There is no abdominal tenderness. There is no right CVA tenderness, left CVA tenderness or guarding. Musculoskeletal:         General: No deformity or signs of injury. Normal range of motion. Cervical back: Neck supple. Comments: No point tenderness to the cervical, thoracic, lumbar sacral spine  Patient with left-sided hypertonicity to the lower thoracic as well as the upper lumbar paraspinal muscles. Pain is not reproducible on physical exam.  Range of motion is grossly intact with minimal discomfort. Skin:     General: Skin is warm and dry.       Capillary Refill: Capillary refill takes less than 2 seconds. Neurological:      General: No focal deficit present. Mental Status: She is alert and oriented to person, place, and time. Mental status is at baseline. Sensory: No sensory deficit. Motor: No weakness. Gait: Gait normal.   Psychiatric:         Mood and Affect: Mood normal.       Lab and Diagnostic Study Results     Labs -  No results found for this or any previous visit (from the past 24 hour(s)). Radiologic Studies -   XR CHEST PA LAT   Final Result      No acute findings in the chest.            Medical Decision Making and ED Course   - I am the first and primary provider for this patient AND AM THE PRIMARY PROVIDER OF RECORD. - I reviewed the vital signs, available nursing notes, past medical history, past surgical history, family history and social history. - Initial assessment performed. The patients presenting problems have been discussed, and the staff are in agreement with the care plan formulated and outlined with them. I have encouraged them to ask questions as they arise throughout their visit. Vital Signs-Reviewed the patient's vital signs. No data found. Records Reviewed: Nursing Notes        Provider Notes (Medical Decision Making):     MDM  Number of Diagnoses or Management Options  Acute left-sided low back pain without sciatica  Diagnosis management comments: Well-appearing female in no acute distress presents to the ER with left-sided back pain likely secondary to muscular skeletal pain. Patient also complaining of abdominal discomfort in the epigastric region for which labs were drawn, and were unremarkable. Discussed these results with patient at bedside. Discussed close observation at home including supportive over-the-counter medication patient's for her symptoms. Discussed close return to ED precautions for any new or worsening symptoms.        Amount and/or Complexity of Data Reviewed  Clinical lab tests: ordered and reviewed ED Course:       ED Course as of 09/04/22 1239   Fri Sep 02, 2022   1040 EKG with sinus bradycardia at 55 bpm, , no ST elevations or depressions, no STEMI. Rate of cardia unchanged from prior EKG dated May 9, 2022. [NN]      ED Course User Index  [NN] James Lesch, DO     ------------------------------------------------------------------------------------------------------------          Disposition         Discharged      Diagnosis     Clinical Impression:   1. Acute left-sided low back pain without sciatica        Attestations:    Azar Lorenzo, DO    Please note that this dictation was completed with [employer], the computer voice recognition software. Quite often unanticipated grammatical, syntax, homophones, and other interpretive errors are inadvertently transcribed by the computer software. Please disregard these errors. Please excuse any errors that have escaped final proofreading. Thank you.

## 2022-09-05 LAB
ATRIAL RATE: 55 BPM
CALCULATED P AXIS, ECG09: 105 DEGREES
CALCULATED R AXIS, ECG10: 49 DEGREES
CALCULATED T AXIS, ECG11: 58 DEGREES
DIAGNOSIS, 93000: NORMAL
P-R INTERVAL, ECG05: 176 MS
Q-T INTERVAL, ECG07: 496 MS
QRS DURATION, ECG06: 92 MS
QTC CALCULATION (BEZET), ECG08: 474 MS
VENTRICULAR RATE, ECG03: 55 BPM

## 2023-10-15 ENCOUNTER — APPOINTMENT (OUTPATIENT)
Facility: HOSPITAL | Age: 63
End: 2023-10-15
Payer: MEDICARE

## 2023-10-15 ENCOUNTER — HOSPITAL ENCOUNTER (EMERGENCY)
Facility: HOSPITAL | Age: 63
Discharge: HOME | End: 2023-10-15
Attending: EMERGENCY MEDICINE
Payer: MEDICARE

## 2023-10-15 VITALS
HEIGHT: 64 IN | BODY MASS INDEX: 32.44 KG/M2 | DIASTOLIC BLOOD PRESSURE: 64 MMHG | SYSTOLIC BLOOD PRESSURE: 124 MMHG | HEART RATE: 65 BPM | OXYGEN SATURATION: 97 % | RESPIRATION RATE: 16 BRPM | TEMPERATURE: 98.2 F | WEIGHT: 190 LBS

## 2023-10-15 DIAGNOSIS — M79.641 RIGHT HAND PAIN: Primary | ICD-10-CM

## 2023-10-15 PROCEDURE — 73130 X-RAY EXAM OF HAND: CPT

## 2023-10-15 PROCEDURE — 99283 EMERGENCY DEPT VISIT LOW MDM: CPT

## 2023-10-15 PROCEDURE — 6370000000 HC RX 637 (ALT 250 FOR IP): Performed by: EMERGENCY MEDICINE

## 2023-10-15 RX ORDER — MELOXICAM 15 MG/1
15 TABLET ORAL DAILY PRN
Qty: 30 TABLET | Refills: 0 | Status: SHIPPED | OUTPATIENT
Start: 2023-10-15

## 2023-10-15 RX ORDER — MELOXICAM 7.5 MG/1
15 TABLET ORAL
Status: COMPLETED | OUTPATIENT
Start: 2023-10-15 | End: 2023-10-15

## 2023-10-15 RX ADMIN — MELOXICAM 15 MG: 7.5 TABLET ORAL at 14:17

## 2023-10-15 ASSESSMENT — PAIN - FUNCTIONAL ASSESSMENT: PAIN_FUNCTIONAL_ASSESSMENT: 0-10

## 2023-10-15 ASSESSMENT — PAIN SCALES - GENERAL: PAINLEVEL_OUTOF10: 8

## 2023-10-15 NOTE — ED PROVIDER NOTES
MEDICATIONS:  Current Discharge Medication List          PATIENT REFERRED TO:  Follow Up with:  Kiesha Otto DO  [address]   [phone]    Schedule an appointment as soon as possible for a visit       [facility] EMERGENCY DEPT  [address] [place] [address]  [phone]  Go to   As needed, If symptoms worsen      I Masha Means am the primary clinician of record. Dragon Disclaimer     Please note that this dictation was completed with [employer], the computer voice recognition software. Quite often unanticipated grammatical, syntax, homophones, and other interpretive errors are inadvertently transcribed by the computer software. Please disregard these errors. Please excuse any errors that have escaped final proofreading.     Masha Means MD  (Electronically signed)            Tika Garcia MD  10/15/23 999

## 2023-10-15 NOTE — ED TRIAGE NOTES
Pt ambulatory c/o right hand pain and swelling x 2 weeks. Does not remember how pain started. Pt denies trauma.

## 2023-11-17 ENCOUNTER — HOSPITAL ENCOUNTER (EMERGENCY)
Facility: HOSPITAL | Age: 63
Discharge: HOME | End: 2023-11-17
Payer: MEDICARE

## 2023-11-17 ENCOUNTER — APPOINTMENT (OUTPATIENT)
Facility: HOSPITAL | Age: 63
End: 2023-11-17
Payer: MEDICARE

## 2023-11-17 VITALS
WEIGHT: 95 LBS | HEIGHT: 64 IN | HEART RATE: 80 BPM | BODY MASS INDEX: 16.22 KG/M2 | RESPIRATION RATE: 18 BRPM | DIASTOLIC BLOOD PRESSURE: 59 MMHG | SYSTOLIC BLOOD PRESSURE: 136 MMHG | TEMPERATURE: 97.6 F | OXYGEN SATURATION: 95 %

## 2023-11-17 DIAGNOSIS — Z79.01 ANTICOAGULATED: ICD-10-CM

## 2023-11-17 DIAGNOSIS — M25.531 RIGHT WRIST PAIN: Primary | ICD-10-CM

## 2023-11-17 DIAGNOSIS — I10 ESSENTIAL HYPERTENSION: ICD-10-CM
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
## 2023-11-17 PROCEDURE — 29125 APPL SHORT ARM SPLINT STATIC: CPT

## 2023-11-17 PROCEDURE — 73110 X-RAY EXAM OF WRIST: CPT

## 2023-11-17 PROCEDURE — 99283 EMERGENCY DEPT VISIT LOW MDM: CPT

## 2023-11-17 ASSESSMENT — PAIN - FUNCTIONAL ASSESSMENT: PAIN_FUNCTIONAL_ASSESSMENT: 0-10

## 2023-11-17 ASSESSMENT — PAIN SCALES - GENERAL: PAINLEVEL_OUTOF10: 8

## 2023-11-17 NOTE — ED PROVIDER NOTES
[facility] EMERGENCY DEPT  EMERGENCY DEPARTMENT ENCOUNTER       Pt Name: Sigrid Dye  MRN: [id number]  [address] [facility] 1960  Date of evaluation: 11/17/2023  PCP: Adele Garcia DO  Note Started: 6:07 PM 11/17/23     CHIEF COMPLAINT       Chief Complaint   Patient presents with    Fall    Arm Injury        HISTORY OF PRESENT ILLNESS: 1 or more elements      History From: Patient  HPI Limitations: None  Chronic Conditions: Hypertension, peripheral neuropathy, GERD, type II DM, arthritis, fibromyalgia, A-fib (on blood thinner)  Social Determinants affecting Dx or Tx: None    Sigrid Dye is a 61 y.o. female with history of hypertension, peripheral neuropathy, GERD, type II DM, arthritis, fibromyalgia, A-fib (on blood thinner) who presents to ED c/o right wrist pain s/p nonsyncopal slip and fall. Patient reports she was washing dishes in her kitchen this afternoon when she tripped on a throw rug and fell onto her right outstretched hand. Patient denies dizziness or lightheadedness prior to fall, head injury, neck injury, LOC, dizziness, vision changes, focal weakness or paresthesias, chest pain, shortness of breath, abdominal pain, additional extremity pain. Patient has not taken any medications prior to arrival, has been using ice. Patient is right-hand dominant. Nursing Notes were all reviewed and agreed with or any disagreements were addressed in the HPI.     PAST HISTORY     Past Medical History:  Past Medical History:   Diagnosis Date    A-fib ([address])     Acid reflux     Arthritis     Autoimmune disease (HCC)     fibromalagia    Back pain     Chronic systolic CHF (congestive heart failure) (HCC)     Diabetes (HCC)     prediabetic    Fibromyalgia     GERD (gastroesophageal reflux disease)     Hypertension     Neuropathy     Psychiatric disorder     Sepsis ([address])        Past Surgical History:  Past Surgical History:   Procedure Laterality Date    HERNIA REPAIR      HERNIA REPAIR      HERNIA REPAIR         Family

## 2024-04-29 ENCOUNTER — HOSPITAL ENCOUNTER (OUTPATIENT)
Facility: HOSPITAL | Age: 64
Discharge: HOME | End: 2024-05-02
Payer: MEDICARE

## 2024-04-29 VITALS — HEIGHT: 64 IN | WEIGHT: 190 LBS | BODY MASS INDEX: 32.44 KG/M2

## 2024-04-29 DIAGNOSIS — Z01.818 PRE-OP TESTING: Primary | ICD-10-CM

## 2024-04-29 LAB
ALBUMIN SERPL-MCNC: 3.7 G/DL (ref 3.4–5)
ALBUMIN/GLOB SERPL: 1.1 (ref 0.8–1.7)
ALP SERPL-CCNC: 107 U/L (ref 45–117)
ALT SERPL-CCNC: 28 U/L (ref 13–56)
ANION GAP SERPL CALC-SCNC: 4 MMOL/L (ref 3–18)
AST SERPL-CCNC: 25 U/L (ref 10–38)
BILIRUB SERPL-MCNC: 0.3 MG/DL (ref 0.2–1)
BUN SERPL-MCNC: 24 MG/DL (ref 7–18)
BUN/CREAT SERPL: 27 (ref 12–20)
CALCIUM SERPL-MCNC: 9.4 MG/DL (ref 8.5–10.1)
CHLORIDE SERPL-SCNC: 105 MMOL/L (ref 100–111)
CO2 SERPL-SCNC: 28 MMOL/L (ref 21–32)
CREAT SERPL-MCNC: 0.88 MG/DL (ref 0.6–1.3)
ERYTHROCYTE [DISTWIDTH] IN BLOOD BY AUTOMATED COUNT: 13.8 % (ref 11.6–14.5)
EST. AVERAGE GLUCOSE BLD GHB EST-MCNC: 111 MG/DL
GLOBULIN SER CALC-MCNC: 3.5 G/DL (ref 2–4)
GLUCOSE SERPL-MCNC: 132 MG/DL (ref 74–99)
HBA1C MFR BLD: 5.5 % (ref 4.2–5.6)
HCT VFR BLD AUTO: 36.8 % (ref 35–45)
HGB BLD-MCNC: 12.4 G/DL (ref 12–16)
MCH RBC QN AUTO: 32.7 PG (ref 24–34)
MCHC RBC AUTO-ENTMCNC: 33.7 G/DL (ref 31–37)
MCV RBC AUTO: 97.1 FL (ref 78–100)
NRBC # BLD: 0 K/UL (ref 0–0.01)
NRBC BLD-RTO: 0 PER 100 WBC
PLATELET # BLD AUTO: 191 K/UL (ref 135–420)
PMV BLD AUTO: 13.1 FL (ref 9.2–11.8)
POTASSIUM SERPL-SCNC: 4.1 MMOL/L (ref 3.5–5.5)
PROT SERPL-MCNC: 7.2 G/DL (ref 6.4–8.2)
RBC # BLD AUTO: 3.79 M/UL (ref 4.2–5.3)
SODIUM SERPL-SCNC: 137 MMOL/L (ref 136–145)
WBC # BLD AUTO: 7.9 K/UL (ref 4.6–13.2)

## 2024-04-29 PROCEDURE — 80053 COMPREHEN METABOLIC PANEL: CPT

## 2024-04-29 PROCEDURE — 85027 COMPLETE CBC AUTOMATED: CPT

## 2024-04-29 PROCEDURE — 83036 HEMOGLOBIN GLYCOSYLATED A1C: CPT

## 2024-04-29 PROCEDURE — 93005 ELECTROCARDIOGRAM TRACING: CPT | Performed by: ORTHOPAEDIC SURGERY

## 2024-04-30 LAB
EKG ATRIAL RATE: 58 BPM
EKG DIAGNOSIS: NORMAL
EKG P AXIS: 51 DEGREES
EKG P-R INTERVAL: 168 MS
EKG Q-T INTERVAL: 456 MS
EKG QRS DURATION: 84 MS
EKG QTC CALCULATION (BAZETT): 447 MS
EKG R AXIS: 57 DEGREES
EKG T AXIS: 59 DEGREES
EKG VENTRICULAR RATE: 58 BPM

## 2024-05-01 LAB
BACTERIA SPEC CULT: NORMAL
BACTERIA SPEC CULT: NORMAL
SERVICE CMNT-IMP: NORMAL

## 2025-04-17 ENCOUNTER — HOSPITAL ENCOUNTER (EMERGENCY)
Facility: HOSPITAL | Age: 65
Discharge: HOME | End: 2025-04-17
Payer: MEDICARE

## 2025-04-17 ENCOUNTER — APPOINTMENT (OUTPATIENT)
Facility: HOSPITAL | Age: 65
End: 2025-04-17
Payer: MEDICARE

## 2025-04-17 VITALS
DIASTOLIC BLOOD PRESSURE: 68 MMHG | TEMPERATURE: 97.9 F | WEIGHT: 195 LBS | OXYGEN SATURATION: 98 % | SYSTOLIC BLOOD PRESSURE: 114 MMHG | HEIGHT: 64 IN | RESPIRATION RATE: 18 BRPM | BODY MASS INDEX: 33.29 KG/M2 | HEART RATE: 85 BPM

## 2025-04-17 DIAGNOSIS — E87.6 HYPOKALEMIA: ICD-10-CM

## 2025-04-17 DIAGNOSIS — R20.2 PARESTHESIA: Primary | ICD-10-CM

## 2025-04-17 DIAGNOSIS — Z86.69 HISTORY OF NEUROPATHY: ICD-10-CM

## 2025-04-17 DIAGNOSIS — R06.09 DOE (DYSPNEA ON EXERTION): ICD-10-CM

## 2025-04-17 LAB
ALBUMIN SERPL-MCNC: 3.6 G/DL (ref 3.4–5)
ALBUMIN/GLOB SERPL: 1.1 (ref 0.8–1.7)
ALP SERPL-CCNC: 111 U/L (ref 45–117)
ALT SERPL-CCNC: 23 U/L (ref 13–56)
ANION GAP SERPL CALC-SCNC: 5 MMOL/L (ref 3–18)
AST SERPL-CCNC: 17 U/L (ref 10–38)
BASOPHILS # BLD: 0.05 K/UL (ref 0–0.1)
BASOPHILS NFR BLD: 0.5 % (ref 0–2)
BILIRUB SERPL-MCNC: 0.2 MG/DL (ref 0.2–1)
BUN SERPL-MCNC: 17 MG/DL (ref 7–18)
BUN/CREAT SERPL: 24 (ref 12–20)
CALCIUM SERPL-MCNC: 8.8 MG/DL (ref 8.5–10.1)
CHLORIDE SERPL-SCNC: 104 MMOL/L (ref 100–111)
CO2 SERPL-SCNC: 31 MMOL/L (ref 21–32)
CREAT SERPL-MCNC: 0.72 MG/DL (ref 0.6–1.3)
DIFFERENTIAL METHOD BLD: ABNORMAL
EOSINOPHIL # BLD: 0.3 K/UL (ref 0–0.4)
EOSINOPHIL NFR BLD: 3.2 % (ref 0–5)
ERYTHROCYTE [DISTWIDTH] IN BLOOD BY AUTOMATED COUNT: 14 % (ref 11.6–14.5)
GLOBULIN SER CALC-MCNC: 3.2 G/DL (ref 2–4)
GLUCOSE SERPL-MCNC: 131 MG/DL (ref 74–99)
HCT VFR BLD AUTO: 37 % (ref 35–45)
HGB BLD-MCNC: 12.4 G/DL (ref 12–16)
IMM GRANULOCYTES # BLD AUTO: 0.03 K/UL (ref 0–0.04)
IMM GRANULOCYTES NFR BLD AUTO: 0.3 % (ref 0–0.5)
LYMPHOCYTES # BLD: 3.71 K/UL (ref 0.9–3.3)
LYMPHOCYTES NFR BLD: 40 % (ref 21–52)
MCH RBC QN AUTO: 33.5 PG (ref 24–34)
MCHC RBC AUTO-ENTMCNC: 33.5 G/DL (ref 31–37)
MCV RBC AUTO: 100 FL (ref 78–100)
MONOCYTES # BLD: 0.46 K/UL (ref 0.05–1.2)
MONOCYTES NFR BLD: 5 % (ref 3–10)
NEUTS SEG # BLD: 4.73 K/UL (ref 1.8–8)
NEUTS SEG NFR BLD: 51 % (ref 40–73)
NRBC # BLD: 0 K/UL (ref 0–0.01)
NRBC BLD-RTO: 0 PER 100 WBC
PLATELET # BLD AUTO: 192 K/UL (ref 135–420)
PMV BLD AUTO: 12.5 FL (ref 9.2–11.8)
POTASSIUM SERPL-SCNC: 3.3 MMOL/L (ref 3.5–5.5)
PROT SERPL-MCNC: 6.8 G/DL (ref 6.4–8.2)
RBC # BLD AUTO: 3.7 M/UL (ref 4.2–5.3)
SODIUM SERPL-SCNC: 140 MMOL/L (ref 136–145)
TROPONIN I SERPL HS-MCNC: 7 NG/L (ref 0–54)
WBC # BLD AUTO: 9.3 K/UL (ref 4.6–13.2)

## 2025-04-17 PROCEDURE — 93005 ELECTROCARDIOGRAM TRACING: CPT | Performed by: PHYSICIAN ASSISTANT

## 2025-04-17 PROCEDURE — 84484 ASSAY OF TROPONIN QUANT: CPT

## 2025-04-17 PROCEDURE — 85025 COMPLETE CBC W/AUTO DIFF WBC: CPT

## 2025-04-17 PROCEDURE — 71045 X-RAY EXAM CHEST 1 VIEW: CPT

## 2025-04-17 PROCEDURE — 80053 COMPREHEN METABOLIC PANEL: CPT

## 2025-04-17 PROCEDURE — 99285 EMERGENCY DEPT VISIT HI MDM: CPT

## 2025-04-17 PROCEDURE — 6370000000 HC RX 637 (ALT 250 FOR IP): Performed by: PHYSICIAN ASSISTANT

## 2025-04-17 RX ADMIN — POTASSIUM BICARBONATE 40 MEQ: 782 TABLET, EFFERVESCENT ORAL at 19:19

## 2025-04-17 NOTE — ED NOTES
Pt cleared for d/c by SU Flaherty. Questions encouraged and answered. Patient given discharge papers. Patient understanding of discharge. Patient ambulatory out of ED

## 2025-04-17 NOTE — PROGRESS NOTES
Discussed with Teresita  Patient came to cardiology clinic with nonspecific chronic mixed symptoms.  Symptoms are chronic happening more than 6 months with variable intensity.  No evidence of ACS/CHF/unstable arrhythmia.  Initial testing is pretty benign without any significant abnormality, potassium is 3.3 ER will supplement.  Patient was supposed to have cardiology follow-up 6 months ago with echocardiogram and stress test and patient did not underwent stress test and echocardiogram.  Patient continues to have cardiology follow-up.  Please call if any question or concern  Thanks

## 2025-04-17 NOTE — ED PROVIDER NOTES
[facility] EMERGENCY DEPARTMENT  EMERGENCY DEPARTMENT ENCOUNTER       Pt Name: Aliyah Hebert  MRN: [id number]  Birthdate 1960  Date of evaluation: 4/17/2025  PCP: Adrienne Tellez DO  Note Started: 3:12 PM 4/17/25     CHIEF COMPLAINT       Chief Complaint   Patient presents with    Tingling        HISTORY OF PRESENT ILLNESS: 1 or more elements      History From: Patient  HPI Limitations: None  Chronic Conditions: Hypertension, GERD, DM, neuropathy, paroxysmal A-fib, anxiety, acid reflux  Social Determinants affecting Dx or Tx: none      Aliyah Hebert is a 65 y.o. adult who presents to ED c/o bilateral hand and feet tingling pain and feeling cold for at least the past 3 months.  It is not alleviated or exacerbated by anything.  She denies injury or trauma.  She states she has had neuropathy for many years which was more recently controlled with fibromyalgia.  She also states that she has chest tightness and sometimes shortness of breath with exertion for the past year.  She has a history of paroxysmal A-fib, compliant on Eliquis.  Denies palpitations or heart racing.  She called her cardiologist and spoke to him 2 days ago and was recommended she go to the ER however she decided to come today.  She is awaiting outpatient stress test for preop clearance for hip surgery.  She has no chest pain shortness of breath now.  She also adds that she has had a rash around her mouth that looks and feels like dry skin but does not itch or hurt for the last 2 months.  Has tried 2 rounds of oral and topical steroids without any change in her symptoms.  No history of similar rash.  Denies any other symptoms or complaints.     Nursing Notes were all reviewed and agreed with or any disagreements were addressed in the HPI.    PAST HISTORY     Past Medical History:  Past Medical History:   Diagnosis Date    Acid reflux     Anxiety and depression     Back pain     Chest pain 2022    Fibromyalgia     GERD (gastroesophageal
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
## 2025-04-18 LAB
EKG ATRIAL RATE: 66 BPM
EKG DIAGNOSIS: NORMAL
EKG P AXIS: 52 DEGREES
EKG P-R INTERVAL: 142 MS
EKG Q-T INTERVAL: 416 MS
EKG QRS DURATION: 86 MS
EKG QTC CALCULATION (BAZETT): 436 MS
EKG R AXIS: 1 DEGREES
EKG T AXIS: 42 DEGREES
EKG VENTRICULAR RATE: 66 BPM

## 2025-06-24 ENCOUNTER — HOSPITAL ENCOUNTER (EMERGENCY)
Facility: HOSPITAL | Age: 65
Discharge: HOME | End: 2025-06-24
Attending: EMERGENCY MEDICINE
Payer: MEDICARE

## 2025-06-24 VITALS
DIASTOLIC BLOOD PRESSURE: 79 MMHG | OXYGEN SATURATION: 96 % | SYSTOLIC BLOOD PRESSURE: 116 MMHG | WEIGHT: 195 LBS | RESPIRATION RATE: 16 BRPM | HEART RATE: 76 BPM | BODY MASS INDEX: 33.29 KG/M2 | TEMPERATURE: 97.2 F | HEIGHT: 64 IN

## 2025-06-24 DIAGNOSIS — R55 VASOVAGAL EPISODE: Primary | ICD-10-CM

## 2025-06-24 LAB
ANION GAP SERPL CALC-SCNC: 12 MMOL/L (ref 3–18)
BASOPHILS # BLD: 0.03 K/UL (ref 0–0.1)
BASOPHILS NFR BLD: 0.4 % (ref 0–2)
BUN SERPL-MCNC: 15 MG/DL (ref 6–23)
BUN/CREAT SERPL: 20 (ref 12–20)
CALCIUM SERPL-MCNC: 9.9 MG/DL (ref 8.5–10.1)
CHLORIDE SERPL-SCNC: 104 MMOL/L (ref 98–107)
CO2 SERPL-SCNC: 27 MMOL/L (ref 21–32)
CREAT SERPL-MCNC: 0.72 MG/DL (ref 0.6–1.3)
DIFFERENTIAL METHOD BLD: ABNORMAL
EOSINOPHIL # BLD: 0.29 K/UL (ref 0–0.4)
EOSINOPHIL NFR BLD: 4 % (ref 0–5)
ERYTHROCYTE [DISTWIDTH] IN BLOOD BY AUTOMATED COUNT: 13.3 % (ref 11.6–14.5)
GLUCOSE SERPL-MCNC: 100 MG/DL (ref 74–108)
HCT VFR BLD AUTO: 38.3 % (ref 35–45)
HGB BLD-MCNC: 13 G/DL (ref 12–16)
IMM GRANULOCYTES # BLD AUTO: 0.04 K/UL (ref 0–0.04)
IMM GRANULOCYTES NFR BLD AUTO: 0.6 % (ref 0–0.5)
LYMPHOCYTES # BLD: 2.12 K/UL (ref 0.9–3.3)
LYMPHOCYTES NFR BLD: 29.4 % (ref 21–52)
MCH RBC QN AUTO: 32.5 PG (ref 24–34)
MCHC RBC AUTO-ENTMCNC: 33.9 G/DL (ref 31–37)
MCV RBC AUTO: 95.8 FL (ref 78–100)
MONOCYTES # BLD: 0.38 K/UL (ref 0.05–1.2)
MONOCYTES NFR BLD: 5.3 % (ref 3–10)
NEUTS SEG # BLD: 4.35 K/UL (ref 1.8–8)
NEUTS SEG NFR BLD: 60.3 % (ref 40–73)
NRBC # BLD: 0 K/UL (ref 0–0.01)
NRBC BLD-RTO: 0 PER 100 WBC
PLATELET # BLD AUTO: 177 K/UL (ref 135–420)
PMV BLD AUTO: 12.8 FL (ref 9.2–11.8)
POTASSIUM SERPL-SCNC: 4.1 MMOL/L (ref 3.5–5.5)
RBC # BLD AUTO: 4 M/UL (ref 4.2–5.3)
SODIUM SERPL-SCNC: 142 MMOL/L (ref 136–145)
TROPONIN T SERPL HS-MCNC: 8.4 NG/L (ref 0–14)
WBC # BLD AUTO: 7.2 K/UL (ref 4.6–13.2)

## 2025-06-24 PROCEDURE — 99284 EMERGENCY DEPT VISIT MOD MDM: CPT

## 2025-06-24 PROCEDURE — 80048 BASIC METABOLIC PNL TOTAL CA: CPT

## 2025-06-24 PROCEDURE — 93005 ELECTROCARDIOGRAM TRACING: CPT | Performed by: EMERGENCY MEDICINE

## 2025-06-24 PROCEDURE — 85025 COMPLETE CBC W/AUTO DIFF WBC: CPT

## 2025-06-24 PROCEDURE — 84484 ASSAY OF TROPONIN QUANT: CPT

## 2025-06-24 ASSESSMENT — PAIN - FUNCTIONAL ASSESSMENT: PAIN_FUNCTIONAL_ASSESSMENT: NONE - DENIES PAIN

## 2025-06-24 NOTE — ED TRIAGE NOTES
Patient reports she was getting outpatient stress test and gets diaphoretic and dizzy and almost passes out with IV placement and she got like this when they placed her IV. They subsequently took it out

## 2025-06-27 LAB
EKG ATRIAL RATE: 66 BPM
EKG DIAGNOSIS: NORMAL
EKG P AXIS: 51 DEGREES
EKG P-R INTERVAL: 162 MS
EKG Q-T INTERVAL: 440 MS
EKG QRS DURATION: 84 MS
EKG QTC CALCULATION (BAZETT): 461 MS
EKG R AXIS: 36 DEGREES
EKG T AXIS: 63 DEGREES
EKG VENTRICULAR RATE: 66 BPM

## 2025-06-27 PROCEDURE — 93010 ELECTROCARDIOGRAM REPORT: CPT | Performed by: INTERNAL MEDICINE

## 2025-06-28 NOTE — ED PROVIDER NOTES
have encouraged them to ask questions as they arise throughout their visit.    Patient was given the following medications:  Medications - No data to display    ED Course as of 06/28/25 1053   Tue Jun 24, 2025   1446 EKG interpretation: (Preliminary)  Rhythm: normal sinus rhythm; and regular . Rate (approx.): 66; Axis: normal; P wave: normal; QRS interval: normal ; ST/T wave: normal;  was interpreted by Maynor Hand MD,ED Provider.   [BN]      ED Course User Index  [BN] Maynor Hand MD         PROGRESS  Aliyah Hebert's  results have been reviewed with her.  She has been counseled regarding her diagnosis.  She verbally conveys understanding and agreement of the signs, symptoms, diagnosis, treatment and prognosis and additionally agrees to follow up as recommended with Adrienne Strauss DO in 24 - 48 hours.  She also agrees with the care-plan and conveys that all of her questions have been answered.  I have also put together some discharge instructions for her that include: 1) educational information regarding their diagnosis, 2) how to care for their diagnosis at home, as well a 3) list of reasons why they would want to return to the ED prior to their follow-up appointment, should their condition change.      Disposition:  home    PLAN:  1. DISCHARGE MEDICATIONS:  Discharge Medication List as of 6/24/2025  3:36 PM             Details   citalopram (CELEXA) 40 MG tablet Take 1 tablet by mouth dailyHistorical Med      losartan (COZAAR) 25 MG tablet Take 1 tablet by mouth at bedtimeHistorical Med      meloxicam (MOBIC) 15 MG tablet Take 1 tablet by mouth daily as needed for Pain, Disp-30 tablet, R-0Normal      apixaban (ELIQUIS) 5 MG TABS tablet Take 1 tablet by mouth 2 times dailyHistorical Med      buPROPion (WELLBUTRIN XL) 150 MG extended release tablet Take 150 mg by mouth dailyHistorical Med      gabapentin (NEURONTIN) 300 MG capsule Take 1 capsule by mouth nightly.Historical Med

## (undated) DEVICE — PACK PROCEDURE SURG ANTR HIP

## (undated) DEVICE — HANDPIECE SET WITH HIGH FLOW TIP AND SUCTION TUBE: Brand: INTERPULSE

## (undated) DEVICE — SHEET,DRAPE,40X58,STERILE: Brand: MEDLINE

## (undated) DEVICE — SHEET,DRAPE,70X100,STERILE: Brand: MEDLINE

## (undated) DEVICE — IMPERVIOUS SURGICAL GOWN PACK, XXL POLYREINFORCED: Brand: CONVERTORS

## (undated) DEVICE — SOL IRR STRL H2O 1500ML BTL --

## (undated) DEVICE — 3M™ IOBAN™ 2 ANTIMICROBIAL INCISE DRAPE 6650EZ: Brand: IOBAN™ 2

## (undated) DEVICE — GOWN,SIRUS,NONRNF,SETINSLV,2XL,18/CS: Brand: MEDLINE

## (undated) DEVICE — WEREWOLF FASTSEAL 6.0 HEMOSTASIS WAND: Brand: FASTSEAL 6.0 HEMOSTASIS WAND

## (undated) DEVICE — NDL PRT INJ NSAF BLNT 18GX1.5 --

## (undated) DEVICE — HOOD, PEEL-AWAY: Brand: FLYTE

## (undated) DEVICE — SOLUTION IV 250ML 0.9% SOD CHL CLR INJ FLX BG CONT PRT CLSR

## (undated) DEVICE — SUTURE MCRYL SZ 2-0 L36IN ABSRB UD L36MM CT-1 1/2 CIR Y945H

## (undated) DEVICE — COAXIAL FEMORAL CANAL TIP

## (undated) DEVICE — SOL IRRIGATION INJ NACL 0.9% 500ML BTL

## (undated) DEVICE — OSCILLATING TIP SAW CARTRIDGE: Brand: PRECISION FALCON

## (undated) DEVICE — SUT VCRL + 1 36IN CT1 VIO --

## (undated) DEVICE — SYR 10ML LUER LOK 1/5ML GRAD --

## (undated) DEVICE — OPTIFOAM GENTLE SA, POSTOP, 4X8: Brand: MEDLINE

## (undated) DEVICE — GARMENT,MEDLINE,DVT,INT,CALF,MED, GEN2: Brand: MEDLINE

## (undated) DEVICE — INTENDED FOR TISSUE SEPARATION, AND OTHER PROCEDURES THAT REQUIRE A SHARP SURGICAL BLADE TO PUNCTURE OR CUT.: Brand: BARD-PARKER ® CARBON RIB-BACK BLADES

## (undated) DEVICE — SUT MCRYL + 3-0 27IN PS1 UD --

## (undated) DEVICE — NDL SPNE QNCKE 18GX3.5IN LF --

## (undated) DEVICE — SYSTEM SKIN CLSR 22CM DERMBND PRINEO

## (undated) DEVICE — SOLUTION IRRIG 3000ML LAC R FLX CONT

## (undated) DEVICE — SYR LR LCK 1ML GRAD NSAF 30ML --